# Patient Record
Sex: FEMALE | Race: WHITE | Employment: OTHER | URBAN - METROPOLITAN AREA
[De-identification: names, ages, dates, MRNs, and addresses within clinical notes are randomized per-mention and may not be internally consistent; named-entity substitution may affect disease eponyms.]

---

## 2018-02-22 ENCOUNTER — OFFICE VISIT (OUTPATIENT)
Dept: INTERNAL MEDICINE CLINIC | Facility: CLINIC | Age: 83
End: 2018-02-22

## 2018-02-22 VITALS
HEART RATE: 74 BPM | TEMPERATURE: 98.2 F | BODY MASS INDEX: 28.12 KG/M2 | HEIGHT: 62 IN | SYSTOLIC BLOOD PRESSURE: 107 MMHG | OXYGEN SATURATION: 93 % | RESPIRATION RATE: 16 BRPM | WEIGHT: 152.8 LBS | DIASTOLIC BLOOD PRESSURE: 43 MMHG

## 2018-02-22 DIAGNOSIS — C25.9 MALIGNANT NEOPLASM OF PANCREAS, UNSPECIFIED LOCATION OF MALIGNANCY (HCC): Primary | ICD-10-CM

## 2018-02-22 RX ORDER — MORPHINE SULFATE 15 MG/1
TABLET, FILM COATED, EXTENDED RELEASE ORAL EVERY 8 HOURS
COMMUNITY
End: 2018-02-28 | Stop reason: SDUPTHER

## 2018-02-22 RX ORDER — AMOXICILLIN 250 MG
1 CAPSULE ORAL DAILY
COMMUNITY
End: 2018-04-11

## 2018-02-22 NOTE — PROGRESS NOTES
HISTORY OF PRESENT ILLNESS  Bhavesh Cabrales is a 80 y.o. female. Chief Complaint   Patient presents with   174 [facility] Patient     Establish Care. Medication  evaluation. Room 8       HPI   Accompanied by second cousin  Ijeoma Fee phone: [phone]) who gives history secondary to patients' poor hearing. Pt previously lived in [place]. Was dx with stage 4 pancreatic cancer in hospital in [place]. Salena Amaro then moved pt in with her in [place] s/p diagnosis. Does not have local oncologist/palliative medicine. Had Hospice through [facility] and Salena Amaro notes that she has a robust local support group of friends who are helping as well. When she needs extra help, Salena Ramirezon has contracted caregivers during the day. Salena Ramirezon notes that Hospice is discharging pt from their care because her condition is not declining. Pt has only enough pain medication to take through Tuesday of next week, and Salena Amaro would like us to help her re-establish hospice care. Salena Amaro notes that after several unsuccessful medication trials, Chani is doing well on MS Contin - she is now able to eat solid food, walking with cane at home and wheelchair while out, giving herself sponge baths, brushing own teeth. Salena Amaro has been helping to bathe Chani as well. Salena Amaro notes that Chani is doing well on a routine schedule at home. Salena Amaro notes that she makes sure Chani leaves the house with her at least twice/week for groceries/art museum trips. They recently acquired a Papillon dog named Avis - pt smiles at this and notes that she is is enjoying having this dog, and the dog sits nicely with her for comfort. Review of Systems   Constitutional: Negative for fever. Respiratory: Negative for shortness of breath. Gastrointestinal: Negative for abdominal pain. Musculoskeletal: Negative for falls. Psychiatric/Behavioral: Negative for depression. The patient is not nervous/anxious.         Physical Exam   Constitutional: She is oriented to person, place, and time. She appears well-developed and well-nourished. No distress. Pt is well groomed and appears alert, happy, and appropriately engaged at times in the conversation, but her ability to participate in the conversation is limited by poor hearing. HENT:   Head: Normocephalic and atraumatic. Neck: Neck supple. No JVD present. Cardiovascular: Normal rate, regular rhythm and normal heart sounds. Pulmonary/Chest: Effort normal and breath sounds normal. No respiratory distress. Musculoskeletal: She exhibits no edema. Neurological: She is alert and oriented to person, place, and time. Skin: Skin is warm and dry. Psychiatric: She has a normal mood and affect. Her behavior is normal. Judgment and thought content normal.   Nursing note and vitals reviewed. ASSESSMENT and PLAN    ICD-10-CM ICD-9-CM    1. Malignant neoplasm of pancreas, unspecified location of malignancy (HCC) C25.9 157.9 Continue morphine CR (MS CONTIN) 15 mg CR tablet. No new Rx written. Emphasized that pain medications must be managed through palliative medicine.        Continue senna-docusate (SENNA PLUS) 8.6-50 mg per tablet      Continue DULCOLAX, BISACODYL, PO      REFERRAL TO PALLIATIVE MEDICINE      REFERRAL TO HOME HEALTH      REFERRAL TO HOSPICE

## 2018-02-22 NOTE — ACP (ADVANCE CARE PLANNING)
ACP is at home and Melisa Ontiveros (caregiver and second cousin) notes that she will bring it to pt's next appointment.

## 2018-02-22 NOTE — PROGRESS NOTES
Chief Complaint   Patient presents with   174 [facility] Patient     Establish Care. Medication  evaluation. Room 8      Dx: with Pancreatic cancer on 7/11/2017, maybe previously per benji    1. Have you been to the ER, urgent care clinic since your last visit? Hospitalized since your last visit? No    2. Have you seen or consulted any other health care providers outside of the [address] [place] [address] since your last visit? Include any pap smears or colon screening.  No

## 2018-02-22 NOTE — MR AVS SNAPSHOT
[address] [facility] 
[phone] Patient: James Jerry MRN: [id number] :[id number] Visit Information Date & Time Provider Department Dept. Phone Encounter #  
 2018 12:45 PM Олег Licona PA-C [facility] Internal Medicine [phone] [id number] Upcoming Health Maintenance Date Due DTaP/Tdap/Td series (1 - Tdap) 1944 ZOSTER VACCINE AGE 60> 1983 GLAUCOMA SCREENING Q2Y 1988 OSTEOPOROSIS SCREENING (DEXA) 1988 Pneumococcal 65+ Low/Medium Risk (1 of 2 - PCV13) 1988 MEDICARE YEARLY EXAM 1988 Influenza Age 5 to Adult 2017 Allergies as of 2018  Review Complete On: 2018 By: Sona Mckeon LPN No Known Allergies Current Immunizations  Never Reviewed No immunizations on file. Not reviewed this visit You Were Diagnosed With   
  
 Codes Comments Malignant neoplasm of pancreas, unspecified location of malignancy ([facility]    -  Primary ICD-10-CM: C25.9 ICD-9-CM: 157.9 Vitals BP Pulse Temp Resp Height(growth percentile) Weight(growth percentile) 107/43 (BP 1 Location: Left arm, BP Patient Position: Sitting) 74 98.2 °F (36.8 °C) (Oral) 16 5' 2\" (1.575 m) 152 lb 12.8 oz (69.3 kg) SpO2 BMI OB Status Smoking Status 93% 27.95 kg/m2 Postmenopausal Never Smoker Vitals History BMI and BSA Data Body Mass Index Body Surface Area  
 27.95 kg/m 2 1.74 m 2 Your Updated Medication List  
  
   
This list is accurate as of 18  1:51 PM.  Always use your most recent med list.  
  
  
  
  
 DULCOLAX (BISACODYL) PO Take  by mouth. MS CONTIN 15 mg CR tablet Generic drug:  morphine CR Take  by mouth every twelve (12) hours. SENNA PLUS 8.6-50 mg per tablet Generic drug:  senna-docusate Take 1 Tab by mouth daily. We Performed the Following [address] REFERRAL TO HOSPICE [REF35 Custom] Comments:  
 LESLY MILLARD [facility] [phone] Fax: [phone] 
 
or At [facility] [address] [facility] [address] [place], [address] 
 [phone] 
 [phone] REFERRAL TO PALLIATIVE MEDICINE [NZJ246 Custom] Referral Information Referral ID Referred By Referred To  
  
 [id number] [place], [facility] Cristiane Messina MD   
   [address] [address] PALLIATIVE MEDICINE [place], [facility] Phone: [phone] Fax: [phone] Visits Status Start Date End Date 1 New Request 2/22/18 2/22/19 If your referral has a status of pending review or denied, additional information will be sent to support the outcome of this decision. Referral ID Referred By Referred To  
 [id number] [place] [address]  
   Selin Cruz, [address] Phone: [phone] Fax: [phone] Visits Status Start Date End Date 1 New Request 2/22/18 2/22/19 If your referral has a status of pending review or denied, additional information will be sent to support the outcome of this decision. Referral ID Referred By Referred To  
 [id number] Zach Staley E Not Available Visits Status Start Date End Date 1 New Request 2/22/18 2/22/19 If your referral has a status of pending review or denied, additional information will be sent to support the outcome of this decision. Introducing [facility] & HEALTH SERVICES! Alethea Dubois introduces [employer] patient portal. Now you can access parts of your medical record, email your doctor's office, and request medication refills online. 1. In your internet browser, go to [email or web address] - [employer] 2. Click on the First Time User? Click Here link in the Sign In box. You will see the New Member Sign Up page. 3. Enter your [employer] Access Code exactly as it appears below. You will not need to use this code after youve completed the sign-up process. If you do not sign up before the expiration date, you must request a new code. · [employer] Access Code: [email or web address] Expires: 5/23/2018 12:58 PM 
 
4. Enter the last four digits of your Social Security Number (xxxx) and Date of Birth (mm/dd/yyyy) as indicated and click Submit. You will be taken to the next sign-up page. 5. Create a TekLinks ID. This will be your TekLinks login ID and cannot be changed, so think of one that is secure and easy to remember. 6. Create a [employer] password. You can change your password at any time. 7. Enter your Password Reset Question and Answer. This can be used at a later time if you forget your password. 8. Enter your e-mail address. You will receive e-mail notification when new information is available in [address] 9. Click Sign Up. You can now view and download portions of your medical record. 10. Click the Download Summary menu link to download a portable copy of your medical information. If you have questions, please visit the Frequently Asked Questions section of the [employer] website. Remember, [employer] is NOT to be used for urgent needs. For medical emergencies, dial 911. Now available from your iPhone and Android! Please provide this summary of care documentation to your next provider. If you have any questions after today's visit, please call [phone].

## 2018-02-23 ENCOUNTER — HOSPICE ADMISSION (OUTPATIENT)
Dept: HOSPICE | Facility: HOSPICE | Age: 83
End: 2018-02-23

## 2018-02-26 ENCOUNTER — NURSE NAVIGATOR (OUTPATIENT)
Dept: PALLATIVE CARE | Age: 83
End: 2018-02-26

## 2018-02-26 ENCOUNTER — TELEPHONE (OUTPATIENT)
Dept: PALLATIVE CARE | Age: 83
End: 2018-02-26

## 2018-02-26 NOTE — TELEPHONE ENCOUNTER
JUMA Chaudhary is calling regarding referral.  States she needs a MD to prescribe patients medications. MS. Nick Hasamara referred patient to Palliative.

## 2018-02-26 NOTE — PROGRESS NOTES
[employer] Palliative Medicine Office  Nursing Note  [phone]  Fax [phone]      Name:  Janessa Varela  YOB: 1923    Received outpatient Palliative Medicine referral from Joy Virgen, [facility] [address]) to see pt for symptom management. Pt has stage IV pancreatic cancer. Pt was seen by Joy Virgen to establish care on 2/22/18. Per Joy Virgen' note, pt was diagnosed with cancer in [place]. Pt moved to [facility] [place] to live with her second cousin Angela Bacon ([phone]). Pt does not have an oncologist in [place]. Pt has been in [facility] and they are discharging pt because her condition has not declined. Gonzalez Cihlds has a local support group of friends that are helping. Gonzalez Childs also has hired caregivers for Ms. Chin during the day. Gonzalez Childs notes that after several unsuccessful medication trials, Crhistina Mcpherson is doing well on MS Contin - she is now able to eat solid food, walking with cane at home and wheelchair while out, giving herself sponge baths, brushing own teeth. Gonzalez Childs notes that she makes sure Christina Mcpherson leaves the house with her at least twice/week for groceries/art museum trips.       Angela Bacon called our office today to speak with nurse about the referral.  Nurse explained outpatient Palliative Medicine services. Gonzalez Childs is interested in Palliative following Ms. Daina Sidhu until her condition declines and she can re-enroll in hospice. Appt scheduled for 8:30 am on 2/28/18 with Dr. Ronda Florentino at [facility] location. Gonzalez Childs says she has requested records from [facility] and they should arrive to her today.   She will bring the records with her on 2/28/18.  SANDIE Gale, RN-BC  Clinical Referral Navigator  [phone]

## 2018-02-28 ENCOUNTER — DOCUMENTATION ONLY (OUTPATIENT)
Dept: PALLATIVE CARE | Age: 83
End: 2018-02-28

## 2018-02-28 ENCOUNTER — OFFICE VISIT (OUTPATIENT)
Dept: PALLATIVE CARE | Age: 83
End: 2018-02-28

## 2018-02-28 ENCOUNTER — TELEPHONE (OUTPATIENT)
Dept: PALLATIVE CARE | Age: 83
End: 2018-02-28

## 2018-02-28 VITALS
RESPIRATION RATE: 16 BRPM | DIASTOLIC BLOOD PRESSURE: 55 MMHG | SYSTOLIC BLOOD PRESSURE: 148 MMHG | OXYGEN SATURATION: 92 % | WEIGHT: 154.2 LBS | BODY MASS INDEX: 28.37 KG/M2 | HEART RATE: 72 BPM | HEIGHT: 62 IN | TEMPERATURE: 97.3 F

## 2018-02-28 DIAGNOSIS — R10.84 ABDOMINAL PAIN, GENERALIZED: Primary | ICD-10-CM

## 2018-02-28 DIAGNOSIS — R53.83 FATIGUE, UNSPECIFIED TYPE: ICD-10-CM

## 2018-02-28 DIAGNOSIS — K86.89 PANCREATIC MASS: ICD-10-CM

## 2018-02-28 DIAGNOSIS — K59.03 DRUG-INDUCED CONSTIPATION: ICD-10-CM

## 2018-02-28 RX ORDER — ASPIRIN 81 MG/1
TABLET ORAL
COMMUNITY
End: 2020-01-01

## 2018-02-28 RX ORDER — AMOXICILLIN 250 MG
2 CAPSULE ORAL 2 TIMES DAILY
COMMUNITY
End: 2020-01-01

## 2018-02-28 RX ORDER — MORPHINE SULFATE 15 MG/1
15 TABLET, FILM COATED, EXTENDED RELEASE ORAL EVERY 8 HOURS
Qty: 90 TAB | Refills: 0 | Status: SHIPPED | OUTPATIENT
Start: 2018-02-28 | End: 2018-04-06 | Stop reason: SDUPTHER

## 2018-02-28 NOTE — MR AVS SNAPSHOT
2700 [facility] 1200 [facility] 1400 [address] [place] [address] 
[phone] Patient: Donald Ricketts MRN: [id number] :[id number] Visit Information Date & Time Provider Department Dept. Phone Encounter #  
 2018  8:30 AM Neville Rodriguez MD [address] [id number] Upcoming Health Maintenance Date Due DTaP/Tdap/Td series (1 - Tdap) 1944 ZOSTER VACCINE AGE 60> 1983 GLAUCOMA SCREENING Q2Y 1988 OSTEOPOROSIS SCREENING (DEXA) 1988 Pneumococcal 65+ High/Highest Risk (1 of 2 - PCV13) 1988 MEDICARE YEARLY EXAM 1988 Influenza Age 5 to Adult 2017 Allergies as of 2018  Review Complete On: 2018 By: Neville Rodriguez MD  
 No Known Allergies Current Immunizations  Never Reviewed No immunizations on file. Not reviewed this visit You Were Diagnosed With   
  
 Codes Comments Abdominal pain, generalized    -  Primary ICD-10-CM: R10.84 ICD-9-CM: 789.07 Malignant neoplasm of pancreas, unspecified location of malignancy ([facility]     ICD-10-CM: C25.9 ICD-9-CM: 157.9 Vitals BP Pulse Temp Resp Height(growth percentile) Weight(growth percentile) 148/55 (BP 1 Location: Right arm, BP Patient Position: Sitting) 72 97.3 °F (36.3 °C) (Oral) 16 5' 2\" (1.575 m) 154 lb 3.2 oz (69.9 kg) SpO2 BMI OB Status Smoking Status 92% 28.2 kg/m2 Postmenopausal Never Smoker BMI and BSA Data Body Mass Index Body Surface Area  
 28.2 kg/m 2 1.75 m 2 Your Updated Medication List  
  
   
This list is accurate as of 18  9:54 AM.  Always use your most recent med list.  
  
  
  
  
 aspirin delayed-release 81 mg tablet Take  by mouth daily as needed for Pain. DULCOLAX (BISACODYL) PO Take 0.5 Caps by mouth daily as needed. morphine CR 15 mg CR tablet Commonly known as:  MS CONTIN  
 Take 1 Tab by mouth every eight (8) hours. Max Daily Amount: 45 mg.  
  
 * SENNA PLUS 8.6-50 mg per tablet Generic drug:  senna-docusate Take 1 Tab by mouth daily. * SENNA PLUS 8.6-50 mg per tablet Generic drug:  senna-docusate Take 2 Tabs by mouth two (2) times a day. * Notice: This list has 2 medication(s) that are the same as other medications prescribed for you. Read the directions carefully, and ask your doctor or other care provider to review them with you. Prescriptions Printed Refills  
 morphine CR (MS CONTIN) 15 mg CR tablet 0 Sig: Take 1 Tab by mouth every eight (8) hours. Max Daily Amount: 45 mg.  
 Class: Print Route: Oral  
  
Patient Instructions Dear Galina Roberts , It was a pleasure seeing you in the Palliative Medicine Office today. This is what we talked about:  
 
1. Your pain 
-You had severe abdominal pain last summer when you were in the hospital 
-You haven't had any pain since you started taking the pain medicine on a regular basis 
-Continue morphine long-acting 15-mg every 8 hours 
-You are not using any short-acting opioid pain medicines at this time 
-Gato Felix very occasionally gives you a baby aspirin when you have other types of pain like a headache 2. Your bowels 
-You've had 2 episodes of severe constipation which can occur with morphine 
-You've been having regular bowel movements since you started taking 2 senna plus tabs twice a day 
-You occasionally take dulcolax if you go more than 2 days without a bowel movement 3. Your fatigue 
-You have mild fatigue which we did not talk about in depth today 4.  Your function 
-You walk with a cane or a walker to help prevent falls 
-You have a wheelchair you can use when you go outside of your home 
-You give yourself sponge baths every day with your aide or Gato Felix close by in case you need any help 
-You are able to get to the bathroom when you need to go and you don't have any accidents 
-Merary Alberto helps to organize your medications for you 
-You have someone with you in your home all the time just in case you need help 5. What's important to you 
-You really enjoy reading, especially mysteries! 
-You and Merary Alberto go out once a week for a social activity such as going to [email or web address] on Thursday nights for jaZyncd night 
-You go out to get your hair done 
-You enjoy the company of your Mariela Fisherman, you beloved canine  
-Regino Mack been to [place] with Merary Alberto and may go on other trips if you are up to it 
-I'm going to get additional records from the hospital in [place] which we can talk about at your next visit This is what you have shared with us about Advance Care Planning Advance Care Planning 2/28/2018 Patient's Healthcare Decision Maker is: Verbal statement (Legal Next of Kin remains as decision maker) Primary Decision Maker Name Ban Gonsalez Primary Decision Maker Phone Number [phone] Primary Decision Maker Relationship to Patient Friend The Palliative Medicine Team is here to support you and your family. We will see you again in 4 weeks. Our Nurse Navigator Germán Cheng will check in with you by phone before that time. If there are any concerns before that time, such as medication questions, worsening symptoms or a need to see a physician for an urgent or emergent situation; please call [phone] (COPE). A physician is also on call after our normal business hours of 8am to 5pm.  
 
In order to serve you better, please allow up to 48 hours for prescription refills to be processed. Certain medications may require more paperwork or a written prescription that you may need to  from the office. We appreciate you letting us know of any refill requests as soon as possible. We also would like you to sign up for [employer] as well. Sincerely, Aaliyah Potter MD and the Palliative Medicine Team 
 
 
  
 Introducing [facility] & HEALTH SERVICES! [facility] introduces [employer] patient portal. Now you can access parts of your medical record, email your doctor's office, and request medication refills online. 1. In your internet browser, go to [email or web address] 2. Click on the First Time User? Click Here link in the Sign In box. You will see the New Member Sign Up page. 3. Enter your [employer] Access Code exactly as it appears below. You will not need to use this code after youve completed the sign-up process. If you do not sign up before the expiration date, you must request a new code. · [employer] Access Code: [email or web address] Expires: 5/23/2018 12:58 PM 
 
4. Enter the last four digits of your Social Security Number (xxxx) and Date of Birth (mm/dd/yyyy) as indicated and click Submit. You will be taken to the next sign-up page. 5. Create a [employer] ID. This will be your [employer] login ID and cannot be changed, so think of one that is secure and easy to remember. 6. Create a [employer] password. You can change your password at any time. 7. Enter your Password Reset Question and Answer. This can be used at a later time if you forget your password. 8. Enter your e-mail address. You will receive e-mail notification when new information is available in [address] 9. Click Sign Up. You can now view and download portions of your medical record. 10. Click the Download Summary menu link to download a portable copy of your medical information. If you have questions, please visit the Frequently Asked Questions section of the [employer] website. Remember, [employer] is NOT to be used for urgent needs. For medical emergencies, dial 911. Now available from your iPhone and Android! Please provide this summary of care documentation to your next provider. Your primary care clinician is listed as Kody Paige. If you have any questions after today's visit, please call [phone].

## 2018-02-28 NOTE — TELEPHONE ENCOUNTER
Calling [address] [place] [address] to obtain discharge papers for insurance reasons. Spoke to , transferred me to local office , spoke to [place] who then transferred me to [facility]. Mac states that patient was discharged from Hospice yesterday and she will fax me the paperwork to [phone].

## 2018-02-28 NOTE — PATIENT INSTRUCTIONS
Dear Suzi Whitley ,    It was a pleasure seeing you in the Palliative Medicine Office today. This is what we talked about:     1. Your pain  -You had severe abdominal pain last summer when you were in the hospital  -You haven't had any pain since you started taking the pain medicine on a regular basis  -Continue morphine long-acting 15-mg every 8 hours  -You are not using any short-acting opioid pain medicines at this time  -Merary Alberto very occasionally gives you a baby aspirin when you have other types of pain like a headache    2. Your bowels  -You've had 2 episodes of severe constipation which can occur with morphine  -You've been having regular bowel movements since you started taking 2 senna plus tabs twice a day  -You occasionally take dulcolax if you go more than 2 days without a bowel movement    3. Your fatigue  -You have mild fatigue which we did not talk about in depth today    4. Your function  -You walk with a cane or a walker to help prevent falls  -You have a wheelchair you can use when you go outside of your home  -You give yourself sponge baths every day with your aide or Merary Alberto close by in case you need any help  -You are able to get to the bathroom when you need to go and you don't have any accidents  -Merary Alberto helps to organize your medications for you  -You have someone with you in your home all the time just in case you need help    5.  What's important to you  -You really enjoy reading, especially mysteries!  -You and Merary Alberto go out once a week for a social activity such as going to [employer] on Thursday nights for jazz night  -You go out to get your hair done  -You enjoy the company of your Kiraxman, you beloved canine   -Regino Mack been to [facility] with Merary Alberto and may go on other trips if you are up to it  -I'm going to get additional records from the hospital in [place] which we can talk about at your next visit    This is what you have shared with us about Advance Care Planning  Advance Care Planning 2/28/2018   Patient's Healthcare Decision Maker is: Verbal statement (Legal Next of Kin remains as decision maker)   Primary Decision Maker Name Karen Casanova   Primary Decision Maker Phone Number [phone]   Primary Decision Maker Relationship to Patient Friend         The Palliative Medicine Team is here to support you and your family. We will see you again in 4 weeks. Our Nurse Navigator Josefina Enamorado will check in with you by phone before that time. If there are any concerns before that time, such as medication questions, worsening symptoms or a need to see a physician for an urgent or emergent situation; please call [phone] (COPE). A physician is also on call after our normal business hours of 8am to 5pm.     In order to serve you better, please allow up to 48 hours for prescription refills to be processed. Certain medications may require more paperwork or a written prescription that you may need to  from the office. We appreciate you letting us know of any refill requests as soon as possible. We also would like you to sign up for [employer] as well.     Sincerely,      Pamela Mascorro MD and the Palliative Medicine Team

## 2018-02-28 NOTE — PROGRESS NOTES
Palliative Medicine Outpatient Services  [place]: [phone] ([phone])    Patient Name: Yazmin Carl  YOB: 1923    Date of Current Visit: 02/28/18  Location of Current Visit:    [x] [facility] Office  [] [facility] Office  [] [id number] [facility] Office  [] Home  [] Other:      Date of Initial Visit: 2/28/18   Requesting Physician: Jessica Arora PA-C  Primary Care Physician: Hortensia Davis PA-C      SUMMARY:   Yazmin Carl is a 80y.o. year old with a past history of pancreatic mass by CT suspicious for malignancy, who was referred to Palliative Medicine by Ms. Trisha Gaxiola for symptom management and supportive care. She was hospitalized in [place] in 7/2017 with decreased appetite, functional decline and abdominal pain. CT scan then revealed presence of pancreatic mass (CT scan/results not available at this time for review). She was given a presumptive diagnosis of pancreatic cancer. She declined further work-up. She moved from her home in [place] where she had been living independently to [place] to live with her 2nd cousin/POA, Alexandria Billingsley, and was followed by [facility] until recently when she was discharged from hospice for failure to decline. The patients social history includes: she is . She has no living siblings. She lives in [place] with her second cousin, Alexandria Billingsley. Palliative Medicine is addressing the following current patient/family concerns: abdominal pain in setting of CT findings suspicious for pancreatic malignancy, mild fatigue, constipation. PALLIATIVE DIAGNOSES:       ICD-10-CM ICD-9-CM    1. Abdominal pain, generalized R10.84 789.07 morphine CR (MS CONTIN) 15 mg CR tablet   2. Drug-induced constipation K59.03 564.09      E980.5    3. Fatigue, unspecified type R53.83 780.79    4. Pancreatic mass K86.9 577.9           PLAN:   Patient Instructions     Dear Yazmin Carl ,    It was a pleasure seeing you in the Palliative Medicine Office today. This is what we talked about:     1. Your pain  -You had severe abdominal pain last summer when you were in the hospital  -You haven't had any pain since you started taking the pain medicine on a regular basis  -Continue morphine long-acting 15-mg every 8 hours  -You are not using any short-acting opioid pain medicines at this time  -Micheline Lot very occasionally gives you a baby aspirin when you have other types of pain like a headache    2. Your bowels  -You've had 2 episodes of severe constipation which can occur with morphine  -You've been having regular bowel movements since you started taking 2 senna plus tabs twice a day  -You occasionally take dulcolax if you go more than 2 days without a bowel movement    3. Your fatigue  -You have mild fatigue which we did not talk about in depth today    4. Your function  -You walk with a cane or a walker to help prevent falls  -You have a wheelchair you can use when you go outside of your home  -You give yourself sponge baths every day with your aide or Micheline Lot close by in case you need any help  -You are able to get to the bathroom when you need to go and you don't have any accidents  -Micheline Lot helps to organize your medications for you  -You have someone with you in your home all the time just in case you need help    5.  What's important to you  -You really enjoy reading, especially mysteries!  -You and Micheline Lot go out once a week for a social activity such as going to [employer] on Thursday nights for jazz night  -You go out to get your hair done  -You enjoy the company of your Cathleen Ramirez, you beloved canine   -Snow Young been to the [facility] with Micheline Lot and may go on other trips if you are up to it  -I'm going to get additional records from the hospital in [place] which we can talk about at your next visit    This is what you have shared with us about Mary Sheth. Planning 2/28/2018   Patient's [address] is: Verbal statement (Legal Next of Kin remains as decision maker)   Primary Decision Maker Name Cary Garcia   Primary Decision Maker Phone Number [phone]   Primary Decision Maker Relationship to Patient Friend         The Palliative Medicine Team is here to support you and your family. We will see you again in 4 weeks. Our Nurse Navigator Tristen Reyes will check in with you by phone before that time. If there are any concerns before that time, such as medication questions, worsening symptoms or a need to see a physician for an urgent or emergent situation; please call [phone] (COPE). A physician is also on call after our normal business hours of 8am to 5pm.     In order to serve you better, please allow up to 48 hours for prescription refills to be processed. Certain medications may require more paperwork or a written prescription that you may need to  from the office. We appreciate you letting us know of any refill requests as soon as possible. We also would like you to sign up for [employer] as well.     Sincerely,      Tiffanie Hernández MD and the Palliative Medicine Team      Counseling and Coordination: note routed to Hedy LIAO; will obtain records/CT scan report from        [address] / TREATMENT PREFERENCES:   [====Goals of Care====]  GOALS OF CARE:  Patient / health care proxy stated goals: maintenance of quality of life      TREATMENT PREFERENCES:   Code Status:  [] Attempt Resuscitation       [x] Do Not Attempt Resuscitation    Advance Care Planning:  Advance Care Planning 2/28/2018   Patient's Healthcare Decision Maker is: Verbal statement (Legal Next of Kin remains as decision maker)   Primary Decision Maker Name Cary Garcia   Primary Decision Maker Phone Number [phone]   Primary Decision Maker Relationship to Patient Friend       Other:  (If patient appropriate for POST, consider using PALLPOST smart phrase here)    The palliative care team has discussed with patient / health care proxy about goals of care / treatment preferences for patient.  [====Goals of Care====]         PRESCRIPTIONS GIVEN:     Medications Ordered Today   Medications    morphine CR (MS CONTIN) 15 mg CR tablet     Sig: Take 1 Tab by mouth every eight (8) hours. Max Daily Amount: 45 mg. Dispense:  90 Tab     Refill:  0           FOLLOW UP:     Future Appointments  Date Time Provider Getachew Pearson   3/21/2018 8:30 AM Chelly Delgado MD [address] [place] [address] Rd IN CARE:   Patient Care Team:  Luisito Negron PA-C as PCP - General (Physician Assistant)  Chelly Delgado MD as Physician (Palliative Medicine)       HISTORY:   Nursing documentation from date of visit reviewed. Reviewed patient-completed ESAS and advance care planning form. Reviewed patient record in prescription monitoring program.    CHIEF COMPLAINT: abdominal pain, mild fatigue    HPI/SUBJECTIVE:    The patient is: [x] Verbal / [] Nonverbal (most history obtained from primary caregiver, Rios Ma, due to memory difficulties)    She's feeling fine today. She has no pain. She was really sick last summer, she doesn't remember why, so she moved to [place] to live with Rios Ma. She's feeling fine today. She has no pain (but she wonders if she really does have pain because people keep asking her about it). Nothing is bothering her today. She likes to read books, mysteries are her favorites. From Rios Ma - she was living on her own before she went to the hospital last summer. She had a neighbor who checked in on her from time to time. She wasn't eating much, just drinking Ensure, for weeks prior to being admitted. When she left the hospital, Rios Ma was told that she had stage IV pancreatic cancer and would likely not live more than 3 to 6 months. She wasn't eating. She was weak. She had a lot of abdominal pain. She was admitted to hospice and her pain was finally controlled with long-acting morphine.  She had some trouble with bad constipation twice but not since she started taking 2 senna twice a day. She gradually started eating solid food again. She became physically stronger. She improved to the point where hospice discharged her from their service. She's doing much better than she was when she left the hospital. She gets up out of bed every day. She feeds herself. She gives herself a sponge bath every day with her aide or Sanjuana Skinner standing by. She walks with a cane or a walker and uses a wheelchair when she goes out. She hasn't had any falls. She is able to make it into the bathroom when she needs to go and doesn't have any accidents. Sanjuana Skinner takes her out, usually twice a week, with one \"fun\" outing a week: to [employer] or [place] [facility] [place] Thursday jazz nights. She watches the news and reads the newspaper. She wanted to get a dog so Sanjuana Skinner bought her a Papillon. RosaFaizan Lubin is her constant  who sleeps with her at night. 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 She has aides who are in the home when she's not there. She also has an extensive support system who have supported her in the transition of having Corey Mckenzie move in with her.         Clinical Pain Assessment (nonverbal scale for nonverbal patients):   [++++ Clinical Pain Assessment++++]  [++++Pain Severity++++]: Pain: 0  [++++Pain Character++++]:  [++++Pain Duration++++]:  [++++Pain Effect++++]:  [++++Pain Factors++++]:  [++++Pain Frequency++++]:  [++++Pain Location++++]:  [++++ Clinical Pain Assessment++++]       FUNCTIONAL ASSESSMENT:     Palliative Performance Scale (PPS):  PPS: 70       PSYCHOSOCIAL/SPIRITUAL SCREENING:     Any spiritual / Jehovah's witness concerns:  [] Yes /  [x] No    Caregiver Burnout:  [] Yes /  [x] No /  [] No Caregiver Present      Anticipatory grief assessment:   [] Normal  / [] Maladaptive       ESAS Anxiety: Anxiety: 0    ESAS Depression: Depression: 0       REVIEW OF SYSTEMS:     The following systems were [x] reviewed / [] unable to be reviewed  Systems: constitutional, ears/nose/mouth/throat, respiratory, gastrointestinal, genitourinary, musculoskeletal, integumentary, neurologic, psychiatric, endocrine. Positive findings noted below. Modified ESAS Completed by: provider   Fatigue: 1 Drowsiness: 0   Depression: 0 Pain: 0   Anxiety: 0 Nausea: 0   Anorexia: 0 Dyspnea: 0   Best Well-Bein Constipation: No   Other Problem (Comment): 0          PHYSICAL EXAM:     Wt Readings from Last 3 Encounters:   18 154 lb 3.2 oz (69.9 kg)   18 152 lb 12.8 oz (69.3 kg)     Blood pressure 148/55, pulse 72, temperature 97.3 °F (36.3 °C), temperature source Oral, resp. rate 16, height 5' 2\" (1.575 m), weight 154 lb 3.2 oz (69.9 kg), SpO2 92 %. Last bowel movement: See Nursing Note    Constitutional: sitting in wheelchair, appears relaxed  Eyes: pupils equal, anicteric  ENMT: no nasal discharge, moist mucous membranes; HARD OF HEARING  Cardiovascular: regular rhythm, distal pulses intact  Respiratory: breathing not labored, symmetric  Gastrointestinal: soft non-tender, +bowel sounds  Musculoskeletal: no deformity, no tenderness to palpation; trace bilateral symmetric lower extremity edema  Skin: warm, dry  Neurologic: alert, knows name and can give most history with confusion about details, following commands, moving all extremities  Psychiatric: full affect, no hallucinations  Other:       HISTORY:     Past Medical History:   Diagnosis Date    Cancer ([facility] [address]     Pancreatic    Dry eyes     Hypertension, essential     Migraine       History reviewed. No pertinent surgical history. History reviewed. No pertinent family history. History reviewed, no pertinent family history.   Social History   Substance Use Topics    Smoking status: Never Smoker    Smokeless tobacco: Never Used    Alcohol use No     No Known Allergies   Current Outpatient Prescriptions   Medication Sig    senna-docusate (SENNA PLUS) 8.6-50 mg per tablet Take 2 Tabs by mouth two (2) times a day.  morphine CR (MS CONTIN) 15 mg CR tablet Take 1 Tab by mouth every eight (8) hours. Max Daily Amount: 45 mg.    senna-docusate (SENNA PLUS) 8.6-50 mg per tablet Take 1 Tab by mouth daily.  aspirin delayed-release 81 mg tablet Take  by mouth daily as needed for Pain.  DULCOLAX, BISACODYL, PO Take 0.5 Caps by mouth daily as needed. No current facility-administered medications for this visit.            LAB DATA REVIEWED:     No results found for: WBC, HGB, PLT, HGBEXT, PLTEXT, HGBEXT, PLTEXT  No results found for: NA, K, CL, CO2, BUN, CREA, CA, MG, PHOS   No results found for: SGOT, GPT, AP, TBIL, TP, ALB, GLOB, GGT  No results found for: INR, PTMR, PTP, PT1, PT2, APTT   No results found for: IRON, FE, TIBC, IBCT, PSAT, FERR        CONTROLLED SUBSTANCES SAFETY ASSESSMENT (IF ON CONTROLLED SUBSTANCES):     Reviewed opioid safety handout:  [x] Yes   [] No  24 hour opioid dose >150mg morphine equivalent/day:  [] Yes   [x] No  Benzodiazepines:  [] Yes   [x] No  Sleep apnea:  [] Yes   [x] No  Urine Toxicology Testing within last 6 months:  [] Yes   [] No  History of or new aberrant medication taking behaviors:  [] Yes   [] No            Total time:   Counseling / coordination time:   > 50% counseling / coordination?:

## 2018-02-28 NOTE — PROGRESS NOTES
Faxed request for medical records to [facility] [phone]. DR Zachery Sanchez requested all Ct Scan reports and imaging confirmation received.

## 2018-02-28 NOTE — PROGRESS NOTES
Palliative Medicine Office Visit  Palliative Medicine Nurse Check In  [phone]    Patient Name: Sandra Trevizo  YOB: 1923      Date of Office Visit: 2/28/2018      Patient states: The Niece said she was referred by the PCP. From Check In Sheet (scanned in Media):  Has a medical provider talked with you about cardiopulmonary resuscitation (CPR)? [x] Yes   [] No   [] Unable to obtain    Nurse reminder to complete or update ACP FlowSheet:    Is ACP on the Problem List?    [] Yes    [x] No  IF ACP Document is ON FILE; Nurse to place ACP on Problem List     Is there an ACP Note in Chart Review/Note? [] Yes    [x] No   If NO: [address] 2/28/2018   Patient's Healthcare Decision Maker is: Verbal statement (Legal Next of Kin remains as decision maker)   Primary Decision Maker Name Von Angelucci   Primary Decision Maker Phone Number [phone]   Primary Decision Maker Relationship to Patient Friend       Is there anything that we should know about you as a person in order to provide you the best care possible? No    Have you been to the ER, urgent care clinic since your last visit? [] Yes   [x] No   [] Unable to obtain    Have you been hospitalized since your last visit? [] Yes   [x] No   [] Unable to obtain    Have you seen or consulted any other health care providers outside of the [address] [place] [address] since your last visit? [] Yes   [x] No   [] Unable to obtain    Functional status (describe):         Last BM: 2/26/18     accessed (date): 2/27/18    Bottle review (for opioid pain medication): Patient did not bring any medication with.   Medication 1:   Date filled:   Directions:   # filled:   # left:   # pills taking per day:  Last dose taken:    Medication 2:   Date filled:   Directions:   # filled:   # left:   # pills taking per day:  Last dose taken:    Medication 3:   Date filled:   Directions:   # filled:   # left:   # pills taking per day:  Last dose taken:    Medication 4:   Date filled:   Directions:   # filled:   # left:   # pills taking per day:  Last dose taken:

## 2018-03-01 ENCOUNTER — TELEPHONE (OUTPATIENT)
Dept: PALLATIVE CARE | Age: 83
End: 2018-03-01

## 2018-03-01 ENCOUNTER — NURSE NAVIGATOR (OUTPATIENT)
Dept: PALLATIVE CARE | Age: 83
End: 2018-03-01

## 2018-03-01 NOTE — TELEPHONE ENCOUNTER
[facility]. [employer]  [employer] [phone]

## 2018-03-01 NOTE — PROGRESS NOTES
[facility] Palliative Medicine Office  Nursing Note  [phone]  Fax [phone]     Name:  Valerie Mercado  YOB: 1923     Received incoming fax from [facility] stating pt's Morphine Sulfate 15mg ER tablet prescription that was written on 2/28/18 would require a prior authorization. Nurse called Rx plan at [phone] and spoke with [facility]. She said that with pt's dx, the Rx plan will put an override to allow the prescription to be filled but it may take up to 24 hours. Nurse called [facility] [phone] and spoke with pharmacist Denzel who checked and said the probable reason the prescription wasn't approved was because  shows pt had a 15 day prescription of Morphine Sulfate 15mg ER filled by a pharmacy in [place] on 2/21/18. So pt should have enough medication to last through 3/7/18. Nurse called and spoke with pt's mPOA/cousin Alyce Hernandez. She says that the 2/21/18 prescription was filled with the mail order pharmacy through [facility]. She counted the Morphine ER pills and pt has enough to last through 3/7/18. Nurse called [facility] back and spoke with alexandr Mahoney.   She will hold the Morphine Sulfate 15mg ER prescription and fill it on 3/7/18.       SAVAGE WoodN, RN  Clinical Referral Navigator

## 2018-03-19 ENCOUNTER — TELEPHONE (OUTPATIENT)
Dept: PALLATIVE CARE | Age: 83
End: 2018-03-19

## 2018-03-19 NOTE — TELEPHONE ENCOUNTER
Called patient to advise/confirm upcoming appt with Dr. Alicia Ramirez on  3/21/18    at 8:30am at [facility]. Also advised to please bring in your Drivers License and Insurance Card with you to appointment as well as any prescription pain medication in the original container with you to appt.

## 2018-03-20 NOTE — TELEPHONE ENCOUNTER
Called patient to advise/confirm upcoming appt with Dr. Ronald Narvaez on    3/21/18  at 8:30am.  Ruthy Harding confirmed. Also advised to please bring in your Drivers License and Insurance Card with you to appointment as well as any prescription pain medication in the original container with you to appt.

## 2018-03-23 NOTE — TELEPHONE ENCOUNTER
Called Gonzalez Childs to try to get appt rescheduled from miss appt on Wed due to weather. No answer so left voicemail asking for a call back to r/s appt.

## 2018-04-06 ENCOUNTER — NURSE NAVIGATOR (OUTPATIENT)
Dept: PALLATIVE CARE | Age: 83
End: 2018-04-06

## 2018-04-06 ENCOUNTER — TELEPHONE (OUTPATIENT)
Dept: PALLATIVE CARE | Age: 83
End: 2018-04-06

## 2018-04-06 DIAGNOSIS — R10.84 ABDOMINAL PAIN, GENERALIZED: ICD-10-CM

## 2018-04-06 RX ORDER — MORPHINE SULFATE 15 MG/1
15 TABLET, FILM COATED, EXTENDED RELEASE ORAL EVERY 8 HOURS
Qty: 90 TAB | Refills: 0 | Status: CANCELLED | OUTPATIENT
Start: 2018-04-06

## 2018-04-06 RX ORDER — MORPHINE SULFATE 15 MG/1
15 TABLET, FILM COATED, EXTENDED RELEASE ORAL EVERY 8 HOURS
Qty: 90 TAB | Refills: 0 | Status: SHIPPED | OUTPATIENT
Start: 2018-04-07 | End: 2018-04-11 | Stop reason: SDUPTHER

## 2018-04-06 NOTE — PROGRESS NOTES
Palliative Medicine  Nursing Note  [phone]  Fax [phone]        Clinic Office Visit  Patient Name: Fabiana Dobbs  YOB: 1923 4/6/2018      Advance Care Planning 2/28/2018   Patient's Healthcare Decision Maker is: Verbal statement (Legal Next of Kin remains as decision maker)   Primary Decision Maker Name Coreen Alcantar   Primary Decision Maker Phone Number [phone]   Primary Decision Maker Relationship to Patient Friend     Spoke with Ivis Klein to let her know that a prescription for Ms. Chin morphine 15 mg CR will be at the [facility] [place] office for pick, reminder her of her 4/11/2018 appointment.       Yasmin Hansen, BSN, RN, OCN, [facility]  Palliative Medicine

## 2018-04-06 NOTE — TELEPHONE ENCOUNTER
Returned call to Ms Neftaly Palacio left a V/M to call the office back. Rx request will be sent to DR SANDY & JUDI FERNANDEZ.

## 2018-04-06 NOTE — TELEPHONE ENCOUNTER
Ms. Sofía Vargas is calling to request a refill on patients MS Contin. States patient only has enough for 4 days and would like to get picked up today. Please call to advise.

## 2018-04-06 NOTE — TELEPHONE ENCOUNTER
Triage for Controlled Substance Refill Request    Pain Diagnosis: Malignant neoplasm of pancreas     Last Outpatient Visit:  2/28/18    Next Outpatient Visit:  3/19/18     Reason for refill needed outside of office visit? Patient only has 4 days of medication left. [] Pain escalation requiring increased medication  [] Appointment not scheduled prior to need for scheduled refill  [] Appointment scheduled but missed or moved prior to need for scheduled refill    Requested Prescriptions      No prescriptions requested or ordered in this encounter       [facility]    Medication :Morphine CR (MS CONTIN) 15 mg CR tablet     Dose and directions: Take 1 Tab by mouth every eight (8) hours.  Max Daily Amount: 45 mg.              Number dispensed:90 tabs  Date filled ( or Pharmacy):3/8/18  # left: 12 tabs     Medication:  Dose and directions:  Number dispensed:  Date filled ( or Pharmacy):  #left:     reviewed: Yes    Date of Urine Drug Screen:  N/A    Opioid Safety Handout given:  Yes    Appropriate for refill:  Yes    Action:   Refill request sent to Dr Madyson Lew

## 2018-04-09 ENCOUNTER — TELEPHONE (OUTPATIENT)
Dept: PALLATIVE CARE | Age: 83
End: 2018-04-09

## 2018-04-09 NOTE — TELEPHONE ENCOUNTER
Called patient to advise/confirm upcoming appt with Dr. Desiree Whiet on    4/11/18  at  10:30am at [facility]. Ximena Bae confirmed appt. Also advised to please bring in your Drivers License and Insurance Card with you to appointment as well as any prescription pain medication in the original container with you to appt.

## 2018-04-11 ENCOUNTER — OFFICE VISIT (OUTPATIENT)
Dept: PALLATIVE CARE | Age: 83
End: 2018-04-11

## 2018-04-11 VITALS
RESPIRATION RATE: 16 BRPM | HEART RATE: 73 BPM | BODY MASS INDEX: 29.08 KG/M2 | WEIGHT: 158 LBS | SYSTOLIC BLOOD PRESSURE: 144 MMHG | DIASTOLIC BLOOD PRESSURE: 51 MMHG | HEIGHT: 62 IN | OXYGEN SATURATION: 93 % | TEMPERATURE: 97.7 F

## 2018-04-11 DIAGNOSIS — R10.84 ABDOMINAL PAIN, GENERALIZED: Primary | ICD-10-CM

## 2018-04-11 DIAGNOSIS — K59.03 DRUG-INDUCED CONSTIPATION: ICD-10-CM

## 2018-04-11 DIAGNOSIS — R53.83 FATIGUE, UNSPECIFIED TYPE: ICD-10-CM

## 2018-04-11 DIAGNOSIS — K86.89 PANCREATIC MASS: ICD-10-CM

## 2018-04-11 RX ORDER — MORPHINE SULFATE 15 MG/1
15 TABLET, FILM COATED, EXTENDED RELEASE ORAL EVERY 8 HOURS
Qty: 90 TAB | Refills: 0 | Status: SHIPPED | OUTPATIENT
Start: 2018-05-05 | End: 2018-06-06 | Stop reason: SDUPTHER

## 2018-04-11 RX ORDER — MORPHINE SULFATE 15 MG/1
15 TABLET, FILM COATED, EXTENDED RELEASE ORAL EVERY 8 HOURS
Qty: 90 TAB | Refills: 0 | Status: SHIPPED | OUTPATIENT
Start: 2018-05-05 | End: 2018-04-11 | Stop reason: SDUPTHER

## 2018-04-11 NOTE — PROGRESS NOTES
Palliative Medicine Outpatient Services  [facility]: [phone] ([phone])    Patient Name: Betina Avery  YOB: 1923    Date of Current Visit: 04/11/18  Location of Current Visit:    [x] [facility] Office  [] [facility] Office  [] [address] [facility] Office  [] Home  [] Other:      Date of Initial Visit: 2/28/18   Requesting Physician: Emily Molina PA-C  Primary Care Physician: Daisy Martinez PA-C      SUMMARY:   Betina Avery is a 80y.o. year old with a past history of pancreatic mass by CT suspicious for malignancy, who was referred to Palliative Medicine by Ms. Flory Olivares for symptom management and supportive care. She was hospitalized in [place] in 7/2017 with decreased appetite, functional decline and abdominal pain. CT scan then revealed presence of pancreatic mass (CT scan/results not available at this time for review). She was given a presumptive diagnosis of pancreatic cancer. She declined further work-up. She moved from her home in [place] where she had been living independently to [facility] to live with her 2nd cousin/POA, Emilia Bernardo, and was followed by [facility] until recently when she was discharged from hospice for failure to decline. The patients social history includes: she is . She has no living siblings. She lives in [facility] with her second cousin, Emilia Bernardo. Palliative Medicine is addressing the following current patient/family concerns: abdominal pain in setting of CT findings suspicious for pancreatic malignancy, mild fatigue, constipation. PALLIATIVE DIAGNOSES:       ICD-10-CM ICD-9-CM    1. Abdominal pain, generalized R10.84 789.07 morphine CR (MS CONTIN) 15 mg CR tablet      DISCONTINUED: morphine CR (MS CONTIN) 15 mg CR tablet   2. Drug-induced constipation K59.03 564.09      E980.5    3. Fatigue, unspecified type R53.83 780.79    4.  Pancreatic mass K86.9 577.9           PLAN:   Patient Instructions     Dear Betina Avery ,    It was a pleasure seeing you in the Palliative Medicine Office today. This is what we talked about:     1. Your pain  -You had severe abdominal pain last summer when you were in the hospital  -You haven't had any pain since you started taking the pain medicine on a regular basis  -Continue morphine long-acting 15-mg every 8 hours  -You are not using any short-acting opioid pain medicines at this time  -Sharona Carpio very occasionally gives you a baby aspirin when you have other types of pain like a headache    2. Your bowels  -You've been having regular bowel movements taking senna 2  Tabs twice a day  -You occasionally take dulcolax if you go more than 2 days without a bowel movement    3. Your fatigue  -You energy is good! 4. Your function  -You walk with a cane or a walker to help prevent falls. You haven't had any falls since your last visit here  -You have a wheelchair you can use when you go outside of your home  -You feed yourself and help Sharona Carpio clean up after dinner by drying the dishes  -You give yourself sponge baths every day with your aide or Sharona Carpio close by in case you need any help  -You are able to get to the bathroom when you need to go and you don't have any accidents  -Sharona Carpio helps to organize your medications for you  -You have someone with you in your home all the time just in case you need help    5.  What's important to you  -You really enjoy reading, especially mysteries!  -You and Sharona Carpio go out once a week for a social activity such as going to [place] on Thursday nights for jazz night  -You go out to get your hair done and to get regular pedicures  -You enjoy the company of your Luigi Lundborg, you beloved canine   -You and Sharona Carpio are enjoying looking at there flowers in the neighborhood which are starting to bloom  -We talked today about getting back in touch with your primary care provider, Deidre LIAO, to see if she want to see you for a check-up    This is what you have shared with us about [place]. Planning 4/11/2018   Patient's Healthcare Decision Maker is: Named in scanned ACP document   Primary Decision Maker Name Yuliya Lim   Primary Decision Maker Phone Number [phone]   Primary Decision Maker Relationship to Patient Other relative   Confirm Advance Directive Yes, on file   Does the patient have other document types Do Not Resuscitate         The Palliative Medicine Team is here to support you and your family. We will see you again in 8 weeks. Our Nurse Navigator Makeda Hull will check in with you by phone before that time. If there are any concerns before that time, such as medication questions, worsening symptoms or a need to see a physician for an urgent or emergent situation; please call [phone] (COPE). A physician is also on call after our normal business hours of 8am to 5pm.     In order to serve you better, please allow up to 48 hours for prescription refills to be processed. Certain medications may require more paperwork or a written prescription that you may need to  from the office. We appreciate you letting us know of any refill requests as soon as possible. We also would like you to sign up for [employer] as well.     Sincerely,      Carin Perez MD and the Palliative Medicine Team      Counseling and Coordination: note routed to Daria LIAO; will obtain records/CT scan report from        [address] / TREATMENT PREFERENCES:   [====Goals of Care====]  GOALS OF CARE:  Patient / health care proxy stated goals: maintenance of quality of life      TREATMENT PREFERENCES:   Code Status:  [] Attempt Resuscitation       [x] Do Not Attempt Resuscitation    Advance Care Planning:  Advance Care Planning 4/11/2018   Patient's Healthcare Decision Maker is: Named in scanned ACP document   Primary Decision Maker Name Yuliya Lim   Primary Decision Maker Phone Number [phone]   Primary Decision Maker Relationship to Patient Other relative   Confirm Advance Directive Yes, on file   Does the patient have other document types Do Not Resuscitate       Other:  (If patient appropriate for POST, consider using PALLPOST smart phrase here)    The palliative care team has discussed with patient / health care proxy about goals of care / treatment preferences for patient.  [====Goals of Care====]         PRESCRIPTIONS GIVEN:     Medications Ordered Today   Medications    DISCONTD: morphine CR (MS CONTIN) 15 mg CR tablet     Sig: Take 1 Tab by mouth every eight (8) hours. Max Daily Amount: 45 mg. Dispense:  90 Tab     Refill:  0    morphine CR (MS CONTIN) 15 mg CR tablet     Sig: Take 1 Tab by mouth every eight (8) hours. Max Daily Amount: 45 mg. Dispense:  90 Tab     Refill:  0           FOLLOW UP:     No future appointments. PHYSICIANS INVOLVED IN CARE:   Patient Care Team:  Chava Montalvo PA-C as PCP - General (Physician Assistant)  Cris Sherstha MD as Physician (Palliative Medicine)       HISTORY:   Nursing documentation from date of visit reviewed. Reviewed patient-completed ESAS and advance care planning form. Reviewed patient record in prescription monitoring program.    CHIEF COMPLAINT: abdominal pain    HPI/SUBJECTIVE:    The patient is: [x] Verbal / [] Nonverbal (most history obtained from primary caregiver, Patel Joe, due to memory difficulties)    She feels good. She's not having any abdominal pain. She sometimes gets a headache. She usually takes one tylenol for this and it goes away. Her appetite is good, too good! She's moving her bowels regularly. She keeps an eye on this because she likes to have a bowel movement every day. She takes a dulcolax if she goes more than 1 day without a bowel movement (baseline bowel regimen 2 senna twice daily). Her energy is good. She gets out of bed every day and gives herself a sponge bath, then gets dressed, eats breakfast, reads the newspaper.  She walks around the house several times a day to keep her strength up. She is able to get to the bathroom on her own and never has any accidents when she can't make it in time. She and Steven Ulloa continue to go out several times a week. She goes out to get her hair done and sees a podiatrist to get her nails trimmed. Her Tracey Asif, is a constant . She also has an aide in the home with her when Belle's not there just in case she needs help. Clinical Pain Assessment (nonverbal scale for nonverbal patients):   [++++ Clinical Pain Assessment++++]  [++++Pain Severity++++]: Pain: 0  [++++Pain Character++++]:  [++++Pain Duration++++]:  [++++Pain Effect++++]:  [++++Pain Factors++++]:  [++++Pain Frequency++++]:  [++++Pain Location++++]:  [++++ Clinical Pain Assessment++++]       FUNCTIONAL ASSESSMENT:     Palliative Performance Scale (PPS):  PPS: 70       PSYCHOSOCIAL/SPIRITUAL SCREENING:     Any spiritual / Confucianist concerns:  [] Yes /  [x] No    Caregiver Burnout:  [] Yes /  [x] No /  [] No Caregiver Present      Anticipatory grief assessment:   [] Normal  / [] Maladaptive       ESAS Anxiety: Anxiety: 0    ESAS Depression: Depression: 0       REVIEW OF SYSTEMS:     The following systems were [x] reviewed / [] unable to be reviewed  Systems: constitutional, ears/nose/mouth/throat, respiratory, gastrointestinal, genitourinary, musculoskeletal, integumentary, neurologic, psychiatric, endocrine. Positive findings noted below. Modified ESAS Completed by: provider   Fatigue: 0 Drowsiness: 0   Depression: 0 Pain: 0   Anxiety: 0 Nausea: 0   Anorexia: 0 Dyspnea: 0   Best Well-Bein Constipation: No   Other Problem (Comment): 0          PHYSICAL EXAM:     Wt Readings from Last 3 Encounters:   18 158 lb (71.7 kg)   18 154 lb 3.2 oz (69.9 kg)   18 152 lb 12.8 oz (69.3 kg)     Blood pressure 144/51, pulse 73, temperature 97.7 °F (36.5 °C), temperature source Oral, resp. rate 16, height 5' 2\" (1.575 m), weight 158 lb (71.7 kg), SpO2 93 %.   Last bowel movement: See Nursing Note    Constitutional: sitting in wheelchair, appears relaxed  Eyes: pupils equal, anicteric  ENMT: no nasal discharge, moist mucous membranes; HARD OF HEARING  Cardiovascular: regular, rate and rhythm; I/VI MANUEL RUSB  Respiratory: breathing not labored, symmetric  Gastrointestinal: soft non-tender, +bowel sounds  Musculoskeletal: no deformity, no tenderness to palpation; trace bilateral symmetric lower extremity edema  Skin: warm, dry  Neurologic: alert, knows name and can give most history with confusion about details, following commands, moving all extremities  Psychiatric: full affect, no hallucinations  Other:       HISTORY:     Past Medical History:   Diagnosis Date    Cancer ([facility] [address]     Pancreatic    Dry eyes     Hypertension, essential     Migraine       No past surgical history on file. No family history on file. History reviewed, no pertinent family history. Social History   Substance Use Topics    Smoking status: Never Smoker    Smokeless tobacco: Never Used    Alcohol use No     No Known Allergies   Current Outpatient Prescriptions   Medication Sig    morphine CR (MS CONTIN) 15 mg CR tablet Take 1 Tab by mouth every eight (8) hours. Max Daily Amount: 45 mg.    senna-docusate (SENNA PLUS) 8.6-50 mg per tablet Take 2 Tabs by mouth two (2) times a day.  senna-docusate (SENNA PLUS) 8.6-50 mg per tablet Take 1 Tab by mouth daily.  aspirin delayed-release 81 mg tablet Take  by mouth daily as needed for Pain.  DULCOLAX, BISACODYL, PO Take 0.5 Caps by mouth daily as needed. No current facility-administered medications for this visit.            LAB DATA REVIEWED:     No results found for: WBC, HGB, PLT, HGBEXT, PLTEXT, HGBEXT, PLTEXT  No results found for: NA, K, CL, CO2, BUN, CREA, CA, MG, PHOS   No results found for: SGOT, GPT, AP, TBIL, TP, ALB, GLOB, GGT  No results found for: INR, PTMR, PTP, PT1, PT2, APTT   No results found for: IRON, FE, TIBC, IBCT, PSAT, FERR CONTROLLED SUBSTANCES SAFETY ASSESSMENT (IF ON CONTROLLED SUBSTANCES):     Reviewed opioid safety handout:  [x] Yes   [] No  24 hour opioid dose >150mg morphine equivalent/day:  [] Yes   [x] No  Benzodiazepines:  [] Yes   [x] No  Sleep apnea:  [] Yes   [x] No  Urine Toxicology Testing within last 6 months:  [] Yes   [] No  History of or new aberrant medication taking behaviors:  [] Yes   [] No            Total time:   Counseling / coordination time:   > 50% counseling / coordination?:

## 2018-04-11 NOTE — MR AVS SNAPSHOT
2700 [facility] 1200 [facility] [address] [place] [address] 
[phone] Patient: Terese Shen MRN: [id number] Visit Information Date & Time Provider Department Dept. Phone Encounter #  
 2018 10:30 AM Carin Perez MD [facility] [address] [id number] Upcoming Health Maintenance Date Due DTaP/Tdap/Td series (1 - Tdap) 1944 ZOSTER VACCINE AGE 60> 1983 GLAUCOMA SCREENING Q2Y 1988 Bone Densitometry (Dexa) Screening 1988 Pneumococcal 65+ High/Highest Risk (1 of 2 - PCV13) 1988 Influenza Age 5 to Adult 2017 MEDICARE YEARLY EXAM 3/14/2018 Allergies as of 2018  Review Complete On: 2018 By: Alyce Brito LPN No Known Allergies Current Immunizations  Never Reviewed No immunizations on file. Not reviewed this visit You Were Diagnosed With   
  
 Codes Comments Abdominal pain, generalized     ICD-10-CM: R10.84 ICD-9-CM: 789.07 Vitals BP Pulse Temp Resp Height(growth percentile) Weight(growth percentile) 144/51 (BP 1 Location: Right arm, BP Patient Position: Sitting) 73 97.7 °F (36.5 °C) (Oral) 16 5' 2\" (1.575 m) 158 lb (71.7 kg) SpO2 BMI OB Status Smoking Status 93% 28.9 kg/m2 Postmenopausal Never Smoker BMI and BSA Data Body Mass Index Body Surface Area  
 28.9 kg/m 2 1.77 m 2 Preferred Pharmacy Pharmacy Name Phone [facility] [address] [place] [address] [address] [phone] Your Updated Medication List  
  
   
This list is accurate as of 18 11:14 AM.  Always use your most recent med list.  
  
  
  
  
 aspirin delayed-release 81 mg tablet Take  by mouth daily as needed for Pain. DULCOLAX (BISACODYL) PO Take 0.5 Caps by mouth daily as needed. morphine CR 15 mg CR tablet Commonly known as:  MS CONTIN  
 Take 1 Tab by mouth every eight (8) hours. Max Daily Amount: 45 mg. Start taking on:  5/5/2018 SENNA PLUS 8.6-50 mg per tablet Generic drug:  senna-docusate Take 2 Tabs by mouth two (2) times a day. Prescriptions Printed Refills  
 morphine CR (MS CONTIN) 15 mg CR tablet 0 Starting on: 5/5/2018 Sig: Take 1 Tab by mouth every eight (8) hours. Max Daily Amount: 45 mg.  
 Class: Print Route: Oral  
  
Patient Instructions Dear Khadar Solis , It was a pleasure seeing you in the Palliative Medicine Office today. This is what we talked about:  
 
1. Your pain 
-You had severe abdominal pain last summer when you were in the hospital 
-You haven't had any pain since you started taking the pain medicine on a regular basis 
-Continue morphine long-acting 15-mg every 8 hours 
-You are not using any short-acting opioid pain medicines at this time 
-Sandoval Cellar very occasionally gives you a baby aspirin when you have other types of pain like a headache 2. Your bowels 
-You've been having regular bowel movements taking senna 2  Tabs twice a day 
-You occasionally take dulcolax if you go more than 2 days without a bowel movement 3. Your fatigue 
-You energy is good! 4. Your function 
-You walk with a cane or a walker to help prevent falls. You haven't had any falls since your last visit here 
-You have a wheelchair you can use when you go outside of your home 
-You feed yourself and help Sandoval Cellar clean up after dinner by drying the dishes 
-You give yourself sponge baths every day with your aide or Sandoval Cellar close by in case you need any help 
-You are able to get to the bathroom when you need to go and you don't have any accidents 
-Sandoval Cellar helps to organize your medications for you 
-You have someone with you in your home all the time just in case you need help 5. What's important to you 
-You really enjoy reading, especially mysteries! -You and [facility] go out once a week for a social activity such as going to [place] on Thursday nights for jazz night 
-You go out to get your hair done and to get regular pedicures 
-You enjoy the company of your Parvin Fowler, you beloved canine  
-You and [facility] are enjoying looking at there flowers in the neighborhood which are starting to bloom 
-We talked today about getting back in touch with your primary care provider, Casey LIAO, to see if she want to see you for a check-up This is what you have shared with us about Advance Care Planning Advance Care Planning 4/11/2018 Patient's Healthcare Decision Maker is: Named in scanned ACP document Primary Decision Maker Name Uriel Alanis Primary Decision Maker Phone Number [phone] Primary Decision Maker Relationship to Patient Other relative Confirm Advance Directive Yes, on file Does the patient have other document types Do Not Resuscitate The Palliative Medicine Team is here to support you and your family. We will see you again in 8 weeks. Our Nurse Navigator Richie Crump will check in with you by phone before that time. If there are any concerns before that time, such as medication questions, worsening symptoms or a need to see a physician for an urgent or emergent situation; please call [phone] (COPE). A physician is also on call after our normal business hours of 8am to 5pm.  
 
In order to serve you better, please allow up to 48 hours for prescription refills to be processed. Certain medications may require more paperwork or a written prescription that you may need to  from the office. We appreciate you letting us know of any refill requests as soon as possible. We also would like you to sign up for [employer] as well. Sincerely, Tino Baker MD and the Palliative Medicine Team 
 
 
  
Introducing hospitals & HEALTH SERVICES!    
 [employer] introduces [employer] patient portal. Now you can access parts of your medical record, email your doctor's office, and request medication refills online. 1. In your internet browser, go to [email or web address] 2. Click on the First Time User? Click Here link in the Sign In box. You will see the New Member Sign Up page. 3. Enter your [employer] Access Code exactly as it appears below. You will not need to use this code after youve completed the sign-up process. If you do not sign up before the expiration date, you must request a new code. · [employer] Access Code: [email or web address] Expires: 2018  1:58 PM 
 
4. Enter the last four digits of your Social Security Number (xxxx) and Date of Birth (mm/dd/yyyy) as indicated and click Submit. You will be taken to the next sign-up page. 5. Create a [employer] ID. This will be your [employer] login ID and cannot be changed, so think of one that is secure and easy to remember. 6. Create a [employer] password. You can change your password at any time. 7. Enter your Password Reset Question and Answer. This can be used at a later time if you forget your password. 8. Enter your e-mail address. You will receive e-mail notification when new information is available in [address] 9. Click Sign Up. You can now view and download portions of your medical record. 10. Click the Download Summary menu link to download a portable copy of your medical information. If you have questions, please visit the Frequently Asked Questions section of the [employer] website. Remember, [employer] is NOT to be used for urgent needs. For medical emergencies, dial 911. Now available from your iPhone and Android! Please provide this summary of care documentation to your next provider. Your primary care clinician is listed as Samina Mo. If you have any questions after today's visit, please call [phone].

## 2018-04-11 NOTE — PROGRESS NOTES
Palliative Medicine Office Visit  Palliative Medicine Nurse Check In  [phone]    Patient Name: Delano Trejo  YOB: 1923      Date of Office Visit: 4/11/2018      Patient states: The Niece said she is doing good no problems with the transition from Hospice to Palliative. From Check In Sheet (scanned in Media):  Has a medical provider talked with you about cardiopulmonary resuscitation (CPR)? [x] Yes   [] No   [] Unable to obtain    Nurse reminder to complete or update ACP FlowSheet:    Is ACP on the Problem List?    [x] Yes    [] No  IF ACP Document is ON FILE; Nurse to place ACP on Problem List     Is there an ACP Note in Chart Review/Note? [x] Yes    [] No   If NO: [address] 4/11/2018   Patient's Healthcare Decision Maker is: Named in scanned ACP document   Primary Decision Maker Name Soto Hadley   Primary Decision Maker Phone Number [phone]   Primary Decision Maker Relationship to Patient Other relative   Confirm Advance Directive Yes, on file   Does the patient have other document types Do Not Resuscitate       Is there anything that we should know about you as a person in order to provide you the best care possible? No    Have you been to the ER, urgent care clinic since your last visit? [] Yes   [x] No   [] Unable to obtain    Have you been hospitalized since your last visit? [] Yes   [x] No   [] Unable to obtain    Have you seen or consulted any other health care providers outside of the [facility] since your last visit? [] Yes   [x] No   [] Unable to obtain    Functional status (describe):         Last BM:  4/10/18     accessed (date): 4/10/18    Bottle review (for opioid pain medication): Patient did not bring any medication with.   Medication 1:   Date filled:   Directions:   # filled:   # left:   # pills taking per day:  Last dose taken:    Medication 2:   Date filled:   Directions:   # filled:   # left:   # pills taking per day:  Last dose taken:    Medication 3:   Date filled:   Directions:   # filled:   # left:   # pills taking per day:  Last dose taken:    Medication 4:   Date filled:   Directions:   # filled:   # left:   # pills taking per day:  Last dose taken:

## 2018-04-11 NOTE — PATIENT INSTRUCTIONS
Dear Arminda Knight ,    It was a pleasure seeing you in the Palliative Medicine Office today. This is what we talked about:     1. Your pain  -You had severe abdominal pain last summer when you were in the hospital  -You haven't had any pain since you started taking the pain medicine on a regular basis  -Continue morphine long-acting 15-mg every 8 hours  -You are not using any short-acting opioid pain medicines at this time  -Lubna Altamirano very occasionally gives you a baby aspirin when you have other types of pain like a headache    2. Your bowels  -You've been having regular bowel movements taking senna 2  Tabs twice a day  -You occasionally take dulcolax if you go more than 2 days without a bowel movement    3. Your fatigue  -You energy is good! 4. Your function  -You walk with a cane or a walker to help prevent falls. You haven't had any falls since your last visit here  -You have a wheelchair you can use when you go outside of your home  -You feed yourself and help Lubna Altamirano clean up after dinner by drying the dishes  -You give yourself sponge baths every day with your aide or Lubna Evelia close by in case you need any help  -You are able to get to the bathroom when you need to go and you don't have any accidents  -Lubna Altamirano helps to organize your medications for you  -You have someone with you in your home all the time just in case you need help    5.  What's important to you  -You really enjoy reading, especially mysteries!  -You and Lubna Altamirano go out once a week for a social activity such as going to [place] on Thursday nights for jazz night  -You go out to get your hair done and to get regular pedicures  -You enjoy the company of your Kory Jigna, you beloved canine   -You and Lubna Altamirano are enjoying looking at there flowers in the neighborhood which are starting to bloom  -We talked today about getting back in touch with your primary care provider, Nettie LIAO, to see if she want to see you for a check-up    This is what you have shared with us about Advance Care Planning  Advance Care Planning 4/11/2018   Patient's Healthcare Decision Maker is: Named in scanned ACP document   Primary Decision Maker Name Moreno Harman   Primary Decision Maker Phone Number [phone]   Primary Decision Maker Relationship to Patient Other relative   Confirm Advance Directive Yes, on file   Does the patient have other document types Do Not Resuscitate         The Palliative Medicine Team is here to support you and your family. We will see you again in 8 weeks. Our Nurse Navigator Houston Alejo will check in with you by phone before that time. If there are any concerns before that time, such as medication questions, worsening symptoms or a need to see a physician for an urgent or emergent situation; please call [phone] (COPE). A physician is also on call after our normal business hours of 8am to 5pm.     In order to serve you better, please allow up to 48 hours for prescription refills to be processed. Certain medications may require more paperwork or a written prescription that you may need to  from the office. We appreciate you letting us know of any refill requests as soon as possible. We also would like you to sign up for [employer] as well.     Sincerely,      Stacie Cervantes MD and the Palliative Medicine Team

## 2018-06-04 ENCOUNTER — TELEPHONE (OUTPATIENT)
Dept: PALLATIVE CARE | Age: 83
End: 2018-06-04

## 2018-06-04 NOTE — TELEPHONE ENCOUNTER
Called patient to advise/confirm upcoming appt with Dr. Salima Wagoner on      6/6/18 at 10:30am at [facility]. No answer so left voicemail   Also advised to please bring in your Drivers License and Insurance Card with you to appointment as well as any prescription pain medication in the original container with you to appt.

## 2018-06-06 ENCOUNTER — OFFICE VISIT (OUTPATIENT)
Dept: PALLATIVE CARE | Age: 83
End: 2018-06-06

## 2018-06-06 VITALS
RESPIRATION RATE: 16 BRPM | TEMPERATURE: 98.3 F | BODY MASS INDEX: 28.78 KG/M2 | SYSTOLIC BLOOD PRESSURE: 129 MMHG | DIASTOLIC BLOOD PRESSURE: 54 MMHG | HEIGHT: 62 IN | OXYGEN SATURATION: 93 % | WEIGHT: 156.4 LBS | HEART RATE: 69 BPM

## 2018-06-06 DIAGNOSIS — R53.83 FATIGUE, UNSPECIFIED TYPE: ICD-10-CM

## 2018-06-06 DIAGNOSIS — K59.03 DRUG-INDUCED CONSTIPATION: Primary | ICD-10-CM

## 2018-06-06 DIAGNOSIS — R10.84 ABDOMINAL PAIN, GENERALIZED: ICD-10-CM

## 2018-06-06 DIAGNOSIS — K86.89 PANCREATIC MASS: ICD-10-CM

## 2018-06-06 RX ORDER — MORPHINE SULFATE 15 MG/1
15 TABLET, FILM COATED, EXTENDED RELEASE ORAL EVERY 8 HOURS
Qty: 90 TAB | Refills: 0 | Status: SHIPPED | OUTPATIENT
Start: 2018-07-05 | End: 2018-08-08 | Stop reason: SDUPTHER

## 2018-06-06 RX ORDER — MORPHINE SULFATE 15 MG/1
15 TABLET, FILM COATED, EXTENDED RELEASE ORAL EVERY 8 HOURS
Qty: 90 TAB | Refills: 0 | Status: SHIPPED | OUTPATIENT
Start: 2018-06-06 | End: 2018-06-06 | Stop reason: SDUPTHER

## 2018-06-06 NOTE — PROGRESS NOTES
Palliative Medicine Office Visit  Palliative Medicine Nurse Check In  ([phone])    Patient Name: Arminda Knight  YOB: 1923      Date of Office Visit: 6/6/2018      Patient states: Patient is here for a follow up appointment no issues. From Check In Sheet (scanned in Media):  Has a medical provider talked with you about cardiopulmonary resuscitation (CPR)? [x] Yes   [] No   [] Unable to obtain    Nurse reminder to complete or update ACP FlowSheet:    Is ACP on the Problem List?    [x] Yes    [] No  IF ACP Document is ON FILE; Nurse to place ACP on Problem List     Is there an ACP Note in Chart Review/Note? [x] Yes    [] No   If NO: [address] 4/11/2018   Patient's Healthcare Decision Maker is: Named in scanned ACP document   Primary Decision Maker Name Carolynn Maurice   Primary Decision Maker Phone Number [phone]   Primary Decision Maker Relationship to Patient Other relative   Confirm Advance Directive Yes, on file   Does the patient have other document types Do Not Resuscitate       Is there anything that we should know about you as a person in order to provide you the best care possible? No  Have you been to the ER, urgent care clinic since your last visit? [] Yes   [] No   [] Unable to obtain    Have you been hospitalized since your last visit? [] Yes   [x] No   [] Unable to obtain    Have you seen or consulted any other health care providers outside of the [address] [place] [address] since your last visit? [] Yes   [x] No   [] Unable to obtain    Functional status (describe):     Last BM:  6/5/18     accessed (date): 6/5/18    Bottle review (for opioid pain medication):  Medication 1: morphine CR (MS CONTIN) 15 mg CR tablet     Date filled: 5/7/18  Directions: Take 1 Tab by mouth every eight (8) hours.  Max Daily Amount: 45 mg.              # filled: 90 tabs  # left: 10  # pills taking per day:3  Last dose taken:6AM    Medication 2:   Date filled:   Directions:   # filled:   # left:   # pills taking per day:  Last dose taken:    Medication 3:   Date filled:   Directions:   # filled:   # left:   # pills taking per day:  Last dose taken:    Medication 4:   Date filled:   Directions:   # filled:   # left:   # pills taking per day:  Last dose taken:

## 2018-06-06 NOTE — PATIENT INSTRUCTIONS
Dear Jacqui England ,    It was a pleasure seeing you in the Palliative Medicine Office today. This is what we talked about:     1. Your abdominal pain  -Continue morphine long-acting 15-mg every 8 hours  -You are not using any short-acting opioid pain medicines at this time  -Emily Body very occasionally gives you a baby aspirin when you have other types of pain like a headache    2. Your bowels  -You've been having regular bowel movements taking senna 2  tabs twice a day  -You occasionally take dulcolax if you go more than 2 days without a bowel movement    3. Your fatigue  -You energy is good! 4. Your function(we talked about this at length during your last visit)  -You walk with a cane or a walker to help prevent falls. You haven't had any falls since your last visit here  -You have a wheelchair you can use when you go outside of your home  -You feed yourself and help Emily Body clean up after dinner by drying the dishes  -You give yourself sponge baths every day with your aide or Meily Body close by in case you need any help  -You are able to get to the bathroom when you need to go and you don't have any accidents  -Emily Body helps to organize your medications for you  -You have someone with you in your home all the time just in case you need help    5. What's important to you  -You really enjoy reading, especially mysteries!  -You and Emily Body continue to go out once a week for a fun activity and once a week for such things as getting your hair done or going to the grocery store  -You enjoy the company of your Janessa Latham, you beloved canine   -You have a birthday coming up. ..you will be 89 on the 27th of this month! Happy birthday and I hope you enjoy the activities you and Emily Body have planned to celebrate.     This is what you have shared with us about Mary Schmid 79. Planning 4/11/2018   Patient's Healthcare Decision Maker is: Named in scanned ACP document   Primary Decision Maker Name Chino Martinez Primary Decision Maker Phone Number [phone]   Primary Decision Maker Relationship to Patient Other relative   Confirm Advance Directive Yes, on file   Does the patient have other document types Do Not Resuscitate         The Palliative Medicine Team is here to support you and your family. We will see you again in 8 weeks. Our Nurse Navigator Kalli Key will check in with you by phone before that time. If there are any concerns before that time, such as medication questions, worsening symptoms or a need to see a physician for an urgent or emergent situation; please call [phone] (COPE). A physician is also on call after our normal business hours of 8am to 5pm.     In order to serve you better, please allow up to 48 hours for prescription refills to be processed. Certain medications may require more paperwork or a written prescription that you may need to  from the office. We appreciate you letting us know of any refill requests as soon as possible. We also would like you to sign up for [employer] as well.     Sincerely,      Malik Cao MD and the Palliative Medicine Team

## 2018-06-06 NOTE — PROGRESS NOTES
Palliative Medicine Outpatient Services  [place]: [phone] ([phone])    Patient Name: Jerald Mays  YOB: 1923    Date of Current Visit: 18  Location of Current Visit:    [x] [facility] Office  [] [facility] Office  [] [facility] Office  [] Home  [] Other:      Date of Initial Visit: 18   Requesting Physician: Beatrice Ta PA-C  Primary Care Physician: Samina Mo PA-C      SUMMARY:   Jerald Mays is a 80y.o. year old with a past history of pancreatic mass by CT suspicious for malignancy, who was referred to Palliative Medicine by Ms. Debby Aleman for symptom management and supportive care. She was hospitalized in [place] in 2017 with decreased appetite, functional decline and abdominal pain. 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 She declined further work-up. She was given a presumptive diagnosis of pancreatic cancer. She was discharged to live with er closest living relative, 2nd cousin/POA, Maxwell Colunga, and was followed by [facility] until recently when she was discharged from hospice for failure to decline. She's thrived since hospital discharge, maintaining her functional abilities and with a 25# weight gain. The patients social history includes: she is . She has no living siblings. She lives in [place] with her second cousin, Maxwell Colunga, and her beloved Westlake Box. Palliative Medicine is addressing the following current patient/family concerns: abdominal pain, mild fatigue, constipation. PALLIATIVE DIAGNOSES:       ICD-10-CM ICD-9-CM    1. Drug-induced constipation K59.03 564.09      E980.5    2. Abdominal pain, generalized R10.84 789.07 morphine CR (MS CONTIN) 15 mg CR tablet      DISCONTINUED: morphine CR (MS CONTIN) 15 mg CR tablet   3. Fatigue, unspecified type R53.83 780.79    4.  Pancreatic mass K86.9 577.9           PLAN:   Patient Instructions     Dear Jerald Mays ,    It was a pleasure seeing you in the Palliative Medicine Office today. This is what we talked about:     1. Your abdominal pain  -Continue morphine long-acting 15-mg every 8 hours  -You are not using any short-acting opioid pain medicines at this time  -Carlos Snows very occasionally gives you a baby aspirin when you have other types of pain like a headache    2. Your bowels  -You've been having regular bowel movements taking senna 2  tabs twice a day  -You occasionally take dulcolax if you go more than 2 days without a bowel movement    3. Your fatigue  -You energy is good! 4. Your function(we talked about this at length during your last visit)  -You walk with a cane or a walker to help prevent falls. You haven't had any falls since your last visit here  -You have a wheelchair you can use when you go outside of your home  -You feed yourself and help Gonzalez Gess clean up after dinner by drying the dishes  -You give yourself sponge baths every day with your aide or Carlos Amaya close by in case you need any help  -You are able to get to the bathroom when you need to go and you don't have any accidents  -Carlos Amaya helps to organize your medications for you  -You have someone with you in your home all the time just in case you need help    5. What's important to you  -You really enjoy reading, especially mysteries!  -You and Carlos Snows continue to go out once a week for a fun activity and once a week for such things as getting your hair done or going to the grocery store  -You enjoy the company of your Andreeadelphine Pleitez, you beloved canine   -You have a birthday coming up. ..you will be 89 on the 27th of this month! Happy birthday and I hope you enjoy the activities you and Gonzalez Edys have planned to celebrate.     This is what you have shared with us about Mary Schmid 79. Planning 4/11/2018   Patient's Healthcare Decision Maker is: Named in scanned ACP document   Primary Decision Maker Name Simón Kenyon   Primary Decision Maker Phone Number [phone]   Primary Decision Maker Relationship to Patient Other relative   Confirm Advance Directive Yes, on file   Does the patient have other document types Do Not Resuscitate         The Palliative Medicine Team is here to support you and your family. We will see you again in 8 weeks. Our Nurse Navigator Lamont Ibrahim will check in with you by phone before that time. If there are any concerns before that time, such as medication questions, worsening symptoms or a need to see a physician for an urgent or emergent situation; please call [phone] (COPE). A physician is also on call after our normal business hours of 8am to 5pm.     In order to serve you better, please allow up to 48 hours for prescription refills to be processed. Certain medications may require more paperwork or a written prescription that you may need to  from the office. We appreciate you letting us know of any refill requests as soon as possible. We also would like you to sign up for [employer] as well.     Sincerely,      Marilyn Varela MD and the Palliative Medicine Team      Counseling and Coordination: note routed to [address] / TREATMENT PREFERENCES:   [====Goals of Care====]  GOALS OF CARE:  Patient / health care proxy stated goals: maintenance of quality of life      TREATMENT PREFERENCES:   Code Status:  [] Attempt Resuscitation       [x] Do Not Attempt Resuscitation    Advance Care Planning:  Advance Care Planning 4/11/2018   Patient's Healthcare Decision Maker is: Named in scanned ACP document   Primary Decision Maker Name Livier Thibodeaux   Primary Decision Maker Phone Number [phone]   Primary Decision Maker Relationship to Patient Other relative   Confirm Advance Directive Yes, on file   Does the patient have other document types Do Not Resuscitate       Other:  (If patient appropriate for POST, consider using PALLPOST smart phrase here)    The palliative care team has discussed with patient / health care proxy about goals of care / treatment preferences for patient.  [====Goals of Care====]         PRESCRIPTIONS GIVEN:     No orders of the defined types were placed in this encounter. FOLLOW UP:     Future Appointments  Date Time Provider Getachew Pearson   6/6/2018 10:30 AM Jimena Calvert MD [address] [place] [address] [place] IN CARE:   Patient Care Team:  Yrui Myrick PA-C as PCP - General (Physician Assistant)  Jimena Calvert MD as Physician (Palliative Medicine)       HISTORY:   Nursing documentation from date of visit reviewed. Reviewed patient-completed ESAS and advance care planning form. Reviewed patient record in prescription monitoring program.    CHIEF COMPLAINT: abdominal pain    HPI/SUBJECTIVE:    The patient is: [x] Verbal / [] Nonverbal (most history obtained from primary caregiver, Anne Marie Augustin, due to memory difficulties)    She feels good. She's not having any abdominal pain. She gets her pain medicine 3 times a day and this works well for her. She usually has a mild headache in the morning when she wakes up. She takes a baby aspirin and this goes away    Her appetite is good! She's moving her bowels regularly. Her energy is good. She gets out of bed every day. She reads the newspaper every day. She sets the table at dinner time and dries and puts the dishes away. She uses a four-pronged cane to walk around the house and her wheelchair when she and Ami Console go out. She continues to dress herself and give herself sponge baths. Ami Console helps her with a tub bath several times a week. She goes to the bathroom by herself. She and Ami Console continue to go out several times a week. She goes out to get her hair done and sees a podiatrist to get her nails trimmed (but wishes she could trim her own toenails because sometimes he cuts them too close!). Her Lennice Anger, is a constant .  She also has an aide in the home with her when Belle's not there just in case she needs help. She turns 89 in a few weeks. She and Shakila Morrison are celebrating with a tea at the [employer] for the two of them, then are chartering a river boat trip on the [place] for a larger group. Clinical Pain Assessment (nonverbal scale for nonverbal patients):   [++++ Clinical Pain Assessment++++]  [++++Pain Severity++++]: Pain: 0  [++++Pain Character++++]:  [++++Pain Duration++++]:  [++++Pain Effect++++]:  [++++Pain Factors++++]:  [++++Pain Frequency++++]:  [++++Pain Location++++]:  [++++ Clinical Pain Assessment++++]       FUNCTIONAL ASSESSMENT:     Palliative Performance Scale (PPS):  PPS: 70       PSYCHOSOCIAL/SPIRITUAL SCREENING:     Any spiritual / Catholic concerns:  [] Yes /  [x] No    Caregiver Burnout:  [] Yes /  [x] No /  [] No Caregiver Present      Anticipatory grief assessment:   [] Normal  / [] Maladaptive       ESAS Anxiety: Anxiety: 0    ESAS Depression: Depression: 0       REVIEW OF SYSTEMS:     The following systems were [x] reviewed / [] unable to be reviewed  Systems: constitutional, ears/nose/mouth/throat, respiratory, gastrointestinal, genitourinary, musculoskeletal, integumentary, neurologic, psychiatric, endocrine. Positive findings noted below. Modified ESAS Completed by: provider   Fatigue: 0 Drowsiness: 0   Depression: 0 Pain: 0   Anxiety: 0 Nausea: 0   Anorexia: 0 Dyspnea: 0   Best Well-Bein Constipation: No   Other Problem (Comment): 0          PHYSICAL EXAM:     Wt Readings from Last 3 Encounters:   18 156 lb 6.4 oz (70.9 kg)   18 158 lb (71.7 kg)   18 154 lb 3.2 oz (69.9 kg)     Blood pressure 129/54, pulse 69, temperature 98.3 °F (36.8 °C), temperature source Oral, resp. rate 16, height 5' 2\" (1.575 m), weight 156 lb 6.4 oz (70.9 kg), SpO2 93 %.   Last bowel movement: See Nursing Note    Constitutional: sitting in wheelchair, appears relaxed  Eyes: pupils equal, anicteric  ENMT: no nasal discharge, moist mucous membranes; HARD OF HEARING  Cardiovascular: regular, rate and rhythm; I/VI MANUEL RUSB  Respiratory: breathing not labored, symmetric  Gastrointestinal: soft non-tender, +bowel sounds  Musculoskeletal: no deformity, no tenderness to palpation; trace bilateral symmetric lower extremity edema  Skin: warm, dry  Neurologic: alert, knows name and can give most history with confusion about details, following commands, moving all extremities  Psychiatric: full affect, no hallucinations  Other:       HISTORY:     Past Medical History:   Diagnosis Date    Cancer ([facility] [address]     Pancreatic    Dry eyes     Hypertension, essential     Migraine       No past surgical history on file. No family history on file. History reviewed, no pertinent family history. Social History   Substance Use Topics    Smoking status: Never Smoker    Smokeless tobacco: Never Used    Alcohol use No     No Known Allergies   Current Outpatient Prescriptions   Medication Sig    morphine CR (MS CONTIN) 15 mg CR tablet Take 1 Tab by mouth every eight (8) hours. Max Daily Amount: 45 mg.    aspirin delayed-release 81 mg tablet Take  by mouth daily as needed for Pain.  senna-docusate (SENNA PLUS) 8.6-50 mg per tablet Take 2 Tabs by mouth two (2) times a day.  DULCOLAX, BISACODYL, PO Take 0.5 Caps by mouth daily as needed. No current facility-administered medications for this visit.            LAB DATA REVIEWED:     No results found for: WBC, HGB, PLT, HGBEXT, PLTEXT, HGBEXT, PLTEXT  No results found for: NA, K, CL, CO2, BUN, CREA, CA, MG, PHOS   No results found for: SGOT, GPT, AP, TBIL, TP, ALB, GLOB, GGT  No results found for: INR, PTMR, PTP, PT1, PT2, APTT   No results found for: IRON, FE, TIBC, IBCT, PSAT, FERR        CONTROLLED SUBSTANCES SAFETY ASSESSMENT (IF ON CONTROLLED SUBSTANCES):     Reviewed opioid safety handout:  [x] Yes   [] No  24 hour opioid dose >150mg morphine equivalent/day:  [] Yes   [x] No  Benzodiazepines:  [] Yes   [x] No  Sleep apnea:  [] Yes   [x] No  Urine Toxicology Testing within last 6 months:  [] Yes   [] No  History of or new aberrant medication taking behaviors:  [] Yes   [] No            Total time:   Counseling / coordination time:   > 50% counseling / coordination?:

## 2018-08-07 ENCOUNTER — TELEPHONE (OUTPATIENT)
Dept: PALLATIVE CARE | Age: 83
End: 2018-08-07

## 2018-08-07 NOTE — TELEPHONE ENCOUNTER
Called patient to advise/confirm upcoming appt with Dr. Michelle Salas on    8/8/18  at 10:30am at [facility]. Venita Bahena confirmed. Also advised to please bring in your Drivers License and Insurance Card with you to appointment as well as any prescription pain medication in the original container with you to appt.

## 2018-08-08 ENCOUNTER — OFFICE VISIT (OUTPATIENT)
Dept: PALLATIVE CARE | Age: 83
End: 2018-08-08

## 2018-08-08 VITALS
OXYGEN SATURATION: 91 % | HEIGHT: 62 IN | DIASTOLIC BLOOD PRESSURE: 50 MMHG | HEART RATE: 74 BPM | BODY MASS INDEX: 29.59 KG/M2 | TEMPERATURE: 98.3 F | WEIGHT: 160.8 LBS | RESPIRATION RATE: 16 BRPM | SYSTOLIC BLOOD PRESSURE: 126 MMHG

## 2018-08-08 DIAGNOSIS — R41.3 MEMORY DIFFICULTIES: ICD-10-CM

## 2018-08-08 DIAGNOSIS — R53.83 FATIGUE, UNSPECIFIED TYPE: ICD-10-CM

## 2018-08-08 DIAGNOSIS — R10.84 ABDOMINAL PAIN, GENERALIZED: Primary | ICD-10-CM

## 2018-08-08 DIAGNOSIS — K59.03 DRUG-INDUCED CONSTIPATION: ICD-10-CM

## 2018-08-08 DIAGNOSIS — K86.89 PANCREATIC MASS: ICD-10-CM

## 2018-08-08 DIAGNOSIS — R31.9 HEMATURIA OF UNDIAGNOSED CAUSE: ICD-10-CM

## 2018-08-08 RX ORDER — MORPHINE SULFATE 15 MG/1
15 TABLET, FILM COATED, EXTENDED RELEASE ORAL EVERY 8 HOURS
Qty: 90 TAB | Refills: 0 | Status: SHIPPED | OUTPATIENT
Start: 2018-08-08 | End: 2018-08-08 | Stop reason: SDUPTHER

## 2018-08-08 RX ORDER — MORPHINE SULFATE 15 MG/1
15 TABLET, FILM COATED, EXTENDED RELEASE ORAL EVERY 8 HOURS
Qty: 90 TAB | Refills: 0 | Status: SHIPPED | OUTPATIENT
Start: 2018-09-05 | End: 2018-10-03 | Stop reason: SDUPTHER

## 2018-08-08 NOTE — PROGRESS NOTES
Palliative Medicine Office Visit  Palliative Medicine Nurse Check In  ([phone])    Patient Name: Franci Echevarria  YOB: 1923      Date of Office Visit: 8/8/2018      Patient states: Patient had blood in urine on Sunday 3 episodes no pain upon voiding. From Check In Sheet (scanned in Media):  Has a medical provider talked with you about cardiopulmonary resuscitation (CPR)? [x] Yes   [] No   [] Unable to obtain    Nurse reminder to complete or update ACP FlowSheet:    Is ACP on the Problem List?    [x] Yes    [] No  IF ACP Document is ON FILE; Nurse to place ACP on Problem List     Is there an ACP Note in Chart Review/Note? [x] Yes    [] No   If NO: [address] 4/11/2018   Patient's Healthcare Decision Maker is: Named in scanned ACP document   Primary Decision Maker Name Balbina Crawford   Primary Decision Maker Phone Number [phone]   Primary Decision Maker Relationship to Patient Other relative   Confirm Advance Directive Yes, on file   Does the patient have other document types Do Not Resuscitate       Is there anything that we should know about you as a person in order to provide you the best care possible? No    Have you been to the ER, urgent care clinic since your last visit? [] Yes   [x] No   [] Unable to obtain    Have you been hospitalized since your last visit? [] Yes   [x] No   [] Unable to obtain    Have you seen or consulted any other health care providers outside of the [facility] since your last visit? [] Yes   [x] No   [] Unable to obtain    Functional status (describe):       Last BM: 8/7/18     accessed (date):8/7/18     Bottle review (for opioid pain medication):  Medication 1: morphine CR (MS CONTIN) 15 mg CR tablet [[id number]]       Date filled: 7/7/18  Directions: Take 1 Tab by mouth every eight (8) hours.  Max Daily Amount: 45 mg  # filled: 90  # left: 4  # pills taking per day:3  Last dose taken:7AM    Medication 2:   Date filled:   Directions:   # filled:   # left:   # pills taking per day:  Last dose taken:    Medication 3:   Date filled:   Directions:   # filled:   # left:   # pills taking per day:  Last dose taken:    Medication 4:   Date filled:   Directions:   # filled:   # left:   # pills taking per day:  Last dose taken:

## 2018-08-08 NOTE — MR AVS SNAPSHOT
[address] [address] [address] 
[phone] Patient: Long Sotomayor MRN: [id number] Visit Information Date & Time Provider Department Dept. Phone Encounter #  
 2018 10:30 AM Jessie Polk MD [place] [id number] Upcoming Health Maintenance Date Due DTaP/Tdap/Td series (1 - Tdap) 1944 ZOSTER VACCINE AGE 60> 1983 GLAUCOMA SCREENING Q2Y 1988 Bone Densitometry (Dexa) Screening 1988 Pneumococcal 65+ High/Highest Risk (1 of 2 - PCV13) 1988 MEDICARE YEARLY EXAM 3/14/2018 Influenza Age 5 to Adult 2018 Allergies as of 2018  Review Complete On: 2018 By: Panchito Gallardo LPN No Known Allergies Current Immunizations  Never Reviewed No immunizations on file. Not reviewed this visit You Were Diagnosed With   
  
 Codes Comments Abdominal pain, generalized     ICD-10-CM: R10.84 ICD-9-CM: 789.07 Vitals BP Pulse Temp Resp Height(growth percentile) Weight(growth percentile) 126/50 (BP 1 Location: Left arm, BP Patient Position: Sitting) 74 98.3 °F (36.8 °C) (Oral) 16 5' 2\" (1.575 m) 160 lb 12.8 oz (72.9 kg) SpO2 BMI OB Status Smoking Status 91% 29.41 kg/m2 Postmenopausal Never Smoker BMI and BSA Data Body Mass Index Body Surface Area  
 29.41 kg/m 2 1.79 m 2 Preferred Pharmacy Pharmacy Name Phone [facility] Pending sale to [facility] [place] [address] [address] [phone] Your Updated Medication List  
  
   
This list is accurate as of 18 10:56 AM.  Always use your most recent med list.  
  
  
  
  
 aspirin delayed-release 81 mg tablet Take  by mouth daily as needed for Pain. DULCOLAX (BISACODYL) PO Take 0.5 Caps by mouth daily as needed. morphine CR 15 mg CR tablet Commonly known as:  MS CONTIN Take 1 Tab by mouth every eight (8) hours. Max Daily Amount: 45 mg. Start taking on:  9/5/2018 SENNA PLUS 8.6-50 mg per tablet Generic drug:  senna-docusate Take 2 Tabs by mouth two (2) times a day. Prescriptions Printed Refills  
 morphine CR (MS CONTIN) 15 mg CR tablet 0 Starting on: 9/5/2018 Sig: Take 1 Tab by mouth every eight (8) hours. Max Daily Amount: 45 mg.  
 Class: Print Route: Oral  
  
Patient Instructions Dear Case Naidu , It was a pleasure seeing you in the Palliative Medicine Office today. This is what we talked about:  
 
1. Your abdominal pain 
-You've had no pain since you've been taking the morphine 
-Continue morphine long-acting 15-mg every 8 hours 
-You are not using any short-acting opioid pain medicines at this time 
-Zuleima Birmingham very occasionally gives you a baby aspirin when you have other types of pain like a headache 2. Your bowels 
-You've been having regular bowel movements taking senna 2  tabs twice a day 
-You occasionally take dulcolax if you go more than 2 days without a bowel movement 3. Your fatigue 
-You energy is good! 4. Your appetite 
-Your appetite is good! 
-You enjoy several snacks a day 
-You are concerned about your weight gain 
-I reviewed your weights and your medical history 
-I'm not concerned about your weight 
-I encourage you to continue to enjoy your meals and your snacks! 5. The blood in your urine 
-Your aide noticed bright red blood in your urine for a few days 
-You didn't have any pain or discomfort with this 
-This hasn't occurred again 
-You and Zuleima Birmingham are watching out for it and willcall to make an appointment with your PCP if that happens 
-Otherwise, you will see your PCP for a routine check-up in September 6. Your function(we talked about this at length during your last visit) -You walk with a cane or a walker to help prevent falls.  You haven't had any falls since your last visit here 
-You have a wheelchair you can use when you go outside of your home 
-You feed yourself and help Fleeta Puller clean up after dinner by drying the dishes 
-You give yourself sponge baths every day with your aide or Fleeta Puller close by in case you need any help 
-You are able to get to the bathroom when you need to go and you don't have any accidents 
-Fleeta Puller helps to organize your medications for you 
-You have an aide in your home to be with you when Fleeta Puller is at work 
-Cristo Mora and Noah Santiago continue to go out twice a week for chores and for fun This is what you have shared with us about Advance Care Planning Advance Care Planning 4/11/2018 Patient's Healthcare Decision Maker is: Named in scanned ACP document Primary Decision Maker Name Hoang Gatica Primary Decision Maker Phone Number [phone] Primary Decision Maker Relationship to Patient Other relative Confirm Advance Directive Yes, on file Does the patient have other document types Do Not Resuscitate The Palliative Medicine Team is here to support you and your family. We will see you again in 8 weeks. Our Nurse Navigator Simi Shell will check in with you by phone before that time. If there are any concerns before that time, such as medication questions, worsening symptoms or a need to see a physician for an urgent or emergent situation; please call [phone] (COPE). A physician is also on call after our normal business hours of 8am to 5pm.  
 
In order to serve you better, please allow up to 48 hours for prescription refills to be processed. Certain medications may require more paperwork or a written prescription that you may need to  from the office. We appreciate you letting us know of any refill requests as soon as possible. We also would like you to sign up for [employer] as well. Sincerely, Jessie Polk MD and the Palliative Medicine Team 
 
 
  
Introducing [facility]! [employer] introduces [employer] patient portal. Now you can access parts of your medical record, email your doctor's office, and request medication refills online. 1. In your internet browser, go to [email or web address] 2. Click on the First Time User? Click Here link in the Sign In box. You will see the New Member Sign Up page. 3. Enter your [employer] Access Code exactly as it appears below. You will not need to use this code after youve completed the sign-up process. If you do not sign up before the expiration date, you must request a new code. · [employer] Access Code: [email or web address] Expires: 9/4/2018 11:04 AM 
 
4. Enter the last four digits of your Social Security Number (xxxx) and Date of Birth (mm/dd/yyyy) as indicated and click Submit. You will be taken to the next sign-up page. 5. Create a Gera-IT ID. This will be your [employer] login ID and cannot be changed, so think of one that is secure and easy to remember. 6. Create a [employer] password. You can change your password at any time. 7. Enter your Password Reset Question and Answer. This can be used at a later time if you forget your password. 8. Enter your e-mail address. You will receive e-mail notification when new information is available in [address] 9. Click Sign Up. You can now view and download portions of your medical record. 10. Click the Download Summary menu link to download a portable copy of your medical information. If you have questions, please visit the Frequently Asked Questions section of the [employer] website. Remember, [employer] is NOT to be used for urgent needs. For medical emergencies, dial 911. Now available from your iPhone and Android! Please provide this summary of care documentation to your next provider. Your primary care clinician is listed as Chantelle Darby. If you have any questions after today's visit, please call [phone].

## 2018-08-08 NOTE — PATIENT INSTRUCTIONS
Dear Silvestre Gonzales ,    It was a pleasure seeing you in the Palliative Medicine Office today. This is what we talked about:     1. Your abdominal pain  -You've had no pain since you've been taking the morphine  -Continue morphine long-acting 15-mg every 8 hours  -You are not using any short-acting opioid pain medicines at this time  -Edson Pineda very occasionally gives you a baby aspirin when you have other types of pain like a headache    2. Your bowels  -You've been having regular bowel movements taking senna 2  tabs twice a day  -You occasionally take dulcolax if you go more than 2 days without a bowel movement    3. Your fatigue  -You energy is good! 4. Your appetite  -Your appetite is good!  -You enjoy several snacks a day  -You are concerned about your weight gain  -I reviewed your weights and your medical history  -I'm not concerned about your weight  -I encourage you to continue to enjoy your meals and your snacks! 5. The blood in your urine  -Your aide noticed bright red blood in your urine for a few days  -You didn't have any pain or discomfort with this  -This hasn't occurred again  -You and Edson Pineda are watching out for it and willcall to make an appointment with your PCP if that happens  -Otherwise, you will see your PCP for a routine check-up in September 6. Your function(we talked about this at length during your last visit)  -You walk with a cane or a walker to help prevent falls.  You haven't had any falls since your last visit here  -You have a wheelchair you can use when you go outside of your home  -You feed yourself and help Edson Pineda clean up after dinner by drying the dishes  -You give yourself sponge baths every day with your aide or Edson Pineda close by in case you need any help  -You are able to get to the bathroom when you need to go and you don't have any accidents  -Edson Pineda helps to organize your medications for you  -You have an aide in your home to be with you when Edson Pineda is at work  -Cristo Mora and Edson Pineda continue to go out twice a week for chores and for fun    This is what you have shared with us about Mary Sheth. Planning 4/11/2018   Patient's Healthcare Decision Maker is: Named in scanned ACP document   Primary Decision Maker Name Balbina Crawford   Primary Decision Maker Phone Number [phone]   Primary Decision Maker Relationship to Patient Other relative   Confirm Advance Directive Yes, on file   Does the patient have other document types Do Not Resuscitate         The Palliative Medicine Team is here to support you and your family. We will see you again in 8 weeks. Our Nurse Navigator Guzman Sahu will check in with you by phone before that time. If there are any concerns before that time, such as medication questions, worsening symptoms or a need to see a physician for an urgent or emergent situation; please call [phone] (OYGR). A physician is also on call after our normal business hours of 8am to 5pm.     In order to serve you better, please allow up to 48 hours for prescription refills to be processed. Certain medications may require more paperwork or a written prescription that you may need to  from the office. We appreciate you letting us know of any refill requests as soon as possible. We also would like you to sign up for [employer] as well.     Sincerely,      Claire Khan MD and the Palliative Medicine Team

## 2018-08-08 NOTE — PROGRESS NOTES
Palliative Medicine Outpatient Services  [address] [place] [address]: [phone] ([phone])    Patient Name: George Scruggs  YOB: 1923    Date of Current Visit: 08/08/18  Location of Current Visit:    [x] [facility] Office  [] [facility] Office  [] [facility] Office  [] Home  [] Other:      Date of Initial Visit: 2/28/18   Requesting Physician: Carter Montana PA-C  Primary Care Physician: Flavia Connolly PA-C      SUMMARY:   George Scruggs is a 80y.o. year old with a past history of pancreatic mass by CT suspicious for malignancy, who was referred to Palliative Medicine by Ms. Vipul Lowry for symptom management and supportive care. She was hospitalized in [place] in 7/2017 with decreased appetite, functional decline and abdominal pain. 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 She declined further work-up (no biopsy). She was given a presumptive diagnosis of pancreatic cancer. She was discharged to live with er closest living relative, 2nd cousin/POA, Estefanía Small, and was followed by [facility] until recently when she was discharged from hospice for failure to decline. She's thrived since hospital discharge, maintaining her functional abilities and with a 25# weight gain. The patients social history includes: she is . She has no living siblings. She lives in [address] [place] [address] with her second cousin, Estefanía Small, and her beloved Ashley Tellez. Palliative Medicine is addressing the following current patient/family concerns: abdominal pain, mild fatigue, constipation. PALLIATIVE DIAGNOSES:       ICD-10-CM ICD-9-CM    1. Abdominal pain, generalized R10.84 789.07 morphine CR (MS CONTIN) 15 mg CR tablet      DISCONTINUED: morphine CR (MS CONTIN) 15 mg CR tablet   2. Drug-induced constipation K59.03 564.09      E980.5    3. Fatigue, unspecified type R53.83 780.79    4. Hematuria of undiagnosed cause R31.9 599.70    5.  Memory difficulties R41.3 780.93 6. Pancreatic mass K86.9 577.9           PLAN:   Patient Instructions     Dear Mario Baltazar ,    It was a pleasure seeing you in the Palliative Medicine Office today. This is what we talked about:     1. Your abdominal pain  -You've had no pain since you've been taking the morphine  -Continue morphine long-acting 15-mg every 8 hours  -You are not using any short-acting opioid pain medicines at this time  -Yris Disla very occasionally gives you a baby aspirin when you have other types of pain like a headache    2. Your bowels  -You've been having regular bowel movements taking senna 2  tabs twice a day  -You occasionally take dulcolax if you go more than 2 days without a bowel movement    3. Your fatigue  -You energy is good! 4. Your appetite  -Your appetite is good!  -You enjoy several snacks a day  -You are concerned about your weight gain  -I reviewed your weights and your medical history  -I'm not concerned about your weight  -I encourage you to continue to enjoy your meals and your snacks! 5. The blood in your urine  -Your aide noticed bright red blood in your urine for a few days  -You didn't have any pain or discomfort with this  -This hasn't occurred again  -You and Yris Disla are watching out for it and willcall to make an appointment with your PCP if that happens  -Otherwise, you will see your PCP for a routine check-up in September 6. Your function(we talked about this at length during your last visit)  -You walk with a cane or a walker to help prevent falls.  You haven't had any falls since your last visit here  -You have a wheelchair you can use when you go outside of your home  -You feed yourself and help Yris Fanning clean up after dinner by drying the dishes  -You give yourself sponge baths every day with your aide or Yris Thompsonning close by in case you need any help  -You are able to get to the bathroom when you need to go and you don't have any accidents  -Yris Fanning helps to organize your medications for you  -You have an aide in your home to be with you when Alysia Andrea is at work  -You and Alysia Andrea continue to go out twice a week for chores and for fun    This is what you have shared with us about Mary Sheth. Planning 4/11/2018   Patient's Juanito 8 is: Named in scanned ACP document   Primary Decision Maker Name Cathleen Miller   Primary Decision Maker Phone Number [phone]   Primary Decision Maker Relationship to Patient Other relative   Confirm Advance Directive Yes, on file   Does the patient have other document types Do Not Resuscitate         The Palliative Medicine Team is here to support you and your family. We will see you again in 8 weeks. Our Nurse Navigator Ximena Carrasco will check in with you by phone before that time. If there are any concerns before that time, such as medication questions, worsening symptoms or a need to see a physician for an urgent or emergent situation; please call [phone] (COPE). A physician is also on call after our normal business hours of 8am to 5pm.     In order to serve you better, please allow up to 48 hours for prescription refills to be processed. Certain medications may require more paperwork or a written prescription that you may need to  from the office. We appreciate you letting us know of any refill requests as soon as possible. We also would like you to sign up for [employer] as well.     Sincerely,      Shine Markham MD and the Palliative Medicine Team      Counseling and Coordination: note routed to Estuardo LIAO        GOALS OF CARE / TREATMENT PREFERENCES:   [====Goals of Care====]  GOALS OF CARE:  Patient / health care proxy stated goals: maintenance of quality of life      TREATMENT PREFERENCES:   Code Status:  [] Attempt Resuscitation       [x] Do Not Attempt Resuscitation    Advance Care Planning:  Advance Care Planning 4/11/2018   Patient's Healthcare Decision Maker is: Named in scanned ACP document   Primary Decision Maker Name Austin Trevizo   Primary Decision Maker Phone Number [phone]   Primary Decision Maker Relationship to Patient Other relative   Confirm Advance Directive Yes, on file   Does the patient have other document types Do Not Resuscitate       Other:  (If patient appropriate for POST, consider using PALLPOST smart phrase here)    The palliative care team has discussed with patient / health care proxy about goals of care / treatment preferences for patient.  [====Goals of Care====]         PRESCRIPTIONS GIVEN:     Medications Ordered Today   Medications    DISCONTD: morphine CR (MS CONTIN) 15 mg CR tablet     Sig: Take 1 Tab by mouth every eight (8) hours. Max Daily Amount: 45 mg. Dispense:  90 Tab     Refill:  0    morphine CR (MS CONTIN) 15 mg CR tablet     Sig: Take 1 Tab by mouth every eight (8) hours. Max Daily Amount: 45 mg. Dispense:  90 Tab     Refill:  0           FOLLOW UP:     Future Appointments  Date Time Provider Getachew Lili   8/8/2018 10:30 AM Dontae Mendez MD [facility] 954 INVOLVED IN CARE:   Patient Care Team:  Shannan Gonzales PA-C as PCP - General (Physician Assistant)  Dontae Mendez MD as Physician (Palliative Medicine)       HISTORY:   Nursing documentation from date of visit reviewed. Reviewed patient-completed ESAS and advance care planning form. Reviewed patient record in prescription monitoring program.    CHIEF COMPLAINT: abdominal pain    HPI/SUBJECTIVE:    The patient is: [x] Verbal / [] Nonverbal (most history obtained from primary caregiver, Jethro Hansen, due to memory difficulties)    She is feeling well today. She's not having any pain anywhere. Jethro Hansen gives her medicine which helps so she doesn't have pain. She's moving her bowels every day. Her aide noticed blood in her urine for a few day. She wasn't having any pain or discomfort.  Jethro Hansen and the aide watched it and it went away after a few days and hasn't happened again.    Her appetite is good! She can't believe how much she weighed when she checked in. This bothers her. She wonders if she should cut back on her snacks. She continues to out of bed every day. She reads the newspaper. She loves to read and she's reading a mystery book now. Rosa (her dog) snuggles up next to her in her chair when she reads. She rivero Rosa every day. She still helps at dinner time by setting the table at dinner time. She dries and puts the dishes away. She uses a four-pronged cane to walk around the house and her wheelchair when she and Noah Santiago go out. She continues to dress herself and give herself sponge baths. Noah Santiago helps her with a tub bath several times a week. She goes to the bathroom by herself. She's going out for a manicure today. She and Noah Santiago are going out to eat dinner tomorrow night. She's going to make an appointment to see her PCP in September for a routine visit. Clinical Pain Assessment (nonverbal scale for nonverbal patients):   [++++ Clinical Pain Assessment++++]  [++++Pain Severity++++]: Pain: 0  [++++Pain Character++++]:  [++++Pain Duration++++]:  [++++Pain Effect++++]:  [++++Pain Factors++++]:  [++++Pain Frequency++++]:  [++++Pain Location++++]:  [++++ Clinical Pain Assessment++++]       FUNCTIONAL ASSESSMENT:     Palliative Performance Scale (PPS):  PPS: 70       PSYCHOSOCIAL/SPIRITUAL SCREENING:     Any spiritual / Methodist concerns:  [] Yes /  [x] No    Caregiver Burnout:  [] Yes /  [x] No /  [] No Caregiver Present      Anticipatory grief assessment:   [] Normal  / [] Maladaptive       ESAS Anxiety: Anxiety: 0    ESAS Depression: Depression: 0       REVIEW OF SYSTEMS:     The following systems were [x] reviewed / [] unable to be reviewed  Systems: constitutional, ears/nose/mouth/throat, respiratory, gastrointestinal, genitourinary, musculoskeletal, integumentary, neurologic, psychiatric, endocrine. Positive findings noted below.   Modified ESAS Completed by: provider   Fatigue: 0 Drowsiness: 0   Depression: 0 Pain: 0   Anxiety: 0 Nausea: 0   Anorexia: 0 Dyspnea: 0   Best Well-Bein Constipation: No   Other Problem (Comment): 0          PHYSICAL EXAM:     Wt Readings from Last 3 Encounters:   18 160 lb 12.8 oz (72.9 kg)   18 156 lb 6.4 oz (70.9 kg)   18 158 lb (71.7 kg)     Blood pressure 126/50, pulse 74, temperature 98.3 °F (36.8 °C), temperature source Oral, resp. rate 16, height 5' 2\" (1.575 m), weight 160 lb 12.8 oz (72.9 kg), SpO2 91 %. Last bowel movement: See Nursing Note    Constitutional: sitting in wheelchair, appears relaxed  Eyes: pupils equal, anicteric  ENMT: no nasal discharge, moist mucous membranes; HARD OF HEARING  Cardiovascular: regular, rate and rhythm; I/VI MANUEL RUSB  Respiratory: breathing not labored, symmetric  Gastrointestinal: soft non-tender, +bowel sounds  Musculoskeletal: no deformity, no tenderness to palpation; trace bilateral symmetric lower extremity edema  Skin: warm, dry  Neurologic: alert, knows name and can give most history with confusion about details, following commands, moving all extremities  Psychiatric: full affect, no hallucinations  Other:       HISTORY:     Past Medical History:   Diagnosis Date    Cancer ([facility] [address]     Pancreatic    Dry eyes     Hypertension, essential     Migraine       No past surgical history on file. No family history on file. History reviewed, no pertinent family history. Social History   Substance Use Topics    Smoking status: Never Smoker    Smokeless tobacco: Never Used    Alcohol use No     No Known Allergies   Current Outpatient Prescriptions   Medication Sig    morphine CR (MS CONTIN) 15 mg CR tablet Take 1 Tab by mouth every eight (8) hours. Max Daily Amount: 45 mg.    aspirin delayed-release 81 mg tablet Take  by mouth daily as needed for Pain.  senna-docusate (SENNA PLUS) 8.6-50 mg per tablet Take 2 Tabs by mouth two (2) times a day.     DULCOLAX, BISACODYL, PO Take 0.5 Caps by mouth daily as needed. No current facility-administered medications for this visit.            LAB DATA REVIEWED:     No results found for: WBC, HGB, PLT, HGBEXT, PLTEXT, HGBEXT, PLTEXT  No results found for: NA, K, CL, CO2, BUN, CREA, CA, MG, PHOS   No results found for: SGOT, GPT, AP, TBIL, TP, ALB, GLOB, GGT  No results found for: INR, PTMR, PTP, PT1, PT2, APTT   No results found for: IRON, FE, TIBC, IBCT, PSAT, FERR        CONTROLLED SUBSTANCES SAFETY ASSESSMENT (IF ON CONTROLLED SUBSTANCES):     Reviewed opioid safety handout:  [x] Yes   [] No  24 hour opioid dose >150mg morphine equivalent/day:  [] Yes   [x] No  Benzodiazepines:  [] Yes   [x] No  Sleep apnea:  [] Yes   [x] No  Urine Toxicology Testing within last 6 months:  [] Yes   [] No  History of or new aberrant medication taking behaviors:  [] Yes   [] No            Total time:   Counseling / coordination time:   > 50% counseling / coordination?:

## 2018-09-19 ENCOUNTER — OFFICE VISIT (OUTPATIENT)
Dept: INTERNAL MEDICINE CLINIC | Age: 83
End: 2018-09-19

## 2018-09-19 VITALS
TEMPERATURE: 98.8 F | HEART RATE: 77 BPM | RESPIRATION RATE: 18 BRPM | OXYGEN SATURATION: 93 % | SYSTOLIC BLOOD PRESSURE: 118 MMHG | HEIGHT: 62 IN | DIASTOLIC BLOOD PRESSURE: 58 MMHG

## 2018-09-19 DIAGNOSIS — E66.3 OVERWEIGHT (BMI 25.0-29.9): ICD-10-CM

## 2018-09-19 DIAGNOSIS — C25.9 MALIGNANT NEOPLASM OF PANCREAS, UNSPECIFIED LOCATION OF MALIGNANCY (HCC): ICD-10-CM

## 2018-09-19 DIAGNOSIS — Z00.00 WELLNESS EXAMINATION: Primary | ICD-10-CM

## 2018-09-19 DIAGNOSIS — Z71.89 ACP (ADVANCE CARE PLANNING): ICD-10-CM

## 2018-09-19 DIAGNOSIS — Z23 ENCOUNTER FOR IMMUNIZATION: ICD-10-CM

## 2018-09-19 DIAGNOSIS — Z66 DNR (DO NOT RESUSCITATE): ICD-10-CM

## 2018-09-19 DIAGNOSIS — H91.90 HEARING LOSS, UNSPECIFIED HEARING LOSS TYPE, UNSPECIFIED LATERALITY: ICD-10-CM

## 2018-09-19 NOTE — PROGRESS NOTES
1. Have you been to the ER, urgent care clinic since your last visit? Hospitalized since your last visit? No    2. Have you seen or consulted any other health care providers outside of the [facility] since your last visit? Include any pap smears or colon screening. Yes  Chief Complaint   Patient presents with    Hypertension     follow up     Not fasting  Abuse Screening Questionnaire 9/19/2018   Do you ever feel afraid of your partner? N   Are you in a relationship with someone who physically or mentally threatens you? N   Is it safe for you to go home? Y     PHQ over the last two weeks 9/19/2018   Little interest or pleasure in doing things Not at all   Feeling down, depressed, irritable, or hopeless Not at all   Total Score PHQ 2 0       Fall Risk Assessment, last 12 mths 9/19/2018   Able to walk? Yes   Fall in past 12 months? No   Fall with injury?  -

## 2018-09-19 NOTE — PROGRESS NOTES
HISTORY OF PRESENT ILLNESS  Sheela Maxwell is a 80 y.o. female. Chief Complaint   Patient presents with    Hypertension     follow up     Health Maintenance Due   Topic Date Due    DTaP/Tdap/Td series (1 - Tdap) 06/27/1944    ZOSTER VACCINE AGE 60>  04/27/1983    GLAUCOMA SCREENING Q2Y  06/27/1988    Bone Densitometry (Dexa) Screening  06/27/1988    Pneumococcal 65+ High/Highest Risk (1 of 2 - PCV13) 06/27/1988    MEDICARE YEARLY EXAM  03/14/2018    Influenza Age 9 to Adult  08/01/2018     HPI  HTN -   BP slightly uncontrolled at first check:   BP Readings from Last 1 Encounters:   09/19/18 140/59     Controlled at recheck:  BP Readings from Last 3 Encounters:   09/19/18 118/58   08/08/18 126/50   06/06/18 129/54     Seeing foot doctor regularly    Wt Readings from Last 3 Encounters:   08/08/18 160 lb 12.8 oz (72.9 kg)   06/06/18 156 lb 6.4 oz (70.9 kg)   04/11/18 158 lb (71.7 kg)   Weight is stable. Not currently exercising, but planning to start using light weights at home    Walking with walker. Going out 1-2x/week. Declines flu shot, DEXA, & pneumovax. This is the Subsequent Medicare Annual Wellness Exam, performed 12 months or more after the Initial AWV or the last Subsequent AWV    I have reviewed the patient's medical history in detail and updated the computerized patient record. History     Past Medical History:   Diagnosis Date    Cancer [facility])     Pancreatic    Dry eyes     Hypertension, essential     Migraine       History reviewed. No pertinent surgical history. Current Outpatient Prescriptions   Medication Sig Dispense Refill    morphine CR (MS CONTIN) 15 mg CR tablet Take 1 Tab by mouth every eight (8) hours. Max Daily Amount: 45 mg. 90 Tab 0    aspirin delayed-release 81 mg tablet Take  by mouth daily as needed for Pain.  senna-docusate (SENNA PLUS) 8.6-50 mg per tablet Take 2 Tabs by mouth two (2) times a day.       DULCOLAX, BISACODYL, PO Take 0.5 Caps by mouth daily as needed. No Known Allergies  History reviewed. No pertinent family history. Social History   Substance Use Topics    Smoking status: Never Smoker    Smokeless tobacco: Never Used    Alcohol use No     Patient Active Problem List   Diagnosis Code    Migraine G43.909    Dry eyes H04.123    Malignant neoplasm of pancreas ([facility] [address] C25.9       Depression Risk Factor Screening:     PHQ over the last two weeks 9/19/2018   Little interest or pleasure in doing things Not at all   Feeling down, depressed, irritable, or hopeless Not at all   Total Score PHQ 2 0     Alcohol Risk Factor Screening: You do not drink alcohol or very rarely. Functional Ability and Level of Safety:   Hearing Loss  The patient needs further evaluation. Activities of Daily Living  The home contains: handrails  Patient needs help with:  transportation, shopping, preparing meals, laundry, housework, managing medications, managing money, bathing, hygiene and walking    Fall Risk  Fall Risk Assessment, last 12 mths 9/19/2018   Able to walk? Yes   Fall in past 12 months? No   Fall with injury?  -     Abuse Screen  Patient is not abused    Cognitive Screening   Evaluation of Cognitive Function:  Has your family/caregiver stated any concerns about your memory: no  Normal    Patient Care Team   Patient Care Team:  Flavia Connolly PA-C as PCP - General (Physician Assistant)  Landen Stauffer MD as Physician (Palliative Medicine)    Assessment/Plan   Education and counseling provided:  Are appropriate based on today's review and evaluation  End-of-Life planning (with patient's consent)    Health Maintenance Due   Topic Date Due    DTaP/Tdap/Td series (1 - Tdap) 06/27/1944    ZOSTER VACCINE AGE 60>  04/27/1983    GLAUCOMA SCREENING Q2Y  06/27/1988    Bone Densitometry (Dexa) Screening  06/27/1988    Pneumococcal 65+ High/Highest Risk (1 of 2 - PCV13) 06/27/1988    MEDICARE YEARLY EXAM  03/14/2018    Influenza Age 5 to Adult 08/01/2018     Advance Care Planning (ACP) Provider Note - Comprehensive     Date of ACP Conversation: 09/19/18  Persons included in Conversation:  patient and POA  Length of ACP Conversation in minutes:  <16 minutes (Non-Billable)    Authorized Decision Maker (if patient is incapable of making informed decisions): This person is:  Healthcare Agent/Medical Power of  under Advance Directive  Suzanna Nj (2nd cousin) - [phone]        General ACP for ALL Patients with Decision Making Capacity:   Importance of advance care planning, including choosing a healthcare agent to communicate patient's healthcare decisions if patient lost the ability to make decisions, such as after a sudden illness or accident  Understanding of the healthcare agent role was assessed and information provided    Review of Existing Advance Directive:  Does this advance directive still reflect your preferences? Yes (Provide new form/Refer for assistance in updating)    For Serious or Chronic Illness:  Understanding of medical condition      Interventions Provided:  Recommended review of completed ACP document annually or upon change in health status    Review of Systems   Constitutional: Negative for fever and malaise/fatigue. HENT: Positive for hearing loss. Negative for ear pain. Respiratory: Negative for shortness of breath. Cardiovascular: Negative for chest pain and palpitations. Musculoskeletal: Negative for falls. Neurological: Negative for dizziness, loss of consciousness and weakness. Psychiatric/Behavioral: Negative for depression and substance abuse. The patient is not nervous/anxious. Physical Exam   Constitutional: She is oriented to person, place, and time. She appears well-developed and well-nourished. No distress. Brian Albino [employer] [facility]) contributes to history as pt struggles with hearing. HENT:   Head: Normocephalic and atraumatic. Neck: Neck supple. No JVD present.    Cardiovascular: Normal rate, regular rhythm and normal heart sounds. Pulmonary/Chest: Effort normal and breath sounds normal. No respiratory distress. Musculoskeletal: She exhibits no edema. Neurological: She is alert and oriented to person, place, and time. Skin: Skin is warm and dry. Psychiatric: She has a normal mood and affect. Her behavior is normal. Judgment and thought content normal.   Nursing note and vitals reviewed. ASSESSMENT and PLAN  Diagnoses and all orders for this visit:    1. Wellness examination - doing very well! See above    2. Hearing loss, unspecified hearing loss type, unspecified laterality  -     REFERRAL TO AUDIOLOGY    3. Overweight (BMI 25.0-29.9) - discussed light routine exercises to do while seated at home. Major weight loss attempt discouraged, but maintaining strength encouraged. 4. Encounter for immunization  -     varicella-zoster recombinant, PF, (SHINGRIX, PF,) 50 mcg/0.5 mL susr injection; 0.5 mL by IntraMUSCular route once for 1 dose. Please administer and fax record. Thanks! 5.  Malignant neoplasm of pancreas, unspecified location of malignancy ([facility] [address]  - continue palliative medicine follow ups

## 2018-09-19 NOTE — MR AVS SNAPSHOT
[address] [place] [address] [address] [place] [address] 
[phone] Patient: Cam Crum MRN: [id number] Visit Information Date & Time Provider Department Dept. Phone Encounter #  
 2018  9:00 AM Michael Infante PA-C [facility] [phone] [id number] Your Appointments 10/3/2018 10:30 AM  
Follow Up with Tayler Oliver MD  
[facility] ([address] [facility]) Appt Note: f/u cp0.00, pb0.00, cnc 18 [address] [facility] [address] [address]  
[phone]  
  
   
 [address] [address] Upcoming Health Maintenance Date Due DTaP/Tdap/Td series (1 - Tdap) 1944 ZOSTER VACCINE AGE 60> 1983 GLAUCOMA SCREENING Q2Y 1988 Bone Densitometry (Dexa) Screening 1988 Pneumococcal 65+ High/Highest Risk (1 of 2 - PCV13) 1988 MEDICARE YEARLY EXAM 3/14/2018 Influenza Age 5 to Adult 2018 Allergies as of 2018  Review Complete On: 2018 By: Marquez Ibrahim No Known Allergies Current Immunizations  Never Reviewed No immunizations on file. Not reviewed this visit You Were Diagnosed With   
  
 Codes Comments Wellness examination    -  Primary ICD-10-CM: Z00.00 ICD-9-CM: V70.0 Hearing loss, unspecified hearing loss type, unspecified laterality     ICD-10-CM: H91.90 ICD-9-CM: 389.9 Overweight (BMI 25.0-29. 9)     ICD-10-CM: Y75.4 ICD-9-CM: 278.02 Encounter for immunization     ICD-10-CM: D33 ICD-9-CM: V03.89 Malignant neoplasm of pancreas, unspecified location of malignancy ([facility] [address]     ICD-10-CM: C25.9 ICD-9-CM: 157.9 Vitals BP Pulse Temp Resp Height(growth percentile) SpO2  
 118/58 77 98.8 °F (37.1 °C) (Oral) 18 5' 2\" (1.575 m) 93% OB Status Smoking Status Postmenopausal Never Smoker Vitals History Preferred Pharmacy Pharmacy Name Phone [facility] [address] [address] [address] [phone] Your Updated Medication List  
  
   
This list is accurate as of 18  9:50 AM.  Always use your most recent med list.  
  
  
  
  
 aspirin delayed-release 81 mg tablet Take  by mouth daily as needed for Pain. DULCOLAX (BISACODYL) PO Take 0.5 Caps by mouth daily as needed. morphine CR 15 mg CR tablet Commonly known as:  MS CONTIN Take 1 Tab by mouth every eight (8) hours. Max Daily Amount: 45 mg. SENNA PLUS 8.6-50 mg per tablet Generic drug:  senna-docusate Take 2 Tabs by mouth two (2) times a day. varicella-zoster recombinant (PF) 50 mcg/0.5 mL Susr injection Commonly known as:  SHINGRIX (PF)  
0.5 mL by IntraMUSCular route once for 1 dose. Please administer and fax record. Thanks! Prescriptions Printed Refills  
 varicella-zoster recombinant, PF, (SHINGRIX, PF,) 50 mcg/0.5 mL susr injection 0 Si.5 mL by IntraMUSCular route once for 1 dose. Please administer and fax record. Thanks! Class: Print Route: IntraMUSCular We Performed the Following REFERRAL TO AUDIOLOGY [REF7 Custom] Comments:  
 [facility] Address: [address] [facility] [address] [place], [address], [address] Phone: [phone] [facility] [address] [place] [address] [address], [address] 
[phone] Introducing [facility] [place] [facility]! Belinda Soto introduces [employer] patient portal. Now you can access parts of your medical record, email your doctor's office, and request medication refills online. 1. In your internet browser, go to [email or web address] 2. Click on the First Time User? Click Here link in the Sign In box. You will see the New Member Sign Up page. 3. Enter your [employer] Access Code exactly as it appears below.  You will not need to use this code after youve completed the sign-up process. If you do not sign up before the expiration date, you must request a new code. · [employer] Access Code: [email or web address] Expires: 12/18/2018  9:49 AM 
 
4. Enter the last four digits of your Social Security Number (xxxx) and Date of Birth (mm/dd/yyyy) as indicated and click Submit. You will be taken to the next sign-up page. 5. Create a [employer] ID. This will be your [employer] login ID and cannot be changed, so think of one that is secure and easy to remember. 6. Create a [employer] password. You can change your password at any time. 7. Enter your Password Reset Question and Answer. This can be used at a later time if you forget your password. 8. Enter your e-mail address. You will receive e-mail notification when new information is available in [address] 9. Click Sign Up. You can now view and download portions of your medical record. 10. Click the Download Summary menu link to download a portable copy of your medical information. If you have questions, please visit the Frequently Asked Questions section of the [employer] website. Remember, [employer] is NOT to be used for urgent needs. For medical emergencies, dial 911. Now available from your iPhone and Android! Please provide this summary of care documentation to your next provider. Your primary care clinician is listed as Radha Arango. If you have any questions after today's visit, please call [phone].

## 2018-10-03 ENCOUNTER — OFFICE VISIT (OUTPATIENT)
Dept: PALLATIVE CARE | Age: 83
End: 2018-10-03

## 2018-10-03 VITALS
OXYGEN SATURATION: 97 % | DIASTOLIC BLOOD PRESSURE: 62 MMHG | HEART RATE: 95 BPM | WEIGHT: 164.5 LBS | HEIGHT: 62 IN | SYSTOLIC BLOOD PRESSURE: 150 MMHG | RESPIRATION RATE: 16 BRPM | BODY MASS INDEX: 30.27 KG/M2 | TEMPERATURE: 96.7 F

## 2018-10-03 DIAGNOSIS — K59.03 DRUG-INDUCED CONSTIPATION: ICD-10-CM

## 2018-10-03 DIAGNOSIS — K86.89 PANCREATIC MASS: ICD-10-CM

## 2018-10-03 DIAGNOSIS — R53.83 FATIGUE, UNSPECIFIED TYPE: ICD-10-CM

## 2018-10-03 DIAGNOSIS — R41.3 MEMORY DIFFICULTIES: ICD-10-CM

## 2018-10-03 DIAGNOSIS — R10.84 ABDOMINAL PAIN, GENERALIZED: Primary | ICD-10-CM

## 2018-10-03 RX ORDER — MORPHINE SULFATE 15 MG/1
15 TABLET, FILM COATED, EXTENDED RELEASE ORAL EVERY 8 HOURS
Qty: 90 TAB | Refills: 0 | Status: SHIPPED | OUTPATIENT
Start: 2018-11-03 | End: 2018-10-03 | Stop reason: SDUPTHER

## 2018-10-03 RX ORDER — MORPHINE SULFATE 15 MG/1
15 TABLET, FILM COATED, EXTENDED RELEASE ORAL EVERY 8 HOURS
Qty: 90 TAB | Refills: 0 | Status: SHIPPED | OUTPATIENT
Start: 2018-12-03 | End: 2019-01-02 | Stop reason: SDUPTHER

## 2018-10-03 RX ORDER — MORPHINE SULFATE 15 MG/1
15 TABLET, FILM COATED, EXTENDED RELEASE ORAL EVERY 8 HOURS
Qty: 90 TAB | Refills: 0 | Status: SHIPPED | OUTPATIENT
Start: 2018-10-03 | End: 2018-10-03 | Stop reason: SDUPTHER

## 2018-10-03 NOTE — PROGRESS NOTES
Palliative Medicine Outpatient Services  [place]: [phone] ([phone])    Patient Name: Nick Perez  YOB: 1923    Date of Current Visit: 10/03/18  Location of Current Visit:    [x] [facility] Office  [] [facility] Office  [] [facility] Office  [] Home  [] Other:      Date of Initial Visit: 2/28/18   Requesting Physician: Mika Waldrop PA-C  Primary Care Physician: Grace Gauthier PA-C      SUMMARY:   Nick Perez is a 80y.o. year old with a past history of pancreatic mass by CT suspicious for malignancy, who was referred to Palliative Medicine by Ms. Shonna Paredes for symptom management and supportive care. She was hospitalized in [place] in 7/2017 with decreased appetite, functional decline and abdominal pain. 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 She declined further work-up (no biopsy). She was given a presumptive diagnosis of pancreatic cancer. She was discharged to live with er closest living relative, 2nd cousin/POA, Giancarlo Barrow, and was followed by [facility] until recently when she was discharged from hospice for failure to decline. She's thrived since hospital discharge, maintaining her functional abilities and with a 25# weight gain. The patients social history includes: she is . She has no living siblings. She lives in [place] with her second cousin, Giancarlo Barrow, and her beloved Vasu Cooler. Palliative Medicine is addressing the following current patient/family concerns: abdominal pain, mild fatigue, constipation. PALLIATIVE DIAGNOSES:       ICD-10-CM ICD-9-CM    1. Abdominal pain, generalized R10.84 789.07 morphine CR (MS CONTIN) 15 mg CR tablet      DISCONTINUED: morphine CR (MS CONTIN) 15 mg CR tablet      DISCONTINUED: morphine CR (MS CONTIN) 15 mg CR tablet   2. Drug-induced constipation K59.03 564.09      E980.5    3. Fatigue, unspecified type R53.83 780.79    4.  Memory difficulties R41.3 780.93    5. Pancreatic mass K86.9 577.9           PLAN:   Patient Instructions     Dear Madhavi Deleon ,    It was a pleasure seeing you in the Palliative Medicine Office today. This is what we talked about:     1. Your abdominal pain  -Your pain is under good control on your current pain medication regimen  -Continue long-acting morphine 15-mg every 8 hours  -You've been taking this for over a year and it's working well for you without any side-effects  -You are not using any short-acting opioid pain medicines at this time  -Irving Soto very occasionally gives you a baby aspirin when you have other types of pain like a headache    2. Your bowels  -You've been having regular bowel movements without need for laxatives or stool softener  -Continue senna or dulcolax as needed    3. Your fatigue  -You have occasional mild fatigue  -You and Irving Charles continue to enjoy several outings a week    4. Your appetite  -Your appetite is good!  -You enjoy several snacks a day  -You are concerned about your weight gain  -I reviewed your weights and your medical history  -I'm not concerned about your weight  -I encourage you to continue to enjoy your meals and your snacks! 5. Your function   -You walk with a cane or a walker to help prevent falls.  You haven't had any falls since your last visit here  -You have a wheelchair you can use when you go outside of your home  -You feed yourself and help Irving Soto clean up after dinner by drying the dishes  -You give yourself sponge baths every day with your aide or Irving Soto close by in case you need any help  -You are able to get to the bathroom when you need to go and you don't have any accidents  -Irving Huertason helps to organize your medications for you  -You have an aide in your home to be with you when Irvingpreethi Soto is at work  -You and Irving Soto continue to go out twice a week for chores and for fun    This is what you have shared with us about Mary Schmid 79. Planning 4/11/2018   Patient's Healthcare Decision Maker is: Named in scanned ACP document   Primary Decision Maker Name Van Yung   Primary Decision Maker Phone Number [phone]   Primary Decision Maker Relationship to Patient Other relative   Confirm Advance Directive Yes, on file   Does the patient have other document types Do Not Resuscitate         The Palliative Medicine Team is here to support you and your family. We will see you again in 12 weeks. Our Nurse Navigator Andrade No will check in with you by phone before that time. If there are any concerns before that time, such as medication questions, worsening symptoms or a need to see a physician for an urgent or emergent situation; please call [phone] (COPE). A physician is also on call after our normal business hours of 8am to 5pm.     In order to serve you better, please allow up to 48 hours for prescription refills to be processed. Certain medications may require more paperwork or a written prescription that you may need to  from the office. We appreciate you letting us know of any refill requests as soon as possible. We also would like you to sign up for [employer] as well.     Sincerely,      Joseph Mora MD and the Palliative Medicine Team      Counseling and Coordination: note routed to [address] / TREATMENT PREFERENCES:   [====Goals of Care====]  GOALS OF CARE:  Patient / health care proxy stated goals: maintenance of quality of life      TREATMENT PREFERENCES:   Code Status:  [] Attempt Resuscitation       [x] Do Not Attempt Resuscitation    Advance Care Planning:  Advance Care Planning 4/11/2018   Patient's Healthcare Decision Maker is: Named in scanned ACP document   Primary Decision Maker Name Van Yung   Primary Decision Maker Phone Number [phone]   Primary Decision Maker Relationship to Patient Other relative   Confirm Advance Directive Yes, on file   Does the patient have other document types Do Not Resuscitate Other:  (If patient appropriate for POST, consider using PALLPOST smart phrase here)    The palliative care team has discussed with patient / health care proxy about goals of care / treatment preferences for patient.  [====Goals of Care====]         PRESCRIPTIONS GIVEN:     Medications Ordered Today   Medications    DISCONTD: morphine CR (MS CONTIN) 15 mg CR tablet     Sig: Take 1 Tab by mouth every eight (8) hours. Max Daily Amount: 45 mg. Dispense:  90 Tab     Refill:  0    DISCONTD: morphine CR (MS CONTIN) 15 mg CR tablet     Sig: Take 1 Tab by mouth every eight (8) hours. Max Daily Amount: 45 mg. Dispense:  90 Tab     Refill:  0    morphine CR (MS CONTIN) 15 mg CR tablet     Sig: Take 1 Tab by mouth every eight (8) hours. Max Daily Amount: 45 mg. Dispense:  90 Tab     Refill:  0           FOLLOW UP:     No future appointments. PHYSICIANS INVOLVED IN CARE:   Patient Care Team:  Grace Gauthier PA-C as PCP - General (Physician Assistant)  Reshma Willis MD as Physician (Palliative Medicine)       HISTORY:   Nursing documentation from date of visit reviewed. Reviewed patient-completed ESAS and advance care planning form. Reviewed patient record in prescription monitoring program.    CHIEF COMPLAINT: abdominal pain    HPI/SUBJECTIVE:    The patient is: [x] Verbal / [] Nonverbal (most history obtained from primary caregiver, Kings Osman, due to memory difficulties)    She's been doing well. She never has pain in her abdomen or really anywhere else. Kings Osman gives her pain medicine 3 times a day. She has a bowel movement every day, usually around the same time. Kings Osman rarely has to give her a laxative or a stool softener. She sleeps well at night. She gets out of bed and stays up all day. She makes her bed and spends a lot of her time reading. She and Kings Osman go out several times a week. They went out shopping last Saturday and went out to dinner last night. She likes to eat.  She's still worried about her weight, though. She saw Joseph LIAO last month and got a good report. She decided not to get the flu, shingles or pneumonia shots. She's been doing some deep breathing exercises every morning with Arley Budkarrie. She's going to stay using light weights to keep her arms strong. She's going for a hearing test this Friday and will think about whether to get hearing aids. Clinical Pain Assessment (nonverbal scale for nonverbal patients):   [++++ Clinical Pain Assessment++++]  [++++Pain Severity++++]: Pain: 0  [++++Pain Character++++]:  [++++Pain Duration++++]:  [++++Pain Effect++++]:  [++++Pain Factors++++]:  [++++Pain Frequency++++]:  [++++Pain Location++++]:  [++++ Clinical Pain Assessment++++]       FUNCTIONAL ASSESSMENT:     Palliative Performance Scale (PPS):  PPS: 70       PSYCHOSOCIAL/SPIRITUAL SCREENING:     Any spiritual / Confucianist concerns:  [] Yes /  [x] No    Caregiver Burnout:  [] Yes /  [x] No /  [] No Caregiver Present      Anticipatory grief assessment:   [] Normal  / [] Maladaptive       ESAS Anxiety: Anxiety: 0    ESAS Depression: Depression: 0       REVIEW OF SYSTEMS:     The following systems were [x] reviewed / [] unable to be reviewed  Systems: constitutional, ears/nose/mouth/throat, respiratory, gastrointestinal, genitourinary, musculoskeletal, integumentary, neurologic, psychiatric, endocrine. Positive findings noted below. Modified ESAS Completed by: provider   Fatigue: 1 Drowsiness: 0   Depression: 0 Pain: 0   Anxiety: 0 Nausea: 0   Anorexia: 0 Dyspnea: 0   Best Well-Bein Constipation: No   Other Problem (Comment): 0          PHYSICAL EXAM:     Wt Readings from Last 3 Encounters:   10/03/18 164 lb 8 oz (74.6 kg)   18 160 lb 12.8 oz (72.9 kg)   18 156 lb 6.4 oz (70.9 kg)     Blood pressure 150/62, pulse 95, temperature 96.7 °F (35.9 °C), temperature source Oral, resp. rate 16, height 5' 2\" (1.575 m), weight 164 lb 8 oz (74.6 kg), SpO2 97 %.   Last bowel movement: See Nursing Note    Constitutional: sitting in wheelchair, appears relaxed, Teja Joao at aide  Eyes: pupils equal, anicteric  ENMT: no nasal discharge, moist mucous membranes; HARD OF HEARING  Cardiovascular: regular, rate and rhythm; I/VI MANUEL RUSB (non-radiating); trace pedal and ankle edema  Respiratory: breathing not labored, symmetric  Gastrointestinal: soft non-tender, +bowel sounds  Musculoskeletal: no deformity, no tenderness to palpation  Skin: warm, dry  Neurologic: alert, knows name and can give most history with confusion about details, following commands, moving all extremities  Psychiatric: full affect, no hallucinations  Other:       HISTORY:     Past Medical History:   Diagnosis Date    Cancer ([facility] [address]     Pancreatic    Dry eyes     Hypertension, essential     Migraine       No past surgical history on file. No family history on file. History reviewed, no pertinent family history. Social History   Substance Use Topics    Smoking status: Never Smoker    Smokeless tobacco: Never Used    Alcohol use No     No Known Allergies   Current Outpatient Prescriptions   Medication Sig    morphine CR (MS CONTIN) 15 mg CR tablet Take 1 Tab by mouth every eight (8) hours. Max Daily Amount: 45 mg.    aspirin delayed-release 81 mg tablet Take  by mouth daily as needed for Pain.  senna-docusate (SENNA PLUS) 8.6-50 mg per tablet Take 2 Tabs by mouth two (2) times a day.  DULCOLAX, BISACODYL, PO Take 0.5 Caps by mouth daily as needed. No current facility-administered medications for this visit.            LAB DATA REVIEWED:     No results found for: WBC, HGB, PLT, HGBEXT, PLTEXT, HGBEXT, PLTEXT  No results found for: NA, K, CL, CO2, BUN, CREA, CA, MG, PHOS   No results found for: SGOT, GPT, AP, TBIL, TP, ALB, GLOB, GGT  No results found for: INR, PTMR, PTP, PT1, PT2, APTT   No results found for: IRON, FE, TIBC, IBCT, PSAT, FERR        CONTROLLED SUBSTANCES SAFETY ASSESSMENT (IF ON CONTROLLED SUBSTANCES):     Reviewed opioid safety handout:  [x] Yes   [] No  24 hour opioid dose >150mg morphine equivalent/day:  [] Yes   [x] No  Benzodiazepines:  [] Yes   [x] No  Sleep apnea:  [] Yes   [x] No  Urine Toxicology Testing within last 6 months:  [] Yes   [] No  History of or new aberrant medication taking behaviors:  [] Yes   [] No            Total time:   Counseling / coordination time:   > 50% counseling / coordination?:

## 2018-10-03 NOTE — MR AVS SNAPSHOT
----- Message from Luz Buenrostro sent at 1/12/2017 12:51 PM CST -----  Contact: Patient  Patient called and said she needs to speak with someone about her scripts.    Please call her at [phone]   1111 [facility] 1200 [facility] [address] [place] [address] 
[phone] Patient: Corey Beckwith MRN: [id number] Visit Information Date & Time Provider Department Dept. Phone Encounter #  
 10/3/2018 10:30 AM Dinga Gonzalez [place] [id number] [id number] Upcoming Health Maintenance Date Due DTaP/Tdap/Td series (1 - Tdap) 1944 Shingrix Vaccine Age 50> (1 of 2) 1973 GLAUCOMA SCREENING Q2Y 1988 Bone Densitometry (Dexa) Screening 1988 Pneumococcal 65+ High/Highest Risk (1 of 2 - PCV13) 1988 Influenza Age 5 to Adult 2018 MEDICARE YEARLY EXAM 2019 Allergies as of 10/3/2018  Review Complete On: 10/3/2018 By: Gisel Abel LPN No Known Allergies Current Immunizations  Never Reviewed No immunizations on file. Not reviewed this visit You Were Diagnosed With   
  
 Codes Comments Abdominal pain, generalized     ICD-10-CM: R10.84 ICD-9-CM: 789.07 Vitals BP Pulse Temp Resp Height(growth percentile) Weight(growth percentile) 150/62 (BP 1 Location: Right arm, BP Patient Position: Sitting) 95 96.7 °F (35.9 °C) (Oral) 16 5' 2\" (1.575 m) 164 lb 8 oz (74.6 kg) SpO2 BMI OB Status Smoking Status 97% 30.09 kg/m2 Postmenopausal Never Smoker BMI and BSA Data Body Mass Index Body Surface Area 30.09 kg/m 2 1.81 m 2 Preferred Pharmacy Pharmacy Name Phone [facility] [address] [place] [address] [facility] Becky [address] [place] [address] [phone] Your Updated Medication List  
  
   
This list is accurate as of 10/3/18 11:10 AM.  Always use your most recent med list.  
  
  
  
  
 aspirin delayed-release 81 mg tablet Take  by mouth daily as needed for Pain. DULCOLAX (BISACODYL) PO Take 0.5 Caps by mouth daily as needed. morphine CR 15 mg CR tablet Commonly known as:  MS CONTIN Take 1 Tab by mouth every eight (8) hours. Max Daily Amount: 45 mg. Start taking on:  12/3/2018 SENNA PLUS 8.6-50 mg per tablet Generic drug:  senna-docusate Take 2 Tabs by mouth two (2) times a day. Prescriptions Printed Refills  
 morphine CR (MS CONTIN) 15 mg CR tablet 0 Starting on: 12/3/2018 Sig: Take 1 Tab by mouth every eight (8) hours. Max Daily Amount: 45 mg.  
 Class: Print Route: Oral  
  
Patient Instructions Dear Stephany Marquez , It was a pleasure seeing you in the Palliative Medicine Office today. This is what we talked about:  
 
1. Your abdominal pain 
-Your pain is under good control on your current pain medication regimen 
-Continue long-acting morphine 15-mg every 8 hours 
-You've been taking this for over a year and it's working well for you without any side-effects 
-You are not using any short-acting opioid pain medicines at this time 
-Sugey Valdez very occasionally gives you a baby aspirin when you have other types of pain like a headache 2. Your bowels 
-You've been having regular bowel movements without need for laxatives or stool softener 
-Continue senna or dulcolax as needed 3. Your fatigue 
-You have occasional mild fatigue 
-You and Sugey Valdez continue to enjoy several outings a week 4. Your appetite 
-Your appetite is good! 
-You enjoy several snacks a day 
-You are concerned about your weight gain 
-I reviewed your weights and your medical history 
-I'm not concerned about your weight 
-I encourage you to continue to enjoy your meals and your snacks! 5. Your function  
-You walk with a cane or a walker to help prevent falls.  You haven't had any falls since your last visit here 
-You have a wheelchair you can use when you go outside of your home 
-You feed yourself and help Sugey Valdez clean up after dinner by drying the dishes 
-You give yourself sponge baths every day with your aide or Sugey Valdez close by in case you need any help 
-You are able to get to the bathroom when you need to go and you don't have any accidents 
-Parker Dyson helps to organize your medications for you 
-You have an aide in your home to be with you when Parker Dyson is at work 
-Cristo Mora and Parker Dyson continue to go out twice a week for chores and for fun This is what you have shared with us about Advance Care Planning Advance Care Planning 4/11/2018 Patient's Healthcare Decision Maker is: Named in scanned ACP document Primary Decision Maker Name Ellen Cobb Primary Decision Maker Phone Number [phone] Primary Decision Maker Relationship to Patient Other relative Confirm Advance Directive Yes, on file Does the patient have other document types Do Not Resuscitate The Palliative Medicine Team is here to support you and your family. We will see you again in 12 weeks. Our Nurse Navigator Luba Jones will check in with you by phone before that time. If there are any concerns before that time, such as medication questions, worsening symptoms or a need to see a physician for an urgent or emergent situation; please call [phone] ([employer]). A physician is also on call after our normal business hours of 8am to 5pm.  
 
In order to serve you better, please allow up to 48 hours for prescription refills to be processed. Certain medications may require more paperwork or a written prescription that you may need to  from the office. We appreciate you letting us know of any refill requests as soon as possible. We also would like you to sign up for [employer] as well. Sincerely, Mimi Heredia MD and the Palliative Medicine Team 
 
 
  
Introducing [facility]! [address] introduces [employer] patient portal. Now you can access parts of your medical record, email your doctor's office, and request medication refills online. 1. In your internet browser, go to [email or web address] 2. Click on the First Time User? Click Here link in the Sign In box. You will see the New Member Sign Up page. 3. Enter your [employer] Access Code exactly as it appears below. You will not need to use this code after youve completed the sign-up process. If you do not sign up before the expiration date, you must request a new code. · [employer] Access Code: [email or web address] Expires: 12/18/2018  9:49 AM 
 
4. Enter the last four digits of your Social Security Number (xxxx) and Date of Birth (mm/dd/yyyy) as indicated and click Submit. You will be taken to the next sign-up page. 5. Create a [employer] ID. This will be your [employer] login ID and cannot be changed, so think of one that is secure and easy to remember. 6. Create a [employer] password. You can change your password at any time. 7. Enter your Password Reset Question and Answer. This can be used at a later time if you forget your password. 8. Enter your e-mail address. You will receive e-mail notification when new information is available in [address] 9. Click Sign Up. You can now view and download portions of your medical record. 10. Click the Download Summary menu link to download a portable copy of your medical information. If you have questions, please visit the Frequently Asked Questions section of the [employer] website. Remember, [employer] is NOT to be used for urgent needs. For medical emergencies, dial 911. Now available from your iPhone and Android! Please provide this summary of care documentation to your next provider. Your primary care clinician is listed as Merari Landon. If you have any questions after today's visit, please call [phone].

## 2018-10-03 NOTE — PROGRESS NOTES
Palliative Medicine Office Visit  Palliative Medicine Nurse Check In  ([phone])    Patient Name: Werner Nyhan  YOB: 1923      Date of Office Visit: 10/3/2018      Patient states: Patient is here for a follow up no issues. From Check In Sheet (scanned in Media):  Has a medical provider talked with you about cardiopulmonary resuscitation (CPR)? [x] Yes   [] No   [] Unable to obtain    Nurse reminder to complete or update ACP FlowSheet:    Is ACP on the Problem List?    [x] Yes    [] No  IF ACP Document is ON FILE; Nurse to place ACP on Problem List     Is there an ACP Note in Chart Review/Note? [x] Yes    [] No   If NO: [address] 4/11/2018   Patient's Healthcare Decision Maker is: Named in scanned ACP document   Primary Decision Maker Name Michele Luo   Primary Decision Maker Phone Number [phone]   Primary Decision Maker Relationship to Patient Other relative   Confirm Advance Directive Yes, on file   Does the patient have other document types Do Not Resuscitate       Is there anything that we should know about you as a person in order to provide you the best care possible? No  Have you been to the ER, urgent care clinic since your last visit? [] Yes   [x] No   [] Unable to obtain    Have you been hospitalized since your last visit? [] Yes   [x] No   [] Unable to obtain    Have you seen or consulted any other health care providers outside of the [address] [place] [address] since your last visit? [] Yes   [x] No   [] Unable to obtain    Functional status (describe):     Last BM: 10/2/18     accessed (date): 10/2/18    Bottle review (for opioid pain medication):  Medication 1:  morphine CR (MS CONTIN) 15 mg CR tablet     Date filled: 9/5/18  Directions: Take 1 Tab by mouth every eight (8) hours.  Max Daily Amount: 45 mg.              # filled: 90  # left: 12  # pills taking per day:3  Last dose taken:6AM    Medication 2:   Date filled: Directions:   # filled:   # left:   # pills taking per day:  Last dose taken:    Medication 3:   Date filled:   Directions:   # filled:   # left:   # pills taking per day:  Last dose taken:    Medication 4:   Date filled:   Directions:   # filled:   # left:   # pills taking per day:  Last dose taken:

## 2018-10-23 ENCOUNTER — OFFICE VISIT (OUTPATIENT)
Dept: INTERNAL MEDICINE CLINIC | Age: 83
End: 2018-10-23

## 2018-10-23 VITALS
DIASTOLIC BLOOD PRESSURE: 63 MMHG | HEART RATE: 84 BPM | HEIGHT: 62 IN | TEMPERATURE: 98.8 F | RESPIRATION RATE: 18 BRPM | BODY MASS INDEX: 30.09 KG/M2 | SYSTOLIC BLOOD PRESSURE: 152 MMHG | OXYGEN SATURATION: 95 %

## 2018-10-23 DIAGNOSIS — J01.90 ACUTE NON-RECURRENT SINUSITIS, UNSPECIFIED LOCATION: Primary | ICD-10-CM

## 2018-10-23 RX ORDER — AMOXICILLIN 875 MG/1
875 TABLET, FILM COATED ORAL 2 TIMES DAILY
Qty: 20 TAB | Refills: 0 | Status: SHIPPED | OUTPATIENT
Start: 2018-10-23 | End: 2019-04-24 | Stop reason: ALTCHOICE

## 2018-10-23 NOTE — PROGRESS NOTES
HISTORY OF PRESENT ILLNESS Steve Meneses is a 80 y.o. female. Chief Complaint Patient presents with  Sore Throat  
  since 10/17/18  Fever Health Maintenance Due Topic Date Due  
 DTaP/Tdap/Td series (1 - Tdap) 06/27/1944  Shingrix Vaccine Age 50> (1 of 2) 06/27/1973  GLAUCOMA SCREENING Q2Y  06/27/1988  Bone Densitometry (Dexa) Screening  06/27/1988  Pneumococcal 65+ High/Highest Risk (1 of 2 - PCV13) 06/27/1988  Influenza Age 5 to Adult  08/01/2018 Influenza: not UTD - she declines. HPI Sore throat x 6 days. Fever symptoms at home Temp Readings from Last 3 Encounters:  
10/23/18 98.8 °F (37.1 °C) (Oral) 10/03/18 96.7 °F (35.9 °C) (Oral) 09/19/18 98.8 °F (37.1 °C) (Oral) Pushing fluids. \"Thickness\" in head feeling. Sleeping more on Wed & Saturday - 3 hour naps, which is unusual for pt. Face feeling flushed off and on - took half of a Tylenol Cold two days and Flu and half dose Nyquil that night - slept well. Today vomited at 7:45 - ate breakfast at 6 AM. Not interested in eating lunch today. This is unusual for pt. Review of Systems Constitutional: Positive for malaise/fatigue. Negative for chills, diaphoresis and fever. HENT: Positive for congestion. Negative for ear pain. Respiratory: Negative for cough, sputum production and shortness of breath. Skin: Negative for itching and rash. Neurological: Positive for headaches. Endo/Heme/Allergies: Negative for environmental allergies. Physical Exam  
Constitutional: She is oriented to person, place, and time. She appears well-developed and well-nourished. No distress. HENT:  
Head: Normocephalic and atraumatic. Mouth/Throat: Oropharynx is clear and moist.  
Bilateral TMs with fluid. No erythema. Nasal mucosa red, inflamed. Eyes: Conjunctivae are normal.  
Neck: Neck supple. Cardiovascular: Normal rate, regular rhythm and normal heart sounds. Pulmonary/Chest: Effort normal and breath sounds normal.  
Lymphadenopathy:  
  She has no cervical adenopathy. Neurological: She is alert and oriented to person, place, and time. Skin: Skin is warm and dry. No rash noted. Nursing note and vitals reviewed. ASSESSMENT and PLAN Diagnoses and all orders for this visit: 
 
1. Acute non-recurrent sinusitis, unspecified location 
-     amoxicillin (AMOXIL) 875 mg tablet; Take 1 Tab by mouth two (2) times a day.

## 2019-01-02 ENCOUNTER — OFFICE VISIT (OUTPATIENT)
Dept: PALLATIVE CARE | Age: 84
End: 2019-01-02

## 2019-01-02 VITALS
TEMPERATURE: 98.5 F | WEIGHT: 158 LBS | OXYGEN SATURATION: 91 % | HEART RATE: 71 BPM | HEIGHT: 62 IN | BODY MASS INDEX: 29.08 KG/M2 | SYSTOLIC BLOOD PRESSURE: 135 MMHG | DIASTOLIC BLOOD PRESSURE: 68 MMHG | RESPIRATION RATE: 16 BRPM

## 2019-01-02 DIAGNOSIS — G89.29 CHRONIC PAIN OF BOTH KNEES: ICD-10-CM

## 2019-01-02 DIAGNOSIS — K59.03 DRUG-INDUCED CONSTIPATION: ICD-10-CM

## 2019-01-02 DIAGNOSIS — R41.3 MEMORY DIFFICULTIES: ICD-10-CM

## 2019-01-02 DIAGNOSIS — K86.89 PANCREATIC MASS: ICD-10-CM

## 2019-01-02 DIAGNOSIS — M25.562 CHRONIC PAIN OF BOTH KNEES: ICD-10-CM

## 2019-01-02 DIAGNOSIS — R10.84 ABDOMINAL PAIN, GENERALIZED: Primary | ICD-10-CM

## 2019-01-02 DIAGNOSIS — M25.561 CHRONIC PAIN OF BOTH KNEES: ICD-10-CM

## 2019-01-02 RX ORDER — MORPHINE SULFATE 15 MG/1
15 TABLET, FILM COATED, EXTENDED RELEASE ORAL EVERY 8 HOURS
Qty: 90 TAB | Refills: 0 | Status: SHIPPED | OUTPATIENT
Start: 2019-01-30 | End: 2019-02-27 | Stop reason: SDUPTHER

## 2019-01-02 RX ORDER — MORPHINE SULFATE 15 MG/1
15 TABLET, FILM COATED, EXTENDED RELEASE ORAL EVERY 8 HOURS
Qty: 90 TAB | Refills: 0 | Status: SHIPPED | OUTPATIENT
Start: 2019-01-02 | End: 2019-01-02 | Stop reason: SDUPTHER

## 2019-01-02 NOTE — PATIENT INSTRUCTIONS
Dear Jacqui England ,    It was a pleasure seeing you in the Palliative Medicine Office today. Dear Jacqui England ,    It was a pleasure seeing you today in [address]. Your stated goal:     Your described symptoms were: Fatigue: 0 Drowsiness: 0   Depression: 0 Pain: 0   Anxiety: 0 Nausea: 0   Anorexia: 0 Dyspnea: 0   Best Well-Bein Constipation: No   Other Problem (Comment): 0       This is the plan we talked about:    1. Your abdominal pain  -This is well-controlled   -Continue long-acting morphine 15-mg every 8 hours    2. Your knee pain  -You occasionally experience an achy pain in your knees  -This is mots likely from degenerative arthritis  -Continue Aspercreme as needed  -You can also use tylenol 500-mg 2 tabs every 8 hours as needed  -Ice packs may also provide relief    3. Your bowels  -You're moving your bowels regularly with senna and Miralax    4. Your function(we talked about this at length during your last visit)  -You walk with a cane or a walker to help prevent falls. You haven't had any falls since your last visit here  -You have a wheelchair you can use when you go outside of your home  -You feed yourself and help Emily Body clean up after dinner by drying the dishes  -You give yourself sponge baths every day with your aide or Emily Body close by in case you need any help  -You are able to get to the bathroom when you need to go and you don't have any accidents  -Emily Body helps to organize your medications for you  -You have an aide in your home to be with you when Emily Body is at work  -Cristo Mora and Emily Body continue to go out twice a week for chores and for fun    5.  Your memory  -You have some problems with your memory so Emily Body helps you with your care and with making decisions about your health care and your finances  -You requested that she do this in your [place] and in your directives appointing her as your general power-of-  -Based upon our work together and my exam today, you no longer have capacity to make these decisions on your own     This is what you have shared with us about Advance Care Planning:    Primary Decision Maker (Health Care Agent):Belle Mehta  Relationship to patient:Cousin  Phone [phone]  [x] Named in a scanned document   [] Legal Next of Kin  [] Guardian     Secondary Decision Maker ([address]):   Relationship to patient:  Phone number:  [] Named in a scanned document   [] Legal Next of Kin  [] Guardian     ACP documents you current have include:  [x] Advance Directive or Living Will  [x] Durable Do Not Resuscitate  [] Physician Orders for Scope of Treatment (POST)  [] Medical Power of   [] Other      The Palliative Medicine Team is here to support you and your family. We will see you again in 8 weeks. Sincerely,      Lacy Mane MD and the Palliative Medicine Team energy is good! 6. Your function(we talked about this at length during your last visit)  -You walk with a cane or a walker to help prevent falls.  You haven't had any falls since your last visit here  -You have a wheelchair you can use when you go outside of your home  -You feed yourself and help Donneta Postal clean up after dinner by drying the dishes  -You give yourself sponge baths every day with your aide or Donneta Postal close by in case you need any help  -You are able to get to the bathroom when you need to go and you don't have any accidents  -Donneta Postal helps to organize your medications for you  -You have an aide in your home to be with you when Donneta Postal is at work  -You and Donneta Postal continue to go out twice a week for chores and for fun    This is what you have shared with us about [address]. Planning 4/11/2018   Patient's Healthcare Decision Maker is: Named in scanned ACP document   Primary Decision Maker Name Chepe Drake   Primary Decision Maker Phone Number [phone]   Primary Decision Maker Relationship to Patient Other relative   Confirm Advance Directive Yes, on file   Does the patient have other document types Do Not Resuscitate         The Palliative Medicine Team is here to support you and your family. We will see you again in 8 weeks. Our Nurse Navigator Husam Diana will check in with you by phone before that time. If there are any concerns before that time, such as medication questions, worsening symptoms or a need to see a physician for an urgent or emergent situation; please call [phone] (COPE). A physician is also on call after our normal business hours of 8am to 5pm.     In order to serve you better, please allow up to 48 hours for prescription refills to be processed. Certain medications may require more paperwork or a written prescription that you may need to  from the office. We appreciate you letting us know of any refill requests as soon as possible. We also would like you to sign up for [employer] as well.     Sincerely,      Vanessa Jamison MD and the Palliative Medicine Team

## 2019-01-02 NOTE — PROGRESS NOTES
Palliative Medicine Office Visit  Palliative Medicine Nurse Check In  [phone]    Patient Name: Chelsi Echavarria  YOB: 1923      Date of Office Visit: 1/2/2019      Patient states: Patient is here for a follow up no issues. From Check In Sheet (scanned in Media):  Has a medical provider talked with you about cardiopulmonary resuscitation (CPR)? [x] Yes   [] No   [] Unable to obtain    Nurse reminder to complete or update ACP FlowSheet:    Is ACP on the Problem List?    [x] Yes    [] No  IF ACP Document is ON FILE; Nurse to place ACP on Problem List     Is there an ACP Note in Chart Review/Note? [x] Yes    [] No   If NO: [address] 4/11/2018   Patient's Healthcare Decision Maker is: Named in scanned ACP document   Primary Decision Maker Name Jean Paul Wilkes   Primary Decision Maker Phone Number [phone]   Primary Decision Maker Relationship to Patient Other relative   Confirm Advance Directive Yes, on file   Does the patient have other document types Do Not Resuscitate   Primary Decision Maker ([facility] [place] Agent):Belle Barbosa  Relationship to patient:Cousin  Phone [phone]  [x] Named in a scanned document   [] Legal Next of Kin  [] Guardian    Secondary Decision Maker ([address]):   Relationship to patient:  Phone number:  [] Named in a scanned document   [] Legal Next of Kin  [] Guardian    ACP documents you current have include:  [x] Advance Directive or Living Will  [x] Durable Do Not Resuscitate  [] Physician Orders for Scope of Treatment (POST)  [] Medical Power of   [] Other      Is there anything that we should know about you as a person in order to provide you the best care possible? No    Have you been to the ER, urgent care clinic since your last visit? [] Yes   [x] No   [] Unable to obtain    Have you been hospitalized since your last visit?    [] Yes   [x] No   [] Unable to obtain    Have you seen or consulted any other health care providers outside of [facility] [place] since your last visit? [] Yes   [x] No   [] Unable to obtain    Functional status (describe):     Last BM:1/1/18     accessed (date): 12/31/18    Bottle review (for opioid pain medication):  Medication 1: morphine CR (MS CONTIN) 15 mg CR tablet     12/4/18  Date filled:   Directions: Take 1 Tab by mouth every eight (8) hours. Max Daily Amount: 45 mg.   # filled: 90  # left: 11  # pills taking per day: 3  Last dose taken:6AM    Medication 2:   Date filled:   Directions:   # filled:   # left:   # pills taking per day:  Last dose taken:    Medication 3:   Date filled:   Directions:   # filled:   # left:   # pills taking per day:  Last dose taken:    Medication 4:   Date filled:   Directions:   # filled:   # left:   # pills taking per day:  Last dose taken:

## 2019-01-02 NOTE — PROGRESS NOTES
Palliative Medicine Outpatient Services  [place]: [phone] ([phone])    Patient Name: Stephany Blue  YOB: 1923    Date of Current Visit: 01/02/19  Location of Current Visit:    [x] [facility] Office  [] [address] Office  [] [facility] Office  [] Home  [] Other:      Date of Initial Visit: 2/28/18   Requesting Physician: Maurice Burns PA-C  Primary Care Physician: Benji Chen PA-C      SUMMARY:   Stephany Blue is a 80y.o. year old with a past history of pancreatic mass by CT suspicious for malignancy, who was referred to Palliative Medicine by Ms. Anupama Nunez for symptom management and supportive care. She was hospitalized in [place] in 7/2017 with decreased appetite, functional decline and abdominal pain. 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 She declined further work-up (no biopsy). She was given a presumptive diagnosis of pancreatic cancer. She was discharged to live with her closest living relative, 2nd cousin/POA, Melany Remy, and was followed by [facility] until recently when  she was discharged from hospice for failure to decline. She's thrived since hospital discharge, maintaining her functional abilities and with a 25# weight gain. The patients social history includes: she is . She has no living siblings. She lives in [place] with her second cousin, Melany Remy, and her beloved Evrichard Levy. Palliative Medicine is addressing the following current patient/family concerns: abdominal pain, mild fatigue, constipation, dementia. PALLIATIVE DIAGNOSES:       ICD-10-CM ICD-9-CM    1. Abdominal pain, generalized R10.84 789.07 morphine CR (MS CONTIN) 15 mg CR tablet      DISCONTINUED: morphine CR (MS CONTIN) 15 mg CR tablet   2. Chronic pain of both knees M25.561 719.46     M25.562 338.29     G89.29     3. Drug-induced constipation K59.03 564.09      E980.5    4. Memory difficulties R41.3 780.93    5. Pancreatic mass K86.9 577.9           PLAN:   Patient Instructions     Dear Venita Taylor ,    It was a pleasure seeing you in the Palliative Medicine Office today. Dear Venita Taylro ,    It was a pleasure seeing you today in [facility] [address]. Your stated goal:     Your described symptoms were: Fatigue: 0 Drowsiness: 0   Depression: 0 Pain: 0   Anxiety: 0 Nausea: 0   Anorexia: 0 Dyspnea: 0   Best Well-Bein Constipation: No   Other Problem (Comment): 0       This is the plan we talked about:    1. Your abdominal pain  -This is well-controlled   -Continue long-acting morphine 15-mg every 8 hours    2. Your knee pain  -You occasionally experience an achy pain in your knees  -This is mots likely from degenerative arthritis  -Continue Aspercreme as needed  -You can also use tylenol 500-mg 2 tabs every 8 hours as needed  -Ice packs may also provide relief    3. Your bowels  -You're moving your bowels regularly with senna and Miralax    4. Your function(we talked about this at length during your last visit)  -You walk with a cane or a walker to help prevent falls. You haven't had any falls since your last visit here  -You have a wheelchair you can use when you go outside of your home  -You feed yourself and help Trae Nelson clean up after dinner by drying the dishes  -You give yourself sponge baths every day with your aide or Trae Nelson close by in case you need any help  -You are able to get to the bathroom when you need to go and you don't have any accidents  -Trae Nelson helps to organize your medications for you  -You have an aide in your home to be with you when Trae Nelson is at work  -Cristo Mora and Trae Nelson continue to go out twice a week for chores and for fun    5.  Your memory  -You have some problems with your memory so Trae Nelson helps you with your care and with making decisions about your health care and your finances  -You requested that she do this in your [place] and in your directives appointing her as your general power-of-  -Based upon our work together and my exam today, you no longer have capacity to make these decisions on your own     This is what you have shared with us about Advance Care Planning:    Primary Decision Maker ([employer] [place] Agent):Belle Dominguez  Relationship to patient:Cousin  Phone number:[phone]  [x] Named in a scanned document   [] Legal Next of Kin  [] Guardian     Secondary Decision Maker ([address]):   Relationship to patient:  Phone number:  [] Named in a scanned document   [] Legal Next of Kin  [] Guardian     ACP documents you current have include:  [x] Advance Directive or Living Will  [x] Durable Do Not Resuscitate  [] Physician Orders for Scope of Treatment (POST)  [] Medical Power of   [] Other      The Palliative Medicine Team is here to support you and your family. We will see you again in 8 weeks. Sincerely,      Shameka Elizabeth MD and the Palliative Medicine Team energy is good! 6. Your function(we talked about this at length during your last visit)  -You walk with a cane or a walker to help prevent falls.  You haven't had any falls since your last visit here  -You have a wheelchair you can use when you go outside of your home  -You feed yourself and help Medardo Minda clean up after dinner by drying the dishes  -You give yourself sponge baths every day with your aide or Medardo Minda close by in case you need any help  -You are able to get to the bathroom when you need to go and you don't have any accidents  -Medardo Minda helps to organize your medications for you  -You have an aide in your home to be with you when Medardo Minda is at work  -You and Medardo Minda continue to go out twice a week for chores and for fun    This is what you have shared with us about [address]. Planning 4/11/2018   Patient's Healthcare Decision Maker is: Named in scanned ACP document   Primary Decision Maker Name Elaine Vincerea   Primary Decision Maker Phone Number [phone]   Primary Decision Maker Relationship to Patient Other relative   Confirm Advance Directive Yes, on file   Does the patient have other document types Do Not Resuscitate         The Palliative Medicine Team is here to support you and your family. We will see you again in 8 weeks. Our Nurse Navigator Husam Diana will check in with you by phone before that time. If there are any concerns before that time, such as medication questions, worsening symptoms or a need to see a physician for an urgent or emergent situation; please call [phone] (COPE). A physician is also on call after our normal business hours of 8am to 5pm.     In order to serve you better, please allow up to 48 hours for prescription refills to be processed. Certain medications may require more paperwork or a written prescription that you may need to  from the office. We appreciate you letting us know of any refill requests as soon as possible. We also would like you to sign up for [employer] as well.     Sincerely,      Vanessa Jamison MD and the Palliative Medicine Team      Counseling and Coordination: note routed to [address] / TREATMENT PREFERENCES:   [====Goals of Care====]  GOALS OF CARE:  Patient / health care proxy stated goals: maintenance of quality of life      TREATMENT PREFERENCES:   Code Status:  [] Attempt Resuscitation       [x] Do Not Attempt Resuscitation    Advance Care Planning:  Advance Care Planning 4/11/2018   Patient's Healthcare Decision Maker is: Named in scanned ACP document   Primary Decision Maker Name Bozena Paul   Primary Decision Maker Phone Number [phone]   Primary Decision Maker Relationship to Patient Other relative   Confirm Advance Directive Yes, on file   Does the patient have other document types Do Not Resuscitate       Other:  (If patient appropriate for POST, consider using PALLPOST smart phrase here)    The palliative care team has discussed with patient / health care proxy about goals of care / treatment preferences for patient.  [====Goals of Care====]         PRESCRIPTIONS GIVEN:     Medications Ordered Today   Medications    DISCONTD: morphine CR (MS CONTIN) 15 mg CR tablet     Sig: Take 1 Tab by mouth every eight (8) hours. Max Daily Amount: 45 mg. Dispense:  90 Tab     Refill:  0    morphine CR (MS CONTIN) 15 mg CR tablet     Sig: Take 1 Tab by mouth every eight (8) hours. Max Daily Amount: 45 mg. Dispense:  90 Tab     Refill:  0           FOLLOW UP:     Future Appointments   Date Time Provider Getachew Delcidi   4/24/2019  8:00 AM RENE Martinez IN CARE:   Patient Care Team:  Hetal Garza as PCP - General (Physician Assistant)  Bharathi Minor MD as Physician (Palliative Medicine)       HISTORY:   Nursing documentation from date of visit reviewed. Reviewed patient-completed ESAS and advance care planning form. Reviewed patient record in prescription monitoring program.    CHIEF COMPLAINT: abdominal pain    HPI/SUBJECTIVE:    The patient is: [x] Verbal / [] Nonverbal (most history obtained from primary caregiver, Geraldine Blood, due to memory difficulties)    She feels good today. She and Geraldine Blood had a good Xmas. They went to [place] [facility] [employer] [employer]. They have a new dog, Dasia Albright. He's getting along with their other dog, Rosa. She's had no pain except for her knees which bother her sometimes. She thinks this is from her arthritis. Geraldine Blood uses a cream which helps. She's keeping up with her breathing exercises and with her weights. She's eating well, really too much! She's moving her bowels. She and Geraldine Blood are getting out to do fun things. She's still able to help out at dinner-time. She hasn't had any falls.       Clinical Pain Assessment (nonverbal scale for nonverbal patients):   [++++ Clinical Pain Assessment++++]  [++++Pain Severity++++]: Pain: 0  [++++Pain Character++++]:  [++++Pain Duration++++]:  [++++Pain Effect++++]:  [++++Pain Factors++++]:  [++++Pain Frequency++++]:  [++++Pain Location++++]:  [++++ Clinical Pain Assessment++++]       FUNCTIONAL ASSESSMENT:     Palliative Performance Scale (PPS):  PPS: 70       PSYCHOSOCIAL/SPIRITUAL SCREENING:     Any spiritual / Moravian concerns:  [] Yes /  [x] No    Caregiver Burnout:  [] Yes /  [x] No /  [] No Caregiver Present      Anticipatory grief assessment:   [] Normal  / [] Maladaptive       ESAS Anxiety: Anxiety: 0    ESAS Depression: Depression: 0       REVIEW OF SYSTEMS:     The following systems were [x] reviewed / [] unable to be reviewed  Systems: constitutional, ears/nose/mouth/throat, respiratory, gastrointestinal, genitourinary, musculoskeletal, integumentary, neurologic, psychiatric, endocrine. Positive findings noted below. Modified ESAS Completed by: provider   Fatigue: 0 Drowsiness: 0   Depression: 0 Pain: 0   Anxiety: 0 Nausea: 0   Anorexia: 0 Dyspnea: 0   Best Well-Bein Constipation: No   Other Problem (Comment): 0          PHYSICAL EXAM:     Wt Readings from Last 3 Encounters:   19 158 lb (71.7 kg)   10/03/18 164 lb 8 oz (74.6 kg)   18 160 lb 12.8 oz (72.9 kg)     Blood pressure 135/68, pulse 71, temperature 98.5 °F (36.9 °C), temperature source Oral, resp. rate 16, height 5' 2\" (1.575 m), weight 158 lb (71.7 kg), SpO2 91 %.   Last bowel movement: See Nursing Note    Constitutional: sitting in wheelchair, appears relaxed, Gonzaelz Gess at side  Eyes: pupils equal, anicteric  ENMT: no nasal discharge, moist mucous membranes; HARD OF HEARING  Cardiovascular: regular, rate and rhythm; I/VI MANUEL RUSB (non-radiating); trace pedal and ankle edema  Respiratory: breathing not labored, symmetric  Gastrointestinal: soft non-tender, +bowel sounds  Musculoskeletal: no deformity, no tenderness to palpation  Skin: warm, dry  Neurologic: alert, oriented to name, relates [place] activities with some confusion about details    knows name and can give most history with confusion about details, following commands, moving all extremities  Psychiatric: full affect, no hallucinations  Other:       HISTORY:     Past Medical History:   Diagnosis Date    Cancer ([address]     Pancreatic    Dry eyes     Hypertension, essential     Migraine       No past surgical history on file. No family history on file. History reviewed, no pertinent family history. Social History     Tobacco Use    Smoking status: Never Smoker    Smokeless tobacco: Never Used   Substance Use Topics    Alcohol use: No     No Known Allergies   Current Outpatient Medications   Medication Sig    morphine CR (MS CONTIN) 15 mg CR tablet Take 1 Tab by mouth every eight (8) hours. Max Daily Amount: 45 mg.    aspirin delayed-release 81 mg tablet Take  by mouth daily as needed for Pain.  senna-docusate (SENNA PLUS) 8.6-50 mg per tablet Take 2 Tabs by mouth two (2) times a day.  amoxicillin (AMOXIL) 875 mg tablet Take 1 Tab by mouth two (2) times a day.  DULCOLAX, BISACODYL, PO Take 0.5 Caps by mouth daily as needed. No current facility-administered medications for this visit.            LAB DATA REVIEWED:     No results found for: WBC, HGB, PLT, HGBEXT, PLTEXT, HGBEXT, PLTEXT  No results found for: NA, K, CL, CO2, BUN, CREA, CA, MG, PHOS   No results found for: SGOT, GPT, AP, TBIL, TP, ALB, GLOB, GGT  No results found for: INR, PTMR, PTP, PT1, PT2, APTT   No results found for: IRON, FE, TIBC, IBCT, PSAT, FERR        CONTROLLED SUBSTANCES SAFETY ASSESSMENT (IF ON CONTROLLED SUBSTANCES):     Reviewed opioid safety handout:  [x] Yes   [] No  24 hour opioid dose >150mg morphine equivalent/day:  [] Yes   [x] No  Benzodiazepines:  [] Yes   [x] No  Sleep apnea:  [] Yes   [x] No  Urine Toxicology Testing within last 6 months:  [] Yes   [] No  History of or new aberrant medication taking behaviors:  [] Yes   [] No            Total time: Counseling / coordination time:   > 50% counseling / coordination?:

## 2019-02-25 ENCOUNTER — TELEPHONE (OUTPATIENT)
Dept: PALLATIVE CARE | Age: 84
End: 2019-02-25

## 2019-02-25 NOTE — TELEPHONE ENCOUNTER
Called patient to advise/confirm upcoming appt with Dr. Kimberly Acevedo on 2/27/1     at 9:30am at [facility]. Ms. Cordell Vazquez confirmed  Also advised to please bring in your Drivers License and Insurance Card with you to appointment as well as any prescription pain medication in the original container with you to appt.

## 2019-02-27 ENCOUNTER — OFFICE VISIT (OUTPATIENT)
Dept: PALLATIVE CARE | Age: 84
End: 2019-02-27

## 2019-02-27 VITALS
BODY MASS INDEX: 30.77 KG/M2 | TEMPERATURE: 98.3 F | DIASTOLIC BLOOD PRESSURE: 77 MMHG | RESPIRATION RATE: 16 BRPM | OXYGEN SATURATION: 93 % | SYSTOLIC BLOOD PRESSURE: 136 MMHG | WEIGHT: 167.2 LBS | HEART RATE: 78 BPM | HEIGHT: 62 IN

## 2019-02-27 DIAGNOSIS — M25.561 CHRONIC PAIN OF BOTH KNEES: ICD-10-CM

## 2019-02-27 DIAGNOSIS — R41.3 MEMORY DIFFICULTIES: ICD-10-CM

## 2019-02-27 DIAGNOSIS — R10.84 ABDOMINAL PAIN, GENERALIZED: Primary | ICD-10-CM

## 2019-02-27 DIAGNOSIS — K59.03 DRUG-INDUCED CONSTIPATION: ICD-10-CM

## 2019-02-27 DIAGNOSIS — M25.562 CHRONIC PAIN OF BOTH KNEES: ICD-10-CM

## 2019-02-27 DIAGNOSIS — K86.89 PANCREATIC MASS: ICD-10-CM

## 2019-02-27 DIAGNOSIS — G89.29 CHRONIC PAIN OF BOTH KNEES: ICD-10-CM

## 2019-02-27 RX ORDER — MORPHINE SULFATE 15 MG/1
15 TABLET, FILM COATED, EXTENDED RELEASE ORAL EVERY 8 HOURS
Qty: 90 TAB | Refills: 0 | Status: SHIPPED | OUTPATIENT
Start: 2019-04-24 | End: 2019-05-15 | Stop reason: SDUPTHER

## 2019-02-27 RX ORDER — MORPHINE SULFATE 15 MG/1
15 TABLET, FILM COATED, EXTENDED RELEASE ORAL EVERY 8 HOURS
Qty: 90 TAB | Refills: 0 | Status: SHIPPED | OUTPATIENT
Start: 2019-03-27 | End: 2019-02-27 | Stop reason: SDUPTHER

## 2019-02-27 RX ORDER — MORPHINE SULFATE 15 MG/1
15 TABLET, FILM COATED, EXTENDED RELEASE ORAL EVERY 8 HOURS
Qty: 90 TAB | Refills: 0 | Status: SHIPPED | OUTPATIENT
Start: 2019-02-27 | End: 2019-02-27 | Stop reason: SDUPTHER

## 2019-02-27 NOTE — PROGRESS NOTES
Palliative Medicine Outpatient Services  [place]: [phone] ([phone])    Patient Name: Fabienne Rodriguez  YOB: 1923    Date of Current Visit: 02/27/19  Location of Current Visit:    [x] [facility] Office  [] [facility] Office  [] [facility] Office  [] Home  [] Other:      Date of Initial Visit: 2/28/18   Requesting Physician: Joseph Alegria PA-C  Primary Care Physician: Medina Mallory PA-C      SUMMARY:   Fabienne Rodriguez is a 80y.o. year old with a past history of pancreatic mass by CT suspicious for malignancy, who was referred to Palliative Medicine by Ms. Annette Pham for symptom management and supportive care. She was hospitalized in [place] in 7/2017 with decreased appetite, functional decline and abdominal pain. 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 She declined further work-up (no biopsy). She was given a presumptive diagnosis of pancreatic cancer. She was discharged to live with her closest living relative, 2nd cousin/POA, All Fox, and was followed by [facility] until recently when  she was discharged from hospice for failure to decline. She's thrived since hospital discharge, maintaining her functional abilities and with a significant weight gain. The patients social history includes: she is . She has no children. She has no living siblings. She lives in [place] with her second cousin/POA, All Fox, and her beloved Kuldip Paredes. Palliative Medicine is addressing the following current patient/family concerns: abdominal pain, mild fatigue, constipation, dementia, maintaining function and quality of life. PALLIATIVE DIAGNOSES:       ICD-10-CM ICD-9-CM    1. Abdominal pain, generalized R10.84 789.07 morphine CR (MS CONTIN) 15 mg CR tablet      DISCONTINUED: morphine CR (MS CONTIN) 15 mg CR tablet      DISCONTINUED: morphine CR (MS CONTIN) 15 mg CR tablet   2.  Chronic pain of both knees M25.561 719.46 M25.562 338.29     G89.29     3. Memory difficulties R41.3 780.93    4. Drug-induced constipation K59.03 564.09      E980.5    5. Pancreatic mass K86.9 577.9           PLAN:   Patient Instructions     Dear Zelda Sanchez ,    It was a pleasure seeing you in the Palliative Medicine Office today. Dear Zelda Sanchez ,    It was a pleasure seeing you today in . [address]. Your stated goal: staying active    Your described symptoms were: Fatigue: 0 Drowsiness: 0   Depression: 0 Pain: 0   Anxiety: 0 Nausea: 0   Anorexia: 0 Dyspnea: 0   Best Well-Bein Constipation: No   Other Problem (Comment): 0       This is the plan we talked about:    1. Continue long-acting morphine 15-mg every 8 hours for your abdominal pain    2. You're moving your bowels regularly with senna and Miralax    3. You are doing well with your ability to do things for yourself with Belle's help!  -You walk with a cane or a walker to help prevent falls.  You haven't had any falls since your last visit here  -You have a wheelchair you can use when you go outside of your home  -You feed yourself and help David Causey clean up after dinner by drying the dishes  -You give yourself sponge baths every day with your aide or David Causey close by in case you need any help  -You are able to get to the bathroom when you need to go and you don't have any accidents  -David Causey helps to organize your medications for you  -You have an aide in your home to be with you when David Causey is at work  -Cristo Mora and David Causey continue to go out twice a week for chores and for fun      This is what you have shared with us about Advance Care Planning:    Primary Decision Maker (Health Care Agent):Belel Mcghee Purchase  Relationship to patient:Cousin  Phone number:[phone]  [x] Named in a scanned document   [] Legal Next of Kin  [] Guardian     Secondary Decision Maker ([address]):   Relationship to patient:  Phone number:  [] Named in a scanned document [] Legal Next of Kin  [] Guardian     ACP documents you current have include:  [x] Advance Directive or Living Will  [x] Durable Do Not Resuscitate  [] Physician Orders for Scope of Treatment (POST)  [] Medical Power of   [] Other      The Palliative Medicine Team is here to support you and your family. We will see you again in 12 weeks. Sincerely,      Mac Costa MD and the Palliative Medicine Team energy is good! Counseling and Coordination: note routed to [address] / TREATMENT PREFERENCES:   [====Goals of Care====]  GOALS OF CARE:  Patient / health care proxy stated goals: maintenance of quality of life      TREATMENT PREFERENCES:   Code Status:  [] Attempt Resuscitation       [x] Do Not Attempt Resuscitation      Primary Decision MakerIldefonso Abraham - Other Relative - [phone]  Advance Care Planning:  Advance Care Planning 2/27/2019   Patient's Healthcare Decision Maker is: Named in scanned ACP document   Primary Decision Maker Name -   Primary Decision Maker Phone Number -   Primary Decision Maker Relationship to Patient -   Confirm Advance Directive Yes, on file   Does the patient have other document types Do Not Resuscitate       Other:  (If patient appropriate for POST, consider using PALLPOST smart phrase here)    The palliative care team has discussed with patient / health care proxy about goals of care / treatment preferences for patient.  [====Goals of Care====]         PRESCRIPTIONS GIVEN:     Medications Ordered Today   Medications    DISCONTD: morphine CR (MS CONTIN) 15 mg CR tablet     Sig: Take 1 Tab by mouth every eight (8) hours for 30 days. Max Daily Amount: 45 mg. Dispense:  90 Tab     Refill:  0    DISCONTD: morphine CR (MS CONTIN) 15 mg CR tablet     Sig: Take 1 Tab by mouth every eight (8) hours for 30 days. Max Daily Amount: 45 mg.      Dispense:  90 Tab     Refill:  0    morphine CR (MS CONTIN) 15 mg CR tablet     Sig: Take 1 Tab by mouth every eight (8) hours for 30 days. Max Daily Amount: 45 mg. Dispense:  90 Tab     Refill:  0           FOLLOW UP:     Future Appointments   Date Time Provider Getachew Pearson   4/24/2019  8:00 AM RENE Moore IN CARE:   Patient Care Team:  Concepcion Lemos as PCP - General (Physician Assistant)  Erin Ann MD as Physician (Palliative Medicine)       HISTORY:   Nursing documentation from date of visit reviewed. Reviewed patient-completed ESAS and advance care planning form. Reviewed patient record in prescription monitoring program.    CHIEF COMPLAINT: abdominal pain    HPI/SUBJECTIVE:    The patient is: [x] Verbal / [] Nonverbal (most history obtained from primary caregiver, Esvin Massey, due to memory difficulties)    She's had a great few months! She's had no pain. She's eating well, too well! She's had no nausea or vomiting    She's moving her bowels regularly using senna daily. She sleeps well at night. She gets up and gets dressed every morning, reads the paper, likes to watch TV. Her canine companions, Cookie Trivedi and Tricia, and her cat, Tanesha Polanco, are doing well. She continues to help out around the house. She and Esvin Massey go out several times a week for fun.        Clinical Pain Assessment (nonverbal scale for nonverbal patients):   [++++ Clinical Pain Assessment++++]  [++++Pain Severity++++]: Pain: 0  [++++Pain Character++++]:  [++++Pain Duration++++]:  [++++Pain Effect++++]:  [++++Pain Factors++++]:  [++++Pain Frequency++++]:  [++++Pain Location++++]:  [++++ Clinical Pain Assessment++++]       FUNCTIONAL ASSESSMENT:     Palliative Performance Scale (PPS):  PPS: 70       PSYCHOSOCIAL/SPIRITUAL SCREENING:     Any spiritual / Confucianist concerns:  [] Yes /  [x] No    Caregiver Burnout:  [] Yes /  [x] No /  [] No Caregiver Present      Anticipatory grief assessment:   [] Normal  / [] Maladaptive       ESAS Anxiety: Anxiety: 0    ESAS Depression: Depression: 0       REVIEW OF SYSTEMS:     The following systems were [x] reviewed / [] unable to be reviewed  Systems: constitutional, ears/nose/mouth/throat, respiratory, gastrointestinal, genitourinary, musculoskeletal, integumentary, neurologic, psychiatric, endocrine. Positive findings noted below. Modified ESAS Completed by: provider   Fatigue: 0 Drowsiness: 0   Depression: 0 Pain: 0   Anxiety: 0 Nausea: 0   Anorexia: 0 Dyspnea: 0   Best Well-Bein Constipation: No   Other Problem (Comment): 0          PHYSICAL EXAM:     Wt Readings from Last 3 Encounters:   19 167 lb 3.2 oz (75.8 kg)   19 158 lb (71.7 kg)   10/03/18 164 lb 8 oz (74.6 kg)     Blood pressure 136/77, pulse 78, temperature 98.3 °F (36.8 °C), temperature source Oral, resp. rate 16, height 5' 2\" (1.575 m), weight 167 lb 3.2 oz (75.8 kg), SpO2 93 %. Last bowel movement: See Nursing Note    Constitutional: sitting in wheelchair, appears relaxed, Charna Shy at side  Eyes: pupils equal, anicteric  ENMT: no nasal discharge, moist mucous membranes; HARD OF HEARING  Cardiovascular: regular, rate and rhythm; I/VI MANUEL RUSB (non-radiating); trace pedal and ankle edema  Respiratory: breathing not labored, symmetric  Gastrointestinal: soft non-tender, +bowel sounds  Musculoskeletal: no deformity, no tenderness to palpation  Skin: warm, dry  Neurologic: alert, oriented to name, relates stories about her pets, following commands, moving all extremities  Psychiatric: full affect, no hallucinations  Other:       HISTORY:     Past Medical History:   Diagnosis Date    Cancer ([facility] [address]     Pancreatic    Dry eyes     Hypertension, essential     Migraine       No past surgical history on file. No family history on file. History reviewed, no pertinent family history.   Social History     Tobacco Use    Smoking status: Never Smoker    Smokeless tobacco: Never Used   Substance Use Topics    Alcohol use: No     No Known Allergies   Current Outpatient Medications   Medication Sig    morphine CR (MS CONTIN) 15 mg CR tablet Take 1 Tab by mouth every eight (8) hours. Max Daily Amount: 45 mg.    senna-docusate (SENNA PLUS) 8.6-50 mg per tablet Take 2 Tabs by mouth two (2) times a day.  amoxicillin (AMOXIL) 875 mg tablet Take 1 Tab by mouth two (2) times a day.  aspirin delayed-release 81 mg tablet Take  by mouth daily as needed for Pain.  DULCOLAX, BISACODYL, PO Take 0.5 Caps by mouth daily as needed. No current facility-administered medications for this visit.            LAB DATA REVIEWED:     No results found for: WBC, HGB, PLT, HGBEXT, PLTEXT, HGBEXT, PLTEXT  No results found for: NA, K, CL, CO2, BUN, CREA, CA, MG, PHOS   No results found for: SGOT, GPT, AP, TBIL, TP, ALB, GLOB, GGT  No results found for: INR, PTMR, PTP, PT1, PT2, APTT   No results found for: IRON, FE, TIBC, IBCT, PSAT, FERR        CONTROLLED SUBSTANCES SAFETY ASSESSMENT (IF ON CONTROLLED SUBSTANCES):     Reviewed opioid safety handout:  [x] Yes   [] No  24 hour opioid dose >150mg morphine equivalent/day:  [] Yes   [x] No  Benzodiazepines:  [] Yes   [x] No  Sleep apnea:  [] Yes   [x] No  Urine Toxicology Testing within last 6 months:  [] Yes   [x] No  History of or new aberrant medication taking behaviors:  [] Yes   [x] No  Narcan prescribed:  [] Yes   [x] No          Total time:   Counseling / coordination time:   > 50% counseling / coordination?:

## 2019-02-27 NOTE — PROGRESS NOTES
Palliative Medicine Office Visit  Palliative Medicine Nurse Check In  [phone]    Patient Name: Minal Pinedo  YOB: 1923      Date of Office Visit:  2/27/2019      Patient states:  Patient here for a follow up no issues. From Check In Sheet (scanned in Media):  Has a medical provider talked with you about cardiopulmonary resuscitation (CPR)? [x] Yes   [] No   [] Unable to obtain    Nurse reminder to complete or update ACP FlowSheet:    Is ACP on the Problem List?    [x] Yes    [] No  IF ACP Document is ON FILE; Nurse to place ACP on Problem List     Is there an ACP Note in Chart Review/Note? [x] Yes    [] No   If NO: [address] Planning 4/11/2018   Patient's Healthcare Decision Maker is: Named in scanned ACP document   Primary Decision Maker Name Elaine Obregon   Primary Decision Maker Phone Number [phone]   Primary Decision Maker Relationship to Patient Other relative   Confirm Advance Directive Yes, on file   Does the patient have other document types Do Not Resuscitate       Primary Decision MakerSuztawana Contreras - Other Relative - [phone]  Advance Care Planning 2/27/2019   Patient's [address] is: Named in scanned ACP document   Primary Decision Maker Name -   Primary Decision Maker Phone Number -   Primary Decision Maker Relationship to Patient -   Confirm Advance Directive Yes, on file   Does the patient have other document types Do Not Resuscitate       Is there anything that we should know about you as a person in order to provide you the best care possible? Have you been to the ER, urgent care clinic since your last visit? [] Yes   [x] No   [] Unable to obtain    Have you been hospitalized since your last visit? [] Yes   [x] No   [] Unable to obtain    Have you seen or consulted any other health care providers outside of the [address] [place] [address] since your last visit?    [] Yes   [x] No   [] Unable to obtain    Functional status (describe):         Last BM: 2/26/19     accessed (date): 2/26/19    Bottle review (for opioid pain medication):  Medication 1: morphine CR (MS CONTIN) 15 mg CR tablet       Date filled: 2/1/19  Directions: Take 1 Tab by mouth every eight (8) hours. Max Daily Amount: 45 mg.   # filled: 90  # left: 23  # pills taking per day:3  Last dose taken:6AM    Medication 2:   Date filled:   Directions:   # filled:   # left:   # pills taking per day:  Last dose taken:    Medication 3:   Date filled:   Directions:   # filled:   # left:   # pills taking per day:  Last dose taken:    Medication 4:   Date filled:   Directions:   # filled:   # left:   # pills taking per day:  Last dose taken:

## 2019-02-27 NOTE — PATIENT INSTRUCTIONS
Deachichi Kenyon ,    It was a pleasure seeing you in the Palliative Medicine Office today. Deachichi Kenyon ,    It was a pleasure seeing you today in . [address]. Your stated goal: staying active    Your described symptoms were: Fatigue: 0 Drowsiness: 0   Depression: 0 Pain: 0   Anxiety: 0 Nausea: 0   Anorexia: 0 Dyspnea: 0   Best Well-Bein Constipation: No   Other Problem (Comment): 0       This is the plan we talked about:    1. Continue long-acting morphine 15-mg every 8 hours for your abdominal pain    2. You're moving your bowels regularly with senna and Miralax    3. You are doing well with your ability to do things for yourself with Belle's help!  -You walk with a cane or a walker to help prevent falls.  You haven't had any falls since your last visit here  -You have a wheelchair you can use when you go outside of your home  -You feed yourself and help Rios Reynaga clean up after dinner by drying the dishes  -You give yourself sponge baths every day with your aide or Rios Reynaga close by in case you need any help  -You are able to get to the bathroom when you need to go and you don't have any accidents  -Rios Reynaga helps to organize your medications for you  -You have an aide in your home to be with you when Rios Reynaga is at work  -Jeancarlos Su and Rioselle Buiy continue to go out twice a week for chores and for fun      This is what you have shared with us about Advance Care Planning:    Primary Decision Maker ([place] Agent):Belle Farley  Relationship to patient:Cousin  Phone number:[phone]  [x] Named in a scanned document   [] Legal Next of Kin  [] Guardian     Secondary Decision Maker ([facility] ):   Relationship to patient:  Phone number:  [] Named in a scanned document   [] Legal Next of Kin  [] Guardian     ACP documents you current have include:  [x] Advance Directive or Living Will  [x] Durable Do Not Resuscitate  [] Physician Orders for Scope of Treatment (POST)  [] Medical Power of   [] Other      The Palliative Medicine Team is here to support you and your family. We will see you again in 12 weeks. Sincerely,      Sury Bolivar MD and the Palliative Medicine Team energy is good!

## 2019-03-04 ENCOUNTER — TELEPHONE (OUTPATIENT)
Dept: PALLATIVE CARE | Age: 84
End: 2019-03-04

## 2019-03-04 NOTE — TELEPHONE ENCOUNTER
Elaine Obregon called to r/s appt from 5/22 to 5/15/19 due to prior commitment. Appt now 5/15/19 at 9:30am at [facility].

## 2019-04-24 ENCOUNTER — OFFICE VISIT (OUTPATIENT)
Dept: INTERNAL MEDICINE CLINIC | Age: 84
End: 2019-04-24

## 2019-04-24 VITALS
HEIGHT: 62 IN | RESPIRATION RATE: 18 BRPM | BODY MASS INDEX: 31.1 KG/M2 | DIASTOLIC BLOOD PRESSURE: 53 MMHG | WEIGHT: 169 LBS | SYSTOLIC BLOOD PRESSURE: 134 MMHG | OXYGEN SATURATION: 93 % | HEART RATE: 80 BPM | TEMPERATURE: 98.5 F

## 2019-04-24 DIAGNOSIS — C25.9 MALIGNANT NEOPLASM OF PANCREAS, UNSPECIFIED LOCATION OF MALIGNANCY (HCC): Primary | ICD-10-CM

## 2019-04-24 DIAGNOSIS — L30.9 DERMATITIS: ICD-10-CM

## 2019-04-24 NOTE — PROGRESS NOTES
Alex Burnham is a 80 y.o. female Chief Complaint Patient presents with  Medication Evaluation  
  follow up Health Maintenance Due Topic Date Due  GLAUCOMA SCREENING Q2Y  06/27/1988  Bone Densitometry (Dexa) Screening  06/27/1988 HPI Pt presents for 6 month routine follow up. Seeing Palliative medicine regularly and doing well. L elbow has been itchy this week. Would like this checked. Has not been using lotion regularly. No new exposures. No pain in the area. No discharge/fever/exudate. Has stopped doing morning exercises recently. No particular reason. Plans to restart. Review of Systems Constitutional: Negative for fever. Respiratory: Negative for cough and shortness of breath. Cardiovascular: Negative for chest pain and palpitations. Skin: Positive for itching. Negative for rash. Neurological: Negative for dizziness, loss of consciousness and weakness. Physical Exam  
Constitutional: She is oriented to person, place, and time. She appears well-developed and well-nourished. No distress. HENT:  
Head: Normocephalic and atraumatic. Neck: Neck supple. No JVD present. No bruit bilateral carotid arteries. Cardiovascular: Normal rate, regular rhythm and normal heart sounds. Pulmonary/Chest: Effort normal and breath sounds normal. No respiratory distress. Musculoskeletal: She exhibits no edema. Neurological: She is alert and oriented to person, place, and time. Skin: Skin is warm and dry. L dorsal arm below elbow slightly red and irritated. Skin is dry in the area. Area of irritation is where arm rests on her wheelchair. Psychiatric: She has a normal mood and affect. Her behavior is normal. Judgment and thought content normal.  
Nursing note and vitals reviewed. Diagnoses and all orders for this visit: 
 
1. Malignant neoplasm of pancreas, unspecified location of malignancy ([facility] [address] Continue routine follow ups with palliative medicine. Discussed importance of daily low impact exercise routine/stretching and deep breathing every morning. 2. Dermatitis OTC Hydrocortisone this week. Daily body lotion.

## 2019-04-24 NOTE — PROGRESS NOTES
1. Have you been to the ER, urgent care clinic since your last visit? Hospitalized since your last visit? No 
 
2. Have you seen or consulted any other health care providers outside of the [address] [place] [address] since your last visit? Include any pap smears or colon screening. No  
Chief Complaint Patient presents with  Medication Evaluation  
  follow up Not fasting

## 2019-05-10 ENCOUNTER — OFFICE VISIT (OUTPATIENT)
Dept: INTERNAL MEDICINE CLINIC | Age: 84
End: 2019-05-10

## 2019-05-10 VITALS
HEART RATE: 77 BPM | HEIGHT: 62 IN | TEMPERATURE: 98.6 F | OXYGEN SATURATION: 95 % | SYSTOLIC BLOOD PRESSURE: 115 MMHG | DIASTOLIC BLOOD PRESSURE: 43 MMHG | BODY MASS INDEX: 31.1 KG/M2 | WEIGHT: 169 LBS

## 2019-05-10 DIAGNOSIS — M25.561 ACUTE PAIN OF RIGHT KNEE: Primary | ICD-10-CM

## 2019-05-10 NOTE — PROGRESS NOTES
Franci Echevarria is a 80 y.o. female Chief Complaint Patient presents with  Fall  
  evaluate knee, and breast area Health Maintenance Due Topic Date Due  GLAUCOMA SCREENING Q2Y  06/27/1988  Bone Densitometry (Dexa) Screening  06/27/1988 HPI Fall while coming in from outside on Sunday 5/5/19. No trip hazards, and fell while using cane. Pt's caregiver notes that she usually walks behind pt to support if necessary, but that time was walking in front of her. As she fell, pt reached out for a a lightweight clothes rack, which then fell on top of patient when she fell to the ground. No head trauma. Caregiver's adult son was able to lift pt up and carry her to the bedroom. Pt's caregiver checked her for signs of trauma and found none at the time. Pt didn't want to go to store or out to dinner due to R knee pain for a few days after the fall. R knee was somewhat swollen earlier this week, but now only a faint yellow bruise remains. Knee remains somewhat tender to manipulation, but pt and her caregiver were able to go to [employer] using only a cane as usual on Thursday. Review of Systems Constitutional: Negative for fever and malaise/fatigue. Musculoskeletal:  
     R knee pain Neurological: Negative for dizziness, tremors, speech change, focal weakness, loss of consciousness, weakness and headaches. Psychiatric/Behavioral: The patient is not nervous/anxious. Physical Exam  
Constitutional: She is oriented to person, place, and time. She appears well-developed and well-nourished. No distress. HENT:  
Head: Normocephalic and atraumatic. Neck: Neck supple. No JVD present. No bruit bilateral carotid arteries. Cardiovascular: Normal rate, regular rhythm and normal heart sounds. Pulmonary/Chest: Effort normal and breath sounds normal. No respiratory distress. Musculoskeletal: She exhibits no edema. R knee moderately tender to manipulation, but knee is stable on exam. No swelling noted. Neurological: She is alert and oriented to person, place, and time. Skin: Skin is warm and dry. Faint yellow bruise noted R anterior knee. Psychiatric: She has a normal mood and affect. Her behavior is normal. Judgment and thought content normal.  
Nursing note and vitals reviewed. Diagnoses and all orders for this visit: 
 
1. Acute pain of right knee Continue normal activities as tolerated. Continue pain meds from palliative medicine. Continue OTC Aspercreme PRN. Call for xray order INI 2 weeks.

## 2019-05-15 ENCOUNTER — OFFICE VISIT (OUTPATIENT)
Dept: PALLATIVE CARE | Age: 84
End: 2019-05-15

## 2019-05-15 VITALS
BODY MASS INDEX: 31.14 KG/M2 | TEMPERATURE: 99 F | RESPIRATION RATE: 16 BRPM | HEIGHT: 62 IN | HEART RATE: 75 BPM | DIASTOLIC BLOOD PRESSURE: 65 MMHG | WEIGHT: 169.2 LBS | OXYGEN SATURATION: 92 % | SYSTOLIC BLOOD PRESSURE: 138 MMHG

## 2019-05-15 DIAGNOSIS — R10.84 ABDOMINAL PAIN, GENERALIZED: ICD-10-CM

## 2019-05-15 DIAGNOSIS — K86.89 PANCREATIC MASS: ICD-10-CM

## 2019-05-15 DIAGNOSIS — K59.03 DRUG-INDUCED CONSTIPATION: ICD-10-CM

## 2019-05-15 DIAGNOSIS — R41.3 MEMORY DIFFICULTIES: ICD-10-CM

## 2019-05-15 DIAGNOSIS — R26.81 GAIT INSTABILITY: ICD-10-CM

## 2019-05-15 DIAGNOSIS — M25.561 RIGHT KNEE PAIN, UNSPECIFIED CHRONICITY: Primary | ICD-10-CM

## 2019-05-15 RX ORDER — MORPHINE SULFATE 15 MG/1
15 TABLET, FILM COATED, EXTENDED RELEASE ORAL EVERY 8 HOURS
Qty: 90 TAB | Refills: 0 | Status: SHIPPED | OUTPATIENT
Start: 2019-05-27 | End: 2019-05-15 | Stop reason: SDUPTHER

## 2019-05-15 RX ORDER — MORPHINE SULFATE 15 MG/1
15 TABLET, FILM COATED, EXTENDED RELEASE ORAL EVERY 8 HOURS
Qty: 90 TAB | Refills: 0 | Status: SHIPPED | OUTPATIENT
Start: 2019-06-25 | End: 2019-05-15 | Stop reason: SDUPTHER

## 2019-05-15 RX ORDER — MORPHINE SULFATE 15 MG/1
15 TABLET, FILM COATED, EXTENDED RELEASE ORAL EVERY 8 HOURS
Qty: 90 TAB | Refills: 0 | Status: SHIPPED | OUTPATIENT
Start: 2019-07-24 | End: 2019-08-27 | Stop reason: SDUPTHER

## 2019-05-15 NOTE — PATIENT INSTRUCTIONS
Dear Flori Spence ,    It was a pleasure seeing you in the Palliative Medicine Office today. Dear Flori Spence ,    It was a pleasure seeing you today in . [address]. Your stated goal: staying active! Your described symptoms were: Fatigue: 0 Drowsiness: 0   Depression: 0 Pain: 0   Anxiety: 0 Nausea: 0   Anorexia: 0 Dyspnea: 0   Best Well-Bein Constipation: No   Other Problem (Comment): 0       This is the plan we talked about:    1. Continue long-acting morphine 15-mg every 8 hours for your abdominal pain    2. You're moving your bowels regularly with senna and Miralax    3. Continue aspercreme on your knees as needed. 4. You can also use ice packs as needed on your knees for extra pain relief. 5. You had a fall recently as you were walking into your home. Jey Higinio is walking behind you now as a precaution in case you lose your balance again. 6. Keep up with all the activities you're doing! This is what you have shared with us about Advance Care Planning:    Primary Decision Maker (Health Care Agent):Belle Clemens  Relationship to patient:Cousin  Phone [phone]  [x] Named in a scanned document   [] Legal Next of Kin  [] Guardian     Secondary Decision Maker ([address]):   Relationship to patient:  Phone number:  [] Named in a scanned document   [] Legal Next of Kin  [] Guardian     ACP documents you current have include:  [x] Advance Directive or Living Will  [x] Durable Do Not Resuscitate  [] Physician Orders for Scope of Treatment (POST)  [] Medical Power of   [] Other      The Palliative Medicine Team is here to support you and your family. We will see you again in 12 weeks. Sincerely,      Marlen Suazo MD and the Palliative Medicine Team energy is good!

## 2019-05-15 NOTE — PROGRESS NOTES
Palliative Medicine Outpatient Services  [address] [place] [address]: [phone])    Patient Name: George Scruggs  YOB: 1923    Date of Current Visit: 05/15/19  Location of Current Visit:    [x] [facility] Office  [] [address] Office  [] [facility] Office  [] Home  [] Other:      Date of Initial Visit: 2/28/18   Requesting Physician: Carter Montana PA-C  Primary Care Physician: Dominguez Shah PA-C      SUMMARY:   George Scruggs is a 80y.o. year old with a past history of pancreatic mass by CT suspicious for malignancy, who was referred to Palliative Medicine by Ms. Vipul Lowry for symptom management and supportive care. She was hospitalized in [place] in 7/2017 with decreased appetite, functional decline and abdominal pain. 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 She declined further work-up (no biopsy). She was given a presumptive diagnosis of pancreatic cancer. She was discharged to live with her closest living relative, 2nd cousin/POA, Estefanía Small, and was followed by [facility] until recently when  she was discharged from hospice for failure to decline. She's thrived since hospital discharge, maintaining her functional abilities and with a significant weight gain. The patients social history includes: she is . She has no children. She has no living siblings. She lives in [address] [place] [address] with her second cousin/POA, Estefanía Small, and her beloved Ashley Tellez. Palliative Medicine is addressing the following current patient/family concerns: abdominal pain, mild fatigue, constipation, dementia, maintaining function and quality of life. PALLIATIVE DIAGNOSES:       ICD-10-CM ICD-9-CM    1. Right knee pain, unspecified chronicity M25.561 719.46    2.  Abdominal pain, generalized R10.84 789.07 morphine CR (MS CONTIN) 15 mg CR tablet      DISCONTINUED: morphine CR (MS CONTIN) 15 mg CR tablet      DISCONTINUED: morphine CR (MS CONTIN) 15 mg CR tablet   3. Gait instability R26.81 781.2    4. Drug-induced constipation K59.03 564.09      E980.5    5. Memory difficulties R41.3 780.93    6. Pancreatic mass K86.9 577.9           PLAN:   Patient Instructions     Dear Melo Camacho ,    It was a pleasure seeing you in the Palliative Medicine Office today. Dear Melo Camacho ,    It was a pleasure seeing you today in [address]. Your stated goal: staying active! Your described symptoms were: Fatigue: 0 Drowsiness: 0   Depression: 0 Pain: 0   Anxiety: 0 Nausea: 0   Anorexia: 0 Dyspnea: 0   Best Well-Bein Constipation: No   Other Problem (Comment): 0       This is the plan we talked about:    1. Continue long-acting morphine 15-mg every 8 hours for your abdominal pain    2. You're moving your bowels regularly with senna and Miralax    3. Continue aspercreme on your knees as needed. 4. You can also use ice packs as needed on your knees for extra pain relief. 5. You had a fall recently as you were walking into your home. Pradeep Sidhu is walking behind you now as a precaution in case you lose your balance again. 6. Keep up with all the activities you're doing! This is what you have shared with us about Advance Care Planning:    Primary Decision Maker (Health Care Agent):Belle Sanchez  Relationship to patient:Cousin  Phone [phone]  [x] Named in a scanned document   [] Legal Next of Kin  [] Guardian     Secondary Decision Maker ([address]):   Relationship to patient:  Phone number:  [] Named in a scanned document   [] Legal Next of Kin  [] Guardian     ACP documents you current have include:  [x] Advance Directive or Living Will  [x] Durable Do Not Resuscitate  [] Physician Orders for Scope of Treatment (POST)  [] Medical Power of   [] Other      The Palliative Medicine Team is here to support you and your family.      We will see you again in  weeks.    Sincerely,      Ignacio Wilson MD and the Palliative Medicine Team energy is good! Counseling and Coordination: note routed to [address] / TREATMENT PREFERENCES:   [====Goals of Care====]  GOALS OF CARE:  Patient / health care proxy stated goals: maintenance of quality of life      TREATMENT PREFERENCES:   Code Status:  [] Attempt Resuscitation       [x] Do Not Attempt Resuscitation      Primary Decision MakerRocky Sánchez - Other Relative - [phone]  Advance Care Planning:  Advance Care Planning 5/15/2019   Patient's Healthcare Decision Maker is: Named in scanned ACP document   Primary Decision Maker Name -   Primary Decision Maker Phone Number -   Primary Decision Maker Relationship to Patient -   Confirm Advance Directive Yes, on file   Does the patient have other document types Do Not Resuscitate       Other:  (If patient appropriate for POST, consider using PALLPOST smart phrase here)    The palliative care team has discussed with patient / health care proxy about goals of care / treatment preferences for patient.  [====Goals of Care====]         PRESCRIPTIONS GIVEN:     Medications Ordered Today   Medications    DISCONTD: morphine CR (MS CONTIN) 15 mg CR tablet     Sig: Take 1 Tab by mouth every eight (8) hours for 30 days. Max Daily Amount: 45 mg. Dispense:  90 Tab     Refill:  0    DISCONTD: morphine CR (MS CONTIN) 15 mg CR tablet     Sig: Take 1 Tab by mouth every eight (8) hours for 30 days. Max Daily Amount: 45 mg. Dispense:  90 Tab     Refill:  0    morphine CR (MS CONTIN) 15 mg CR tablet     Sig: Take 1 Tab by mouth every eight (8) hours for 30 days. Max Daily Amount: 45 mg.      Dispense:  90 Tab     Refill:  0           FOLLOW UP:     Future Appointments   Date Time Provider Getachew Pearson   10/23/2019  8:00 AM [place], [facility] IN CARE:   Patient Care Team:  Isabel Dubois PA-C as PCP - General (Physician Assistant)  Todd Monzon MD as Physician (Palliative Medicine)       HISTORY:   Nursing documentation from date of visit reviewed. Reviewed patient-completed ESAS and advance care planning form. Reviewed patient record in prescription monitoring program.    CHIEF COMPLAINT: knee pain      HPI/SUBJECTIVE:    The patient is: [x] Verbal / [] Nonverbal (most history obtained from primary caregiver, Noah Santiago, due to memory difficulties)    She fell about 2 weeks ago as she was walking from the car into her house. She fell on her right side. Her knee started hurting the next day, she wouldn't get up to walk into the kitchen and didn't want to go out to eat. Noah Santiago starting using aspercreme on the knee. She did go out a few days later to [employer]. Noah Santiago took her in to see Jacey Chandra just to get her checked out and she felt everything was OK. She asked Noah Santiago to bring her back in if the pain didn't improve after 2 weeks. She still has some mild soreness but, overall, the pain is much better and she's back to doing all the activities she did before the fall. She's had no abdominal pain. She continues to take morphine three times a day. She's moving her bowels every day with senna. Her appetite is good. Her weight has been stable. She was worried about the weight she gained after moving in with Noah Santiago but this slowed down. Her memory is about the same. Noah Santiago has noticed no additional problems with memory. She walks with her walker at all times. She's had no falls aside from the fall two weeks ago. She dresses with help. She helps out around the house, continues to set the table for dinner,    She sleeps well at night. She watches TV and reads the newspaper every day. She and Noah Santiago usually go out twice a week, once for chores like grocery shopping and once for fun like going to the movies or the [employer].       Clinical Pain Assessment (nonverbal scale for nonverbal patients): [++++ Clinical Pain Assessment++++]  [++++Pain Severity++++]: Pain: 0  [++++Pain Character++++]:  [++++Pain Duration++++]:  [++++Pain Effect++++]:  [++++Pain Factors++++]:  [++++Pain Frequency++++]:  [++++Pain Location++++]:  [++++ Clinical Pain Assessment++++]       FUNCTIONAL ASSESSMENT:     Palliative Performance Scale (PPS):  PPS: 70       PSYCHOSOCIAL/SPIRITUAL SCREENING:     Any spiritual / Nondenominational concerns:  [] Yes /  [x] No    Caregiver Burnout:  [] Yes /  [x] No /  [] No Caregiver Present      Anticipatory grief assessment:   [] Normal  / [] Maladaptive       ESAS Anxiety: Anxiety: 0    ESAS Depression: Depression: 0       REVIEW OF SYSTEMS:     The following systems were [x] reviewed / [] unable to be reviewed  Systems: constitutional, ears/nose/mouth/throat, respiratory, gastrointestinal, genitourinary, musculoskeletal, integumentary, neurologic, psychiatric, endocrine. Positive findings noted below. Modified ESAS Completed by: provider   Fatigue: 0 Drowsiness: 0   Depression: 0 Pain: 0   Anxiety: 0 Nausea: 0   Anorexia: 0 Dyspnea: 0   Best Well-Bein Constipation: No   Other Problem (Comment): 0          PHYSICAL EXAM:     Wt Readings from Last 3 Encounters:   05/15/19 169 lb 3.2 oz (76.7 kg)   05/10/19 169 lb (76.7 kg)   19 169 lb (76.7 kg)     Blood pressure 138/65, pulse 75, temperature 99 °F (37.2 °C), temperature source Oral, resp. rate 16, height 5' 2\" (1.575 m), weight 169 lb 3.2 oz (76.7 kg), SpO2 92 %.   Last bowel movement: See Nursing Note    Constitutional: sitting in wheelchair, appears relaxed, Alvera Creeks at side  Eyes: pupils equal, anicteric  ENMT: no nasal discharge, moist mucous membranes; HARD OF HEARING  Cardiovascular: regular, rate and rhythm; I/VI MANUEL RUSB (non-radiating); trace pedal and ankle edema  Respiratory: breathing not labored, symmetric  Gastrointestinal: soft non-tender, +bowel sounds  Musculoskeletal: no deformity; no effusion, warmth, tenderness right knee  Skin: warm, dry  Neurologic: alert, oriented to name, relates stories about her pets, following commands, moving all extremities  Psychiatric: full affect, no hallucinations  Other:       HISTORY:     Past Medical History:   Diagnosis Date    Cancer ([address]     Pancreatic    Dry eyes     Hypertension, essential     Migraine       No past surgical history on file. No family history on file. History reviewed, no pertinent family history. Social History     Tobacco Use    Smoking status: Never Smoker    Smokeless tobacco: Never Used   Substance Use Topics    Alcohol use: No     No Known Allergies   Current Outpatient Medications   Medication Sig    [START ON 7/24/2019] morphine CR (MS CONTIN) 15 mg CR tablet Take 1 Tab by mouth every eight (8) hours for 30 days. Max Daily Amount: 45 mg.    aspirin delayed-release 81 mg tablet Take  by mouth daily as needed for Pain.  senna-docusate (SENNA PLUS) 8.6-50 mg per tablet Take 2 Tabs by mouth two (2) times a day.  DULCOLAX, BISACODYL, PO Take 0.5 Caps by mouth daily as needed. No current facility-administered medications for this visit.            LAB DATA REVIEWED:     No results found for: WBC, HGB, PLT, HGBEXT, PLTEXT, HGBEXT, PLTEXT  No results found for: NA, K, CL, CO2, BUN, CREA, CA, MG, PHOS   No results found for: SGOT, GPT, AP, TBIL, TP, ALB, GLOB, GGT  No results found for: INR, PTMR, PTP, PT1, PT2, APTT   No results found for: IRON, FE, TIBC, IBCT, PSAT, FERR        CONTROLLED SUBSTANCES SAFETY ASSESSMENT (IF ON CONTROLLED SUBSTANCES):     Reviewed opioid safety handout:  [x] Yes   [] No  24 hour opioid dose >150mg morphine equivalent/day:  [] Yes   [x] No  Benzodiazepines:  [] Yes   [x] No  Sleep apnea:  [] Yes   [x] No  Urine Toxicology Testing within last 6 months:  [] Yes   [x] No  History of or new aberrant medication taking behaviors:  [] Yes   [x] No  Narcan prescribed:  [] Yes   [x] No          Total time:   Counseling / coordination time:   > 50% counseling / coordination?:

## 2019-05-15 NOTE — PROGRESS NOTES
Palliative Medicine Office Visit  Palliative Medicine Nurse Check In  [phone]    Patient Name: Flori Spence  YOB: 1923      Date of Office Visit: 5/15/2019      Patient states: Patient fell 2 weeks  ago at home bumped right knee. She C/O some arthritic pain however it's about the same. From Check In Sheet (scanned in Media):  Has a medical provider talked with you about cardiopulmonary resuscitation (CPR)? [x] Yes   [] No   [] Unable to obtain    Nurse reminder to complete or update ACP FlowSheet:    Is ACP on the Problem List?    [x] Yes    [] No  IF ACP Document is ON FILE; Nurse to place ACP on Problem List     Is there an ACP Note in Chart Review/Note? [x] Yes    [] No   If NO: [address] Planning 2/27/2019   Patient's Healthcare Decision Maker is: Named in scanned ACP document   Primary Decision Maker Name -   Primary Decision Maker Phone Number -   Primary Decision Maker Relationship to Patient -   Confirm Advance Directive Yes, on file   Does the patient have other document types Do Not Resuscitate       Primary Decision MakerDanju Folds - Other Relative - [phone]  Advance Care Planning 5/15/2019   Patient's Healthcare Decision Maker is: Named in scanned ACP document   Primary Decision Maker Name -   Primary Decision Maker Phone Number -   Primary Decision Maker Relationship to Patient -   Confirm Advance Directive Yes, on file   Does the patient have other document types Do Not Resuscitate         Is there anything that we should know about you as a person in order to provide you the best care possible? no    Have you been to the ER, urgent care clinic since your last visit? [] Yes   [x] No   [] Unable to obtain    Have you been hospitalized since your last visit? [] Yes   [x] No   [] Unable to obtain    Have you seen or consulted any other health care providers outside of the [address] [place] [address] since your last visit?    [] Yes [x] No   [] Unable to obtain    Functional status (describe):     Last BM:  yesterday     accessed (date):  5/14/19    Bottle review (for opioid pain medication):  Medication 1: morphine CR (MS CONTIN) 15 mg CR tablet       Date filled: 4/30/19  Directions: Take 1 Tab by mouth every eight (8) hours for 30 days.  Max Daily Amount: 45 mg.              # filled: 90  # left: 62  # pills taking per day:3  Last dose taken:6AM    Medication 2:   Date filled:   Directions:   # filled:   # left:   # pills taking per day:  Last dose taken:    Medication 3:   Date filled:   Directions:   # filled:   # left:   # pills taking per day:  Last dose taken:    Medication 4:   Date filled:   Directions:   # filled:   # left:   # pills taking per day:  Last dose taken:

## 2019-06-25 NOTE — PATIENT INSTRUCTIONS
Dear Florentino Councilman ,    It was a pleasure seeing you in the Palliative Medicine Office today. This is what we talked about:     1. Your abdominal pain  -Your pain is under good control on your current pain medication regimen  -Continue long-acting morphine 15-mg every 8 hours  -You've been taking this for over a year and it's working well for you without any side-effects  -You are not using any short-acting opioid pain medicines at this time  -Gabriela Davis very occasionally gives you a baby aspirin when you have other types of pain like a headache    2. Your bowels  -You've been having regular bowel movements without need for laxatives or stool softener  -Continue senna or dulcolax as needed    3. Your fatigue  -You have occasional mild fatigue  -You and Gabriela Davis continue to enjoy several outings a week    4. Your appetite  -Your appetite is good!  -You enjoy several snacks a day  -You are concerned about your weight gain  -I reviewed your weights and your medical history  -I'm not concerned about your weight  -I encourage you to continue to enjoy your meals and your snacks! 5. Your function   -You walk with a cane or a walker to help prevent falls.  You haven't had any falls since your last visit here  -You have a wheelchair you can use when you go outside of your home  -You feed yourself and help Gabriela Davis clean up after dinner by drying the dishes  -You give yourself sponge baths every day with your aide or Gabriela Lujanlas close by in case you need any help  -You are able to get to the bathroom when you need to go and you don't have any accidents  -Gabriela Davis helps to organize your medications for you  -You have an aide in your home to be with you when Gabriela Davis is at work  -You and Gabriela Davis continue to go out twice a week for chores and for fun    This is what you have shared with us about [facility]. Planning 4/11/2018   Patient's Healthcare Decision Maker is: Named in scanned ACP document   Primary Decision Maker Name Toni Prasad is a 55 y.o. female    Chief Complaint   Patient presents with    ED Follow-up     [facility] [place]    Other     patient received a hospital bed through and hospital and it is malfunctioning. They need a new order or someone to fix it. 1. Have you been to the ER, urgent care clinic since your last visit? Hospitalized since your last visit? Yes When: 5/2019 Where: [facility] for visit: UTI    2. Have you seen or consulted any other health care providers outside of the [address] [place] [address] since your last visit? Include any pap smears or colon screening.  No Alan Goldberg   Primary Decision Maker Phone Number [phone]   Primary Decision Maker Relationship to Patient Other relative   Confirm Advance Directive Yes, on file   Does the patient have other document types Do Not Resuscitate         The Palliative Medicine Team is here to support you and your family. We will see you again in 12 weeks. Our Nurse Navigator Elton Pereyra will check in with you by phone before that time. If there are any concerns before that time, such as medication questions, worsening symptoms or a need to see a physician for an urgent or emergent situation; please call [phone] ([phone]). A physician is also on call after our normal business hours of 8am to 5pm.     In order to serve you better, please allow up to 48 hours for prescription refills to be processed. Certain medications may require more paperwork or a written prescription that you may need to  from the office. We appreciate you letting us know of any refill requests as soon as possible. We also would like you to sign up for [employer] as well.     Sincerely,      Gulshan Sorto MD and the Palliative Medicine Team

## 2019-08-01 ENCOUNTER — TELEPHONE (OUTPATIENT)
Dept: PALLATIVE CARE | Age: 84
End: 2019-08-01

## 2019-08-02 NOTE — TELEPHONE ENCOUNTER
Called Ms. Samir Evangelina  to advise/confirm upcoming appt with Dr. Jh Chandra on  8/5/19    at 9:30am at [facility]. Wagner Chandlerw confirmed date/time/location. Also advised to please bring in your Drivers License and Insurance Card with you to appointment as well as any prescription pain medication in the original container with you to appt.

## 2019-08-07 ENCOUNTER — TELEPHONE (OUTPATIENT)
Dept: PALLATIVE CARE | Age: 84
End: 2019-08-07

## 2019-08-07 NOTE — TELEPHONE ENCOUNTER
Ms. Cardozo Solders called to r/s 9/9/19 appt to 8/27/19 at [facility] for 10:30am with Dr. Danial Sainz. Provided address and directions.

## 2019-08-26 ENCOUNTER — TELEPHONE (OUTPATIENT)
Dept: PALLATIVE CARE | Age: 84
End: 2019-08-26

## 2019-08-26 NOTE — TELEPHONE ENCOUNTER
Called patient to advise/confirm upcoming appt with Dr. Javi Marrero on    8/27/19  at 10:30 am at [facility]. Michelle Perla confirmed. Also advised to please bring in your Drivers License and Insurance Card with you to appointment as well as any prescription pain medication in the original container with you to appt.

## 2019-08-27 ENCOUNTER — OFFICE VISIT (OUTPATIENT)
Dept: PALLATIVE CARE | Age: 84
End: 2019-08-27

## 2019-08-27 VITALS
TEMPERATURE: 97.9 F | WEIGHT: 160.6 LBS | RESPIRATION RATE: 16 BRPM | OXYGEN SATURATION: 92 % | DIASTOLIC BLOOD PRESSURE: 62 MMHG | SYSTOLIC BLOOD PRESSURE: 139 MMHG | HEART RATE: 81 BPM | HEIGHT: 62 IN | BODY MASS INDEX: 29.55 KG/M2

## 2019-08-27 DIAGNOSIS — G89.29 BILATERAL CHRONIC KNEE PAIN: Primary | ICD-10-CM

## 2019-08-27 DIAGNOSIS — R41.3 MEMORY DIFFICULTIES: ICD-10-CM

## 2019-08-27 DIAGNOSIS — M25.561 BILATERAL CHRONIC KNEE PAIN: Primary | ICD-10-CM

## 2019-08-27 DIAGNOSIS — M25.562 BILATERAL CHRONIC KNEE PAIN: Primary | ICD-10-CM

## 2019-08-27 DIAGNOSIS — K86.89 PANCREATIC MASS: ICD-10-CM

## 2019-08-27 DIAGNOSIS — R10.84 ABDOMINAL PAIN, GENERALIZED: ICD-10-CM

## 2019-08-27 DIAGNOSIS — R26.81 GAIT INSTABILITY: ICD-10-CM

## 2019-08-27 DIAGNOSIS — K59.03 DRUG-INDUCED CONSTIPATION: ICD-10-CM

## 2019-08-27 RX ORDER — MORPHINE SULFATE 15 MG/1
15 TABLET, FILM COATED, EXTENDED RELEASE ORAL EVERY 8 HOURS
Qty: 90 TAB | Refills: 0 | Status: SHIPPED | OUTPATIENT
Start: 2019-10-24 | End: 2019-11-19 | Stop reason: SDUPTHER

## 2019-08-27 RX ORDER — MORPHINE SULFATE 15 MG/1
15 TABLET, FILM COATED, EXTENDED RELEASE ORAL EVERY 8 HOURS
Qty: 90 TAB | Refills: 0 | Status: SHIPPED | OUTPATIENT
Start: 2019-08-27 | End: 2019-08-27 | Stop reason: SDUPTHER

## 2019-08-27 RX ORDER — MORPHINE SULFATE 15 MG/1
15 TABLET, FILM COATED, EXTENDED RELEASE ORAL EVERY 8 HOURS
Qty: 90 TAB | Refills: 0 | Status: SHIPPED | OUTPATIENT
Start: 2019-09-25 | End: 2019-08-27 | Stop reason: SDUPTHER

## 2019-08-27 NOTE — PROGRESS NOTES
Palliative Medicine Outpatient Services  [address] [place] [address]: [phone] ([phone])    Patient Name: Omid Griffith  YOB: 1923    Date of Current Visit: 08/27/19  Location of Current Visit:    [x] [facility] Office  [] [facility] Office  [] [facility] Office  [] Home  [] Other:      Date of Initial Visit: 2/28/18   Requesting Physician: Chey Michelle PA-C  Primary Care Physician: Leighann Gonzales PA-C      SUMMARY:   Omid Griffith is a 80y.o. year old with a past history of pancreatic mass by CT suspicious for malignancy, who was referred to Palliative Medicine by Ms. La Pickett for symptom management and supportive care. She was hospitalized in [place] in 7/2017 with decreased appetite, functional decline and abdominal pain. 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 She declined further work-up (no biopsy). She was given a presumptive diagnosis of pancreatic cancer. She was discharged to live with her closest living relative, 2nd cousin/POA, Mimi Hays, and was followed by [facility] until recently when  she was discharged from hospice for failure to decline. She's thrived since hospital discharge, maintaining her functional abilities and with a significant weight gain. The patients social history includes: she is . She has no children. She has no living siblings. She lives in [address] [place] [address] with her second cousin/POA, Mimi Hays, and her beloved Andi Gonzalez and their [place], [place]. Palliative Medicine is addressing the following current patient/family concerns: abdominal pain, mild fatigue, constipation, dementia, maintaining function and quality of life. PALLIATIVE DIAGNOSES:       ICD-10-CM ICD-9-CM    1. Bilateral chronic knee pain M25.561 719.46     M25.562 338.29     G89.29     2.  Abdominal pain, generalized R10.84 789.07 morphine CR (MS CONTIN) 15 mg CR tablet      DISCONTINUED: morphine CR (MS CONTIN) 15 mg CR tablet      DISCONTINUED: morphine CR (MS CONTIN) 15 mg CR tablet   3. Drug-induced constipation K59.03 564.09      E980.5    4. Gait instability R26.81 781.2    5. Memory difficulties R41.3 780.93    6. Pancreatic mass K86.9 577.9           PLAN:   Patient Instructions     Dear Pilar Trevino ,    It was a pleasure seeing you today in [facility]. Your knee pain continues to flare from time to time. You're now using your walker at all times to avoid falling. You haven't had any falls in the last few months. You've had no abdominal pain. Your appetite is good! You've been moving your bowels regularly except for the past week which may be related to your inactivity. We will see you again in 12 weeks. If labs or imaging tests have been ordered for you today, please call the office  at [phone] 48 hours after completion to obtain the results. Your stated goal: continue being active! Your described symptoms were: Fatigue: 0 Drowsiness: 0   Depression: 0 Pain: 1   Anxiety: 0 Nausea: 0   Anorexia: 0 Dyspnea: 0   Best Well-Bein Constipation: Yes   Other Problem (Comment): 0       This is the plan we talked about:    1. Continue morphine 15-mg every 8 hours for your abdominal pain. 2. You've been taking morphine for over a year. This is working well to control your pain and you've had no adverse side effects. 3. Continue aspercreme on your knees as needed. 4. Continue using Salonpas patches as needed. 5. Continue taking baby aspirin daily. This may help with the inflammation associated with arthritis. 5. You can also take tylenol arthritis 650-mg three times a day as needed. It's safe to use tylenol with the baby aspirin as these medicines work by different mechanisms. .    6. Continue senna as needed for your constipation. 7. Continue using your walker for safety.       This is what you have shared with us about Advance Care Planning: Primary Decision Maker: Belle Dawkins - Other Relative - [phone]  Advance Care Planning 8/27/2019   Patient's Healthcare Decision Maker is: Named in scanned ACP document   Primary Decision Maker Name -   Primary Decision Maker Phone Number -   Primary Decision Maker Relationship to Patient -   Confirm Advance Directive Yes, on file   Does the patient have other document types Do Not Resuscitate           The Palliative Medicine Team is here to support you and your family. Sincerely,      Jacquelyn Cm MD and the Palliative Medicine Team          Counseling and Coordination: note routed to Rocky LIAO        [address] / TREATMENT PREFERENCES:   [====Goals of Care====]  GOALS OF CARE:  Patient / health care proxy stated goals: maintenance of quality of life      TREATMENT PREFERENCES:   Code Status:  [] Attempt Resuscitation       [x] Do Not Attempt Resuscitation      Primary Decision MakerJuan Daniel Mane - Other Relative - [phone]  Advance Care Planning:  Advance Care Planning 8/27/2019   Patient's Healthcare Decision Maker is: Named in scanned ACP document   Primary Decision Maker Name -   Primary Decision Maker Phone Number -   Primary Decision Maker Relationship to Patient -   Confirm Advance Directive Yes, on file   Does the patient have other document types Do Not Resuscitate       Other:  (If patient appropriate for POST, consider using PALLPOST smart phrase here)    The palliative care team has discussed with patient / health care proxy about goals of care / treatment preferences for patient.  [====Goals of Care====]         PRESCRIPTIONS GIVEN:     Medications Ordered Today   Medications    DISCONTD: morphine CR (MS CONTIN) 15 mg CR tablet     Sig: Take 1 Tab by mouth every eight (8) hours for 30 days. Max Daily Amount: 45 mg. Dispense:  90 Tab     Refill:  0    DISCONTD: morphine CR (MS CONTIN) 15 mg CR tablet     Sig: Take 1 Tab by mouth every eight (8) hours for 30 days.  Max Daily Amount: 45 mg. Dispense:  90 Tab     Refill:  0    morphine CR (MS CONTIN) 15 mg CR tablet     Sig: Take 1 Tab by mouth every eight (8) hours for 30 days. Max Daily Amount: 45 mg. Dispense:  90 Tab     Refill:  0           FOLLOW UP:     Future Appointments   Date Time Provider Getachew Pearson   10/23/2019  8:00 AM RENE Glass IN CARE:   Patient Care Team:  Lenora Meckel as PCP - General (Physician Assistant)  Tunde Arellano MD as Physician (Palliative Medicine)       HISTORY:   Nursing documentation from date of visit reviewed. Reviewed patient-completed ESAS and advance care planning form. Reviewed patient record in prescription monitoring program.    CHIEF COMPLAINT: knee pain      HPI/SUBJECTIVE:    The patient is: [x] Verbal / [] Nonverbal (most history obtained from primary caregiver, Jason Fonseca, due to memory difficulties)    She feels good today. She's been having more knee pain. It flares up more frequently. She uses aspercreme which she applies herself when her knees hurt. Jason Fonseca will apply a Salonpas if needed. This seems to help after a few days. She hasn't had any abdominal pain, none since moving in with Jason Fonseca. She's eating well! She moves her bowels regularly most of the time but has had some trouble with constipation for the past week. Jason Fonseca gives her senna if she goes more than 2 days without a bowel movement. She uses her walker all the time, except for using her cane in the bathroom. She's had no falls. She hasn't been quite as active recently. Belle's downsizing and moving furniture around so she hasn't had the space to walk her \"laps\" around the house. She wonders if this is why her knees are hurting more. She's looking forward to a few days at [employer] this weekend.       Clinical Pain Assessment (nonverbal scale for nonverbal patients):   [++++ Clinical Pain Assessment++++]  [++++Pain Severity++++]: Pain: 1  [++++Pain Character++++]: ache  [++++Pain Duration++++]: years  [++++Pain Effect++++]: less active when pain flares  [++++Pain Factors++++]: aspercreme, salonpas helps  [++++Pain Frequency++++]: intermittent  [++++Pain Location++++]: knees  [++++ Clinical Pain Assessment++++]       FUNCTIONAL ASSESSMENT:     Palliative Performance Scale (PPS):  PPS: 70       PSYCHOSOCIAL/SPIRITUAL SCREENING:     Any spiritual / Christianity concerns:  [] Yes /  [x] No    Caregiver Burnout:  [] Yes /  [x] No /  [] No Caregiver Present      Anticipatory grief assessment:   [] Normal  / [] Maladaptive       ESAS Anxiety: Anxiety: 0    ESAS Depression: Depression: 0       REVIEW OF SYSTEMS:     The following systems were [x] reviewed / [] unable to be reviewed  Systems: constitutional, ears/nose/mouth/throat, respiratory, gastrointestinal, genitourinary, musculoskeletal, integumentary, neurologic, psychiatric, endocrine. Positive findings noted below. Modified ESAS Completed by: provider   Fatigue: 0 Drowsiness: 0   Depression: 0 Pain: 1   Anxiety: 0 Nausea: 0   Anorexia: 0 Dyspnea: 0   Best Well-Bein Constipation: Yes   Other Problem (Comment): 0          PHYSICAL EXAM:     Wt Readings from Last 3 Encounters:   19 160 lb 9.6 oz (72.8 kg)   05/15/19 169 lb 3.2 oz (76.7 kg)   05/10/19 169 lb (76.7 kg)     Blood pressure 139/62, pulse 81, temperature 97.9 °F (36.6 °C), temperature source Oral, resp. rate 16, height 5' 2\" (1.575 m), weight 160 lb 9.6 oz (72.8 kg), SpO2 92 %.   Last bowel movement: See Nursing Note    Constitutional: sitting in wheelchair, appears relaxed, [facility] at side  Eyes: pupils equal, anicteric  ENMT: no nasal discharge, moist mucous membranes; HARD OF HEARING  Cardiovascular: regular, rate and rhythm; I/VI MANUEL RUSB (non-radiating); trace pedal and ankle edema  Respiratory: breathing not labored, symmetric  Gastrointestinal: soft non-tender, +bowel sounds  Musculoskeletal: bilateral knees with no deformity; no effusion, warmth,   Skin: warm, dry  Neurologic: alert, oriented to name, relates stories about her pets, following commands, moving all extremities  Psychiatric: full affect, no hallucinations  Other:       HISTORY:     Past Medical History:   Diagnosis Date    Cancer ([address]     Pancreatic    Dry eyes     Hypertension, essential     Migraine       No past surgical history on file. No family history on file. History reviewed, no pertinent family history. Social History     Tobacco Use    Smoking status: Never Smoker    Smokeless tobacco: Never Used   Substance Use Topics    Alcohol use: No     No Known Allergies   Current Outpatient Medications   Medication Sig    aspirin delayed-release 81 mg tablet Take  by mouth daily as needed for Pain.  senna-docusate (SENNA PLUS) 8.6-50 mg per tablet Take 2 Tabs by mouth two (2) times a day.  DULCOLAX, BISACODYL, PO Take 0.5 Caps by mouth daily as needed. No current facility-administered medications for this visit.            LAB DATA REVIEWED:     No results found for: WBC, HGB, PLT, HGBEXT, PLTEXT, HGBEXT, PLTEXT  No results found for: NA, K, CL, CO2, BUN, CREA, CA, MG, PHOS   No results found for: SGOT, GPT, AP, TBIL, TP, ALB, GLOB, GGT  No results found for: INR, PTMR, PTP, PT1, PT2, APTT   No results found for: IRON, FE, TIBC, IBCT, PSAT, FERR        CONTROLLED SUBSTANCES SAFETY ASSESSMENT (IF ON CONTROLLED SUBSTANCES):     Reviewed opioid safety handout:  [x] Yes   [] No  24 hour opioid dose >150mg morphine equivalent/day:  [] Yes   [x] No  Benzodiazepines:  [] Yes   [x] No  Sleep apnea:  [] Yes   [x] No  Urine Toxicology Testing within last 6 months:  [] Yes   [x] No  History of or new aberrant medication taking behaviors:  [] Yes   [x] No  Narcan prescribed:  [] Yes   [x] No          Total time:   Counseling / coordination time:   > 50% counseling / coordination?:

## 2019-08-27 NOTE — PROGRESS NOTES
Palliative Medicine Office Visit  Palliative Medicine Nurse Check In  [phone]    Patient Name: Dannielle Mtz  YOB: 1923      Date of Office Visit: 8/27/2019      Patient states: C/O left knee pain. From Check In Sheet (scanned in Media):  Has a medical provider talked with you about cardiopulmonary resuscitation (CPR)? [x] Yes   [] No   [] Unable to obtain    Nurse reminder to complete or update ACP FlowSheet:    Is ACP on the Problem List?    [x] Yes    [] No  IF ACP Document is ON FILE; Nurse to place ACP on Problem List     Is there an ACP Note in Chart Review/Note? [x] Yes    [] No   If NO: [address] Planning 5/15/2019   Patient's Healthcare Decision Maker is: Named in scanned ACP document   Primary Decision Maker Name -   Primary Decision Maker Phone Number -   Primary Decision Maker Relationship to Patient -   Confirm Advance Directive Yes, on file   Does the patient have other document types Do Not Resuscitate       Primary Decision MakerAnlicha Fairchild - Other Relative - [phone]  Advance Care Planning 8/27/2019   Patient's Healthcare Decision Maker is: Named in scanned ACP document   Primary Decision Maker Name -   Primary Decision Maker Phone Number -   Primary Decision Maker Relationship to Patient -   Confirm Advance Directive Yes, on file   Does the patient have other document types Do Not Resuscitate         Is there anything that we should know about you as a person in order to provide you the best care possible? Have you been to the ER, urgent care clinic since your last visit? [] Yes   [x] No   [] Unable to obtain    Have you been hospitalized since your last visit? [] Yes   [x] No   [] Unable to obtain    Have you seen or consulted any other health care providers outside of the [address] [place] [address] since your last visit?    [] Yes   [x] No   [] Unable to obtain    Functional status (describe):     Last BM:  2 days ago     accessed (date):  8/26/19    Bottle review (for opioid pain medication):  Medication 1: Morphine sulf ER 15 mg tab  Date filled: 7/26/19  Directions:   Take 1 tab every 8 hrs  # filled: 90  # left: 25  # pills taking per day:3  Last dose taken: 6AM    Medication 2:   Date filled:   Directions:   # filled:   # left:   # pills taking per day:  Last dose taken:    Medication 3:   Date filled:   Directions:   # filled:   # left:   # pills taking per day:  Last dose taken:    Medication 4:   Date filled:   Directions:   # filled:   # left:   # pills taking per day:  Last dose taken:

## 2019-08-27 NOTE — PATIENT INSTRUCTIONS
Dear Omid Griffith ,    It was a pleasure seeing you today in [facility]. Your knee pain continues to flare from time to time. You're now using your walker at all times to avoid falling. You haven't had any falls in the last few months. You've had no abdominal pain. Your appetite is good! You've been moving your bowels regularly except for the past week which may be related to your inactivity. We will see you again in 12 weeks. If labs or imaging tests have been ordered for you today, please call the office  at [phone] 48 hours after completion to obtain the results. Your stated goal: continue being active! Your described symptoms were: Fatigue: 0 Drowsiness: 0   Depression: 0 Pain: 1   Anxiety: 0 Nausea: 0   Anorexia: 0 Dyspnea: 0   Best Well-Bein Constipation: Yes   Other Problem (Comment): 0       This is the plan we talked about:    1. Continue morphine 15-mg every 8 hours for your abdominal pain. 2. You've been taking morphine for over a year. This is working well to control your pain and you've had no adverse side effects. 3. Continue aspercreme on your knees as needed. 4. Continue using Salonpas patches as needed. 5. Continue taking baby aspirin daily. This may help with the inflammation associated with arthritis. 5. You can also take tylenol arthritis 650-mg three times a day as needed. It's safe to use tylenol with the baby aspirin as these medicines work by different mechanisms. .    6. Continue senna as needed for your constipation. 7. Continue using your walker for safety.       This is what you have shared with us about Advance Care Planning:      Primary Decision Maker: Markos Thompson - Other Relative - [phone]  Advance Care Planning 2019   Patient's Healthcare Decision Maker is: Named in scanned ACP document   Primary Decision Maker Name -   Primary Decision Maker Phone Number -   Primary Decision Maker Relationship to Patient -   Confirm Advance Directive Yes, on file   Does the patient have other document types Do Not Resuscitate           The Palliative Medicine Team is here to support you and your family.        Sincerely,      Felicitas Dunne MD and the Palliative Medicine Team

## 2019-09-24 ENCOUNTER — OFFICE VISIT (OUTPATIENT)
Dept: INTERNAL MEDICINE CLINIC | Age: 84
End: 2019-09-24

## 2019-09-24 VITALS
RESPIRATION RATE: 18 BRPM | SYSTOLIC BLOOD PRESSURE: 147 MMHG | OXYGEN SATURATION: 93 % | DIASTOLIC BLOOD PRESSURE: 51 MMHG | BODY MASS INDEX: 29.44 KG/M2 | TEMPERATURE: 98.2 F | HEART RATE: 81 BPM | HEIGHT: 62 IN | WEIGHT: 160 LBS

## 2019-09-24 DIAGNOSIS — Z00.00 MEDICARE ANNUAL WELLNESS VISIT, SUBSEQUENT: Primary | ICD-10-CM

## 2019-09-24 DIAGNOSIS — J30.2 SEASONAL ALLERGIC RHINITIS, UNSPECIFIED TRIGGER: ICD-10-CM

## 2019-09-24 NOTE — PROGRESS NOTES
1. Have you been to the ER, urgent care clinic since your last visit? Hospitalized since your last visit? No    2. Have you seen or consulted any other health care providers outside of the [address] [place] [address] since your last visit? Include any pap smears or colon screening.  No   Chief Complaint   Patient presents with    Cough     productive cough     Not fasting

## 2019-09-24 NOTE — PROGRESS NOTES
Ping Maravilla is a 80 y.o. female  Chief Complaint   Patient presents with    Cough     productive cough      Visit Vitals  /51 (BP 1 Location: Left arm, BP Patient Position: At rest)   Pulse 81   Temp 98.2 °F (36.8 °C) (Oral)   Resp 18   Ht 5' 2\" (1.575 m)   Wt 160 lb (72.6 kg)   SpO2 93%   BMI 29.26 kg/m²       Health Maintenance Due   Topic Date Due    GLAUCOMA SCREENING Q2Y  06/27/1988    Bone Densitometry (Dexa) Screening  06/27/1988    Influenza Age 5 to Adult  08/01/2019    MEDICARE YEARLY EXAM  09/20/2019       HPI  Hx chronic PND - previously only at night but now all day long. Pt attributes this to season change, but caregiver wants to check. Of note, pt and caregiver Elton Young) are planning to move to [facility] after she retires soon. They will be moving into a home with 2 other friends of Yaneth. They plan to keep their providers in [address] This is the Subsequent Medicare Annual Wellness Exam, performed 12 months or more after the Initial AWV or the last Subsequent AWV    I have reviewed the patient's medical history in detail and updated the computerized patient record. History     Past Medical History:   Diagnosis Date    Cancer [facility])     Pancreatic    Dry eyes     Hypertension, essential     Migraine       History reviewed. No pertinent surgical history. Current Outpatient Medications   Medication Sig Dispense Refill    [START ON 10/24/2019] morphine CR (MS CONTIN) 15 mg CR tablet Take 1 Tab by mouth every eight (8) hours for 30 days. Max Daily Amount: 45 mg. 90 Tab 0    aspirin delayed-release 81 mg tablet Take  by mouth daily as needed for Pain.  senna-docusate (SENNA PLUS) 8.6-50 mg per tablet Take 2 Tabs by mouth two (2) times a day.  DULCOLAX, BISACODYL, PO Take 0.5 Caps by mouth daily as needed. No Known Allergies  History reviewed. No pertinent family history.   Social History     Tobacco Use    Smoking status: Never Smoker    Smokeless tobacco: Never Used   Substance Use Topics    Alcohol use: No     Patient Active Problem List   Diagnosis Code    Migraine G43.909    Dry eyes H04.123    Malignant neoplasm of pancreas ([facility] [address] C25.9    Overweight (BMI 25.0-29. 9) E66.3    Hearing loss H91.90    DNR (do not resuscitate) Z66       Depression Risk Factor Screening:     3 most recent PHQ Screens 9/24/2019   Little interest or pleasure in doing things Not at all   Feeling down, depressed, irritable, or hopeless Not at all   Total Score PHQ 2 0     Alcohol Risk Factor Screening:    reports that she does not drink alcohol. Functional Ability and Level of Safety:   Hearing Loss  Hearing is impaired     Activities of Daily Living  The home contains: handrails  Patient has help with transportation, food prep, shopping, medication & money management. Fall Risk  Fall Risk Assessment, last 12 mths 9/24/2019   Able to walk? Yes   Fall in past 12 months? No   Fall with injury? -   Number of falls in past 12 months -   Fall Risk Score -       Abuse Screen  Patient is not abused    Cognitive Screening   Evaluation of Cognitive Function:  Has your family/caregiver stated any concerns about your memory: no - no change. Patient Care Team   Patient Care Team:  Scottie Maurer as PCP - General (Physician Assistant)  Teresa Gomez MD as Physician (Palliative Medicine)    Assessment/Plan   Education and counseling provided:  Are appropriate based on today's review and evaluation    Extended Emergency Contact Information  Primary Emergency Contact: [address] Phone: [phone]  Relation: Other Relative  Secondary Emergency Contact: [facility] Phone: [phone]  Relation: Other Relative    Review of Systems   Constitutional: Negative for fever and malaise/fatigue. HENT: Positive for congestion. Negative for ear pain. Eyes: Positive for discharge.    Respiratory: Negative for cough, sputum production and shortness of breath. Skin: Negative for rash. Neurological: Positive for headaches. Endo/Heme/Allergies: Positive for environmental allergies. Physical Exam   Constitutional: She is oriented to person, place, and time. She appears well-developed and well-nourished. No distress. HENT:   Head: Normocephalic and atraumatic. Mouth/Throat: Oropharynx is clear and moist.   Bilateral TMs with fluid. No erythema. Nasal mucosa pale, inflamed. Eyes: Conjunctivae are normal.   Neck: Neck supple. Cardiovascular: Normal rate, regular rhythm and normal heart sounds. Pulmonary/Chest: Effort normal and breath sounds normal.   Lymphadenopathy:     She has no cervical adenopathy. Neurological: She is alert and oriented to person, place, and time. Skin: Skin is warm and dry. Nursing note and vitals reviewed. Diagnoses and all orders for this visit:    1. Medicare annual wellness visit, subsequent    2. Seasonal allergic rhinitis, unspecified trigger    Mucinex OTC daily PRN. If sx worsen in 1-2 weeks, then call for AB.

## 2019-09-26 ENCOUNTER — TELEPHONE (OUTPATIENT)
Dept: INTERNAL MEDICINE CLINIC | Age: 84
End: 2019-09-26

## 2019-09-27 RX ORDER — AMOXICILLIN 875 MG/1
875 TABLET, FILM COATED ORAL 2 TIMES DAILY
Qty: 20 TAB | Refills: 0 | Status: SHIPPED | OUTPATIENT
Start: 2019-09-27 | End: 2020-01-01

## 2019-09-27 NOTE — TELEPHONE ENCOUNTER
Spoke with Josh Trivedi HIPAA verified . advised that medication has been sent to pharmacy.  Verbalized understanding

## 2019-10-16 ENCOUNTER — TELEPHONE (OUTPATIENT)
Dept: PALLATIVE CARE | Age: 84
End: 2019-10-16

## 2019-11-14 ENCOUNTER — TELEPHONE (OUTPATIENT)
Dept: PALLATIVE CARE | Age: 84
End: 2019-11-14

## 2019-11-14 NOTE — TELEPHONE ENCOUNTER
Called patient to advise/confirm upcoming appt with Dr. Van Staples on 11/19/19     at 10:30am at [facility]. Uvaldo Lombardo confirmed. Also advised to please bring in your Drivers License and Insurance Card with you to appointment as well as any prescription pain medication in the original container with you to appt.

## 2019-11-19 ENCOUNTER — OFFICE VISIT (OUTPATIENT)
Dept: PALLATIVE CARE | Age: 84
End: 2019-11-19

## 2019-11-19 VITALS
HEIGHT: 62 IN | WEIGHT: 170.6 LBS | TEMPERATURE: 97.3 F | OXYGEN SATURATION: 93 % | BODY MASS INDEX: 31.39 KG/M2 | SYSTOLIC BLOOD PRESSURE: 146 MMHG | RESPIRATION RATE: 16 BRPM | HEART RATE: 67 BPM | DIASTOLIC BLOOD PRESSURE: 64 MMHG

## 2019-11-19 DIAGNOSIS — G89.29 BILATERAL CHRONIC KNEE PAIN: Primary | ICD-10-CM

## 2019-11-19 DIAGNOSIS — M25.562 BILATERAL CHRONIC KNEE PAIN: Primary | ICD-10-CM

## 2019-11-19 DIAGNOSIS — R10.84 ABDOMINAL PAIN, GENERALIZED: ICD-10-CM

## 2019-11-19 DIAGNOSIS — K59.03 DRUG-INDUCED CONSTIPATION: ICD-10-CM

## 2019-11-19 DIAGNOSIS — R26.81 GAIT INSTABILITY: ICD-10-CM

## 2019-11-19 DIAGNOSIS — M25.561 BILATERAL CHRONIC KNEE PAIN: Primary | ICD-10-CM

## 2019-11-19 DIAGNOSIS — R41.3 MEMORY DIFFICULTIES: ICD-10-CM

## 2019-11-19 RX ORDER — MORPHINE SULFATE 15 MG/1
15 TABLET, FILM COATED, EXTENDED RELEASE ORAL EVERY 8 HOURS
Qty: 90 TAB | Refills: 0 | Status: SHIPPED | OUTPATIENT
Start: 2020-01-29 | End: 2020-01-01 | Stop reason: SDUPTHER

## 2019-11-19 RX ORDER — MORPHINE SULFATE 15 MG/1
15 TABLET, FILM COATED, EXTENDED RELEASE ORAL EVERY 8 HOURS
Qty: 90 TAB | Refills: 0 | Status: SHIPPED | OUTPATIENT
Start: 2019-11-30 | End: 2019-11-19 | Stop reason: SDUPTHER

## 2019-11-19 RX ORDER — MORPHINE SULFATE 15 MG/1
15 TABLET, FILM COATED, EXTENDED RELEASE ORAL EVERY 8 HOURS
Qty: 90 TAB | Refills: 0 | Status: SHIPPED | OUTPATIENT
Start: 2019-12-30 | End: 2019-11-19 | Stop reason: SDUPTHER

## 2019-11-19 NOTE — PATIENT INSTRUCTIONS
Dear Keri Connolly ,    It was a pleasure seeing you today in [facility]. We will see you again in 12 weeks. We will schedule an early afternoon appointment for you. If labs or imaging tests have been ordered for you today, please call the office  at [phone] 48 hours after completion to obtain the results. Your stated goal: continue being active! Your described symptoms were: Fatigue: 2 Drowsiness: 0   Depression: 0 Pain: 0   Anxiety: 0 Nausea: 0   Anorexia: 0 Dyspnea: 0   Best Well-Bein Constipation: No   Other Problem (Comment): 0       This is the plan we talked about:    1. Your move!  -You and Maycol Trinidad moved to [place] after Maycol Trinidad retired  -You have a nice, big  bedroom with your own furniture  -You're enjoying getting to know [place]-Stefanie and Maycol Trinidad will be going to [address] later this week    2. Your pain  -Your knees haven't been hurting lately  -You have no abdominal pain  -You continue to take extended-release morphine 3 times a day  -You haven't used any aspirin or used aspercreme in quite a while  -You use tylenol and salonpas patches as needed but haven't needed these lately    3. Your function  -You're back to using your cane around the house instead of your walker  -You use the wheelchair when you go out  -You have a ramp to the front door  -You haven't had any falls  -You bathe yourself at the sink and dress yourself  -[place] Am[place] helps you with bathing every few days with a shower every few weeks  -You have a shower chair    4. Health maintenance  -We discussed the flu vaccine at your last visit and you declined  -Your Advanced Medical Directives are up-to-date    .       This is what you have shared with us about Advance Care Planning:      Primary Decision Maker: Marietta Josh - Other Relative - [phone]  Advance Care Planning 2019   Patient's Healthcare Decision Maker is: Named in scanned ACP document   Primary Decision Maker Name -   Primary Decision Maker Phone Number -   Primary Decision Maker Relationship to Patient -   Confirm Advance Directive Yes, on file   Does the patient have other document types Power of Guerrerostad; Do Not Resuscitate           The Palliative Medicine Team is here to support you and your family.        Sincerely,      Brennon West MD and the Palliative Medicine Team

## 2019-11-19 NOTE — PROGRESS NOTES
Palliative Medicine Outpatient Services  [place]: [phone]    Patient Name: Nazia Martinez  YOB: 1923    Date of Current Visit: 11/19/19  Location of Current Visit:    [x] [facility] Office  [] [facility] Office  [] [facility] Office  [] Home  [] Other:      Date of Initial Visit: 2/28/18   Requesting Physician: Shiraz Brewer PA-C  Primary Care Physician: Selene Flynn PA-C      SUMMARY:   Nazia Martinez is a 80y.o. year old with a past history of pancreatic mass by CT suspicious for malignancy, who was referred to Palliative Medicine by Ms. Naz Cheng for symptom management and supportive care. She was hospitalized in [place] in 7/2017 with decreased appetite, functional decline and abdominal pain. 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 She declined further work-up (no biopsy). She was given a presumptive diagnosis of pancreatic cancer. She was discharged to live with her closest living relative, 2nd cousin/POA, Ban Hart, and was followed by [facility] until recently when  she was discharged from hospice for failure to decline. She's thrived since hospital discharge, maintaining her functional abilities and with a significant weight gain. The patients social history includes: she is . She has no children. She has no living siblings. She lives in [place] with her second cousin/POA, Ban Hart, and her beloved Errol Wagner and their [place], [place]. Palliative Medicine is addressing the following current patient/family concerns: abdominal pain, mild fatigue, constipation, dementia, maintaining function and quality of life. PALLIATIVE DIAGNOSES:       ICD-10-CM ICD-9-CM    1. Bilateral chronic knee pain M25.561 719.46     M25.562 338.29     G89.29     2.  Abdominal pain, generalized R10.84 789.07 morphine CR (MS CONTIN) 15 mg CR tablet      DISCONTINUED: morphine CR (MS CONTIN) 15 mg CR tablet      DISCONTINUED: morphine CR (MS CONTIN) 15 mg CR tablet   3. Gait instability R26.81 781.2    4. Drug-induced constipation K59.03 564.09      E980.5    5. Memory difficulties R41.3 780.93           PLAN:   Patient Instructions     Dear Elham Monday ,    It was a pleasure seeing you today in [facility]. We will see you again in 12 weeks. We will schedule an early afternoon appointment for you. If labs or imaging tests have been ordered for you today, please call the office  at [phone] 48 hours after completion to obtain the results. Your stated goal: continue being active! Your described symptoms were: Fatigue: 2 Drowsiness: 0   Depression: 0 Pain: 0   Anxiety: 0 Nausea: 0   Anorexia: 0 Dyspnea: 0   Best Well-Bein Constipation: No   Other Problem (Comment): 0       This is the plan we talked about:    1. Your move!  -You and Patsy Pan moved to [place] after Patsy Medal retired  -You have a nice, big  bedroom with your own furniture  -You're enjoying getting to know [place]-Stefanie and Patsy Medal will be going to [address] later this week    2. Your pain  -Your knees haven't been hurting lately  -You have no abdominal pain  -You continue to take extended-release morphine 3 times a day  -You haven't used any aspirin or used aspercreme in quite a while  -You use tylenol and salonpas patches as needed but haven't needed these lately    3. Your function  -You're back to using your cane around the house instead of your walker  -You use the wheelchair when you go out  -You have a ramp to the front door  -You haven't had any falls  -You bathe yourself at the sink and dress yourself  -Patsy Medal helps you with bathing every few days with a shower every few weeks  -You have a shower chair    4. Health maintenance  -We discussed the flu vaccine at your last visit and you declined  -Your Advanced Medical Directives are up-to-date    .       This is what you have shared with us about Advance Care Planning:      Primary Decision Maker: Sophie Saunders Relative - [phone]  Advance Care Planning 11/19/2019   Patient's Healthcare Decision Maker is: Named in scanned ACP document   Primary Decision Maker Name -   Primary Decision Maker Phone Number -   Primary Decision Maker Relationship to Patient -   Confirm Advance Directive Yes, on file   Does the patient have other document types Power of Guerrerostad; Do Not Resuscitate           The Palliative Medicine Team is here to support you and your family. Sincerely,      Camren Patino MD and the Palliative Medicine Team          Counseling and Coordination: note routed to [address] / TREATMENT PREFERENCES:   [====Goals of Care====]  GOALS OF CARE:  Patient / health care proxy stated goals: maintenance of quality of life      TREATMENT PREFERENCES:   Code Status:  [] Attempt Resuscitation       [x] Do Not Attempt Resuscitation      Primary Decision MakerPyehuda Ruiz Relative - [phone]  Advance Care Planning:  Advance Care Planning 11/19/2019   Patient's Healthcare Decision Maker is: Named in scanned ACP document   Primary Decision Maker Name -   Primary Decision Maker Phone Number -   Primary Decision Maker Relationship to Patient -   Confirm Advance Directive Yes, on file   Does the patient have other document types Power of Guerrerostad; Do Not Resuscitate       Other:  (If patient appropriate for POST, consider using PALLPOST smart phrase here)    The palliative care team has discussed with patient / health care proxy about goals of care / treatment preferences for patient.  [====Goals of Care====]         PRESCRIPTIONS GIVEN:     Medications Ordered Today   Medications    DISCONTD: morphine CR (MS CONTIN) 15 mg CR tablet     Sig: Take 1 Tab by mouth every eight (8) hours for 30 days. Max Daily Amount: 45 mg.      Dispense:  90 Tab     Refill:  0    DISCONTD: morphine CR (MS CONTIN) 15 mg CR tablet Sig: Take 1 Tab by mouth every eight (8) hours for 30 days. Max Daily Amount: 45 mg. Dispense:  90 Tab     Refill:  0    morphine CR (MS CONTIN) 15 mg CR tablet     Sig: Take 1 Tab by mouth every eight (8) hours for 30 days. Max Daily Amount: 45 mg. Dispense:  90 Tab     Refill:  0           FOLLOW UP:     Future Appointments   Date Time Provider Getachew Pearson   2/19/2020  1:30 PM Pepe Salcedo MD [facility] [address]   3/18/2020  2:00 PM RENE Zuniga IN CARE:   Patient Care Team:  Berneta Fothergill as PCP - General (Physician Assistant)  Bisi Dougherty PA-C as PCP - [facility] Empaneled Provider  Pepe Salcedo MD as Physician (Palliative Medicine)       HISTORY:   Nursing documentation from date of visit reviewed. Reviewed patient-completed ESAS and advance care planning form. Reviewed patient record in prescription monitoring program.    CHIEF COMPLAINT: chronic pain      HPI/SUBJECTIVE:    The patient is: [x] Verbal / [] Nonverbal (most history obtained from primary caregiver, Melanie Rivas, due to memory difficulties)    She and Melanie Rivas have moved to [place] after Melanie Rivas retired. She's not had any knee pain for awhile now, hasn't taken any aspirin or used salonpas patches. She never has abdominal pain. She continues to take morphine 3 times a day. She's eating well, too well! She's moving her bowels regularly. They're living in a one level home now. She feels stronger now than she did last summer. She's using her cane around the house now instead of her walker. .   She uses the wheelchair on outings. She's had no falls. There's a ramp up to the front door. She's enjoying her new bedroom. Minerva Hassanon going out to [employer] and to explore the [employer]. Minerva Cinnamon going to [employer] later this week. She feels happy, Melanie Rivas keeps her busy!     Clinical Pain Assessment (nonverbal scale for nonverbal patients):   [++++ Clinical Pain Assessment++++]  [++++Pain Severity++++]: Pain: 0  [++++Pain Character++++]: ache  [++++Pain Duration++++]: years  [++++Pain Effect++++]: less active when pain flares  [++++Pain Factors++++]: aspercreme, salonpas helps  [++++Pain Frequency++++]: intermittent  [++++Pain Location++++]: knees  [++++ Clinical Pain Assessment++++]       FUNCTIONAL ASSESSMENT:     Palliative Performance Scale (PPS):  PPS: 70       PSYCHOSOCIAL/SPIRITUAL SCREENING:     Any spiritual / Buddhist concerns:  [] Yes /  [x] No    Caregiver Burnout:  [] Yes /  [x] No /  [] No Caregiver Present      Anticipatory grief assessment:   [x] Normal  / [] Maladaptive       ESAS Anxiety: Anxiety: 0    ESAS Depression: Depression: 0       REVIEW OF SYSTEMS:     The following systems were [x] reviewed / [] unable to be reviewed  Systems: constitutional, ears/nose/mouth/throat, respiratory, gastrointestinal, genitourinary, musculoskeletal, integumentary, neurologic, psychiatric, endocrine. Positive findings noted below. Modified ESAS Completed by: provider   Fatigue: 2 Drowsiness: 0   Depression: 0 Pain: 0   Anxiety: 0 Nausea: 0   Anorexia: 0 Dyspnea: 0   Best Well-Bein Constipation: No   Other Problem (Comment): 0          PHYSICAL EXAM:     Wt Readings from Last 3 Encounters:   19 170 lb 9.6 oz (77.4 kg)   19 160 lb (72.6 kg)   19 160 lb 9.6 oz (72.8 kg)     Blood pressure 146/64, pulse 67, temperature 97.3 °F (36.3 °C), temperature source Oral, resp. rate 16, height 5' 2\" (1.575 m), weight 170 lb 9.6 oz (77.4 kg), SpO2 93 %.   Last bowel movement: See Nursing Note    Constitutional: sitting in wheelchair, appears relaxed, Joey Ask at side  Eyes: pupils equal, anicteric  ENMT: no nasal discharge, moist mucous membranes; HARD OF HEARING  Cardiovascular: regular, rate and rhythm; I/VI MANUEL RUSB (non-radiating); trace pedal and ankle edema  Respiratory: breathing not labored, symmetric  Gastrointestinal: soft non-tender, +bowel sounds  Musculoskeletal: bilateral knees with no deformity; no effusion, warmth,   Skin: warm, dry  Neurologic: alert, oriented to name, relates stories about her pets, following commands, moving all extremities  Psychiatric: full affect, no hallucinations  Other:       HISTORY:     Past Medical History:   Diagnosis Date    Cancer ([address]     Pancreatic    Dry eyes     Hypertension, essential     Migraine       No past surgical history on file. No family history on file. History reviewed, no pertinent family history. Social History     Tobacco Use    Smoking status: Never Smoker    Smokeless tobacco: Never Used   Substance Use Topics    Alcohol use: No     No Known Allergies   Current Outpatient Medications   Medication Sig    [START ON 1/29/2020] morphine CR (MS CONTIN) 15 mg CR tablet Take 1 Tab by mouth every eight (8) hours for 30 days. Max Daily Amount: 45 mg.    senna-docusate (SENNA PLUS) 8.6-50 mg per tablet Take 2 Tabs by mouth two (2) times a day.  amoxicillin (AMOXIL) 875 mg tablet Take 1 Tab by mouth two (2) times a day.  aspirin delayed-release 81 mg tablet Take  by mouth daily as needed for Pain.  DULCOLAX, BISACODYL, PO Take 0.5 Caps by mouth daily as needed. No current facility-administered medications for this visit.            LAB DATA REVIEWED:     No results found for: WBC, HGB, PLT, HGBEXT, PLTEXT, HGBEXT, PLTEXT  No results found for: NA, K, CL, CO2, BUN, CREA, CA, MG, PHOS   No results found for: SGOT, GPT, AP, TBIL, TP, ALB, GLOB, GGT  No results found for: INR, PTMR, PTP, PT1, PT2, APTT, INREXT, INREXT   No results found for: IRON, FE, TIBC, IBCT, PSAT, FERR        CONTROLLED SUBSTANCES SAFETY ASSESSMENT (IF ON CONTROLLED SUBSTANCES):     Reviewed opioid safety handout:  [x] Yes   [] No  24 hour opioid dose >150mg morphine equivalent/day:  [] Yes   [x] No  Benzodiazepines:  [] Yes   [x] No  Sleep apnea:  [] Yes   [x] No  Urine Toxicology Testing within last 6 months: [] Yes   [x] No  History of or new aberrant medication taking behaviors:  [] Yes   [x] No  Narcan prescribed:  [] Yes   [x] No          Total time:   Counseling / coordination time:   > 50% counseling / coordination?:

## 2019-11-19 NOTE — PROGRESS NOTES
Palliative Medicine Office Visit  Palliative Medicine Nurse Check In  [phone]    Patient Name: Haydee Solorio  YOB: 1923      Date of Office Visit:  11/19/2019      Patient states: Follow up no issues    From Check In Sheet (scanned in Media):  Has a medical provider talked with you about cardiopulmonary resuscitation (CPR)? [x] Yes   [] No   [] Unable to obtain    Nurse reminder to complete or update ACP FlowSheet:    Is ACP on the Problem List?    [x] Yes    [] No  IF ACP Document is ON FILE; Nurse to place ACP on Problem List     Is there an ACP Note in Chart Review/Note? [x] Yes    [] No   If NO: [address] Planning 8/27/2019   Patient's Healthcare Decision Maker is: Named in scanned ACP document   Primary Decision Maker Name -   Primary Decision Maker Phone Number -   Primary Decision Maker Relationship to Patient -   Confirm Advance Directive Yes, on file   Does the patient have other document types Do Not Resuscitate        Primary Decision MakerFrank  - Other Relative - [phone]  Advance Care Planning 11/19/2019   Patient's Healthcare Decision Maker is: Named in scanned ACP document   Primary Decision Maker Name -   Primary Decision Maker Phone Number -   Primary Decision Maker Relationship to Patient -   Confirm Advance Directive Yes, on file   Does the patient have other document types Power of Gu[place]rostad; Do Not Resuscitate       Is there anything that we should know about you as a person in order to provide you the best care possible? Have you been to the ER, urgent care clinic since your last visit? [] Yes   [x] No   [] Unable to obtain    Have you been hospitalized since your last visit? [] Yes   [x] No   [] Unable to obtain    Have you seen or consulted any other health care providers outside of the [address] [place] [address] since your last visit?    [] Yes   [x] No   [] Unable to obtain    Functional status (describe):     Last BM: the day before yesterday     accessed (date): Bottle review (for opioid pain medication):  Medication 1:  morphine sulf Er 15 mg tab  Date filled:   Directions:   Take 1 tab every 8 hrs   # filled: 90  # left: 43  # pills taking per day: 3  Last dose taken: 11/19/2019      Medication 2:   Date filled:   Directions:   # filled:   # left:   # pills taking per day:  Last dose taken:    Medication 3:   Date filled:   Directions:   # filled:   # left:   # pills taking per day:  Last dose taken:    Medication 4:   Date filled:   Directions:   # filled:   # left:   # pills taking per day:  Last dose taken:

## 2019-12-13 ENCOUNTER — TELEPHONE (OUTPATIENT)
Dept: PALLATIVE CARE | Age: 84
End: 2019-12-13

## 2019-12-13 NOTE — TELEPHONE ENCOUNTER
Lowell Solitario called. Somewhere during the move she misplaced patient's lactolose medication.   Please call in to [facility] on [address] in [place], [place].

## 2019-12-13 NOTE — TELEPHONE ENCOUNTER
Returned call to Ms Radha Turner. She requested refill on lactulose. I reviewed the medication list. Patient is taking senna plus. Ms Radha Turner states maybe it was ordered while on Hospice. She states Ms Cody Ortega last BM was in 3 days. She does not c/o any distress. I asked Ms Radha Turner if she's tried prune juice. She said she will try giving her that and if no BM she will call PM back.

## 2020-01-01 ENCOUNTER — DOCUMENTATION ONLY (OUTPATIENT)
Dept: PALLATIVE CARE | Age: 85
End: 2020-01-01

## 2020-01-01 ENCOUNTER — TELEPHONE (OUTPATIENT)
Dept: FAMILY MEDICINE CLINIC | Age: 85
End: 2020-01-01

## 2020-01-01 ENCOUNTER — TELEPHONE (OUTPATIENT)
Dept: PALLATIVE CARE | Age: 85
End: 2020-01-01

## 2020-01-01 ENCOUNTER — PATIENT MESSAGE (OUTPATIENT)
Dept: PALLATIVE CARE | Age: 85
End: 2020-01-01

## 2020-01-01 ENCOUNTER — OFFICE VISIT (OUTPATIENT)
Dept: PALLATIVE CARE | Age: 85
End: 2020-01-01

## 2020-01-01 ENCOUNTER — VIRTUAL VISIT (OUTPATIENT)
Dept: INTERNAL MEDICINE CLINIC | Age: 85
End: 2020-01-01

## 2020-01-01 ENCOUNTER — VIRTUAL VISIT (OUTPATIENT)
Dept: PALLATIVE CARE | Age: 85
End: 2020-01-01
Payer: MEDICARE

## 2020-01-01 ENCOUNTER — TELEPHONE (OUTPATIENT)
Dept: INTERNAL MEDICINE CLINIC | Age: 85
End: 2020-01-01

## 2020-01-01 VITALS
HEIGHT: 62 IN | HEART RATE: 75 BPM | WEIGHT: 161.8 LBS | BODY MASS INDEX: 29.77 KG/M2 | DIASTOLIC BLOOD PRESSURE: 75 MMHG | SYSTOLIC BLOOD PRESSURE: 168 MMHG | TEMPERATURE: 98.5 F

## 2020-01-01 VITALS
TEMPERATURE: 98.6 F | DIASTOLIC BLOOD PRESSURE: 76 MMHG | RESPIRATION RATE: 16 BRPM | SYSTOLIC BLOOD PRESSURE: 157 MMHG | WEIGHT: 160.4 LBS | HEART RATE: 98 BPM | BODY MASS INDEX: 29.52 KG/M2 | HEIGHT: 62 IN

## 2020-01-01 VITALS
HEIGHT: 62 IN | SYSTOLIC BLOOD PRESSURE: 149 MMHG | RESPIRATION RATE: 16 BRPM | TEMPERATURE: 97.4 F | HEART RATE: 74 BPM | WEIGHT: 167.6 LBS | BODY MASS INDEX: 30.84 KG/M2 | OXYGEN SATURATION: 91 % | DIASTOLIC BLOOD PRESSURE: 63 MMHG

## 2020-01-01 DIAGNOSIS — R41.3 MEMORY DIFFICULTIES: ICD-10-CM

## 2020-01-01 DIAGNOSIS — M25.562 BILATERAL CHRONIC KNEE PAIN: ICD-10-CM

## 2020-01-01 DIAGNOSIS — F01.518 VASCULAR DEMENTIA WITH BEHAVIOR DISTURBANCE: Primary | ICD-10-CM

## 2020-01-01 DIAGNOSIS — Z72.820 POOR SLEEP: Primary | ICD-10-CM

## 2020-01-01 DIAGNOSIS — R10.84 ABDOMINAL PAIN, GENERALIZED: ICD-10-CM

## 2020-01-01 DIAGNOSIS — K59.03 DRUG-INDUCED CONSTIPATION: ICD-10-CM

## 2020-01-01 DIAGNOSIS — C25.9 MALIGNANT NEOPLASM OF PANCREAS, UNSPECIFIED LOCATION OF MALIGNANCY (HCC): Primary | ICD-10-CM

## 2020-01-01 DIAGNOSIS — Z72.820 POOR SLEEP: ICD-10-CM

## 2020-01-01 DIAGNOSIS — K86.89 PANCREATIC MASS: ICD-10-CM

## 2020-01-01 DIAGNOSIS — G89.29 BILATERAL CHRONIC KNEE PAIN: ICD-10-CM

## 2020-01-01 DIAGNOSIS — R26.81 GAIT INSTABILITY: ICD-10-CM

## 2020-01-01 DIAGNOSIS — R03.0 ELEVATED BP WITHOUT DIAGNOSIS OF HYPERTENSION: ICD-10-CM

## 2020-01-01 DIAGNOSIS — M25.561 BILATERAL CHRONIC KNEE PAIN: ICD-10-CM

## 2020-01-01 DIAGNOSIS — R10.84 ABDOMINAL PAIN, GENERALIZED: Primary | ICD-10-CM

## 2020-01-01 DIAGNOSIS — L82.1 KERATOSIS, SEBORRHEIC: ICD-10-CM

## 2020-01-01 DIAGNOSIS — G89.29 CHRONIC PAIN OF BOTH KNEES: ICD-10-CM

## 2020-01-01 DIAGNOSIS — M25.561 CHRONIC PAIN OF BOTH KNEES: ICD-10-CM

## 2020-01-01 DIAGNOSIS — C25.9 MALIGNANT NEOPLASM OF PANCREAS, UNSPECIFIED LOCATION OF MALIGNANCY (HCC): ICD-10-CM

## 2020-01-01 DIAGNOSIS — R53.83 FATIGUE, UNSPECIFIED TYPE: ICD-10-CM

## 2020-01-01 DIAGNOSIS — M25.471 RIGHT ANKLE SWELLING: ICD-10-CM

## 2020-01-01 DIAGNOSIS — F01.518 VASCULAR DEMENTIA WITH BEHAVIOR DISTURBANCE: ICD-10-CM

## 2020-01-01 DIAGNOSIS — F03.91 DEMENTIA WITH BEHAVIORAL DISTURBANCE, UNSPECIFIED DEMENTIA TYPE: Primary | ICD-10-CM

## 2020-01-01 DIAGNOSIS — R63.0 POOR APPETITE: ICD-10-CM

## 2020-01-01 DIAGNOSIS — M25.562 CHRONIC PAIN OF BOTH KNEES: ICD-10-CM

## 2020-01-01 DIAGNOSIS — R60.0 BILATERAL LEG EDEMA: Primary | ICD-10-CM

## 2020-01-01 DIAGNOSIS — F41.9 ANXIETY: ICD-10-CM

## 2020-01-01 DIAGNOSIS — M25.472 LEFT ANKLE SWELLING: ICD-10-CM

## 2020-01-01 DIAGNOSIS — R41.3 MEMORY DIFFICULTIES: Primary | ICD-10-CM

## 2020-01-01 LAB
ALBUMIN SERPL-MCNC: 3.8 G/DL (ref 3.5–4.6)
ALBUMIN/GLOB SERPL: 1.3 {RATIO} (ref 1.2–2.2)
ALP SERPL-CCNC: 47 IU/L (ref 39–117)
ALT SERPL-CCNC: 10 IU/L (ref 0–32)
AST SERPL-CCNC: 12 IU/L (ref 0–40)
BILIRUB SERPL-MCNC: 0.3 MG/DL (ref 0–1.2)
BUN SERPL-MCNC: 21 MG/DL (ref 10–36)
BUN/CREAT SERPL: 18 (ref 12–28)
CALCIUM SERPL-MCNC: 9.2 MG/DL (ref 8.7–10.3)
CHLORIDE SERPL-SCNC: 97 MMOL/L (ref 96–106)
CO2 SERPL-SCNC: 27 MMOL/L (ref 20–29)
CREAT SERPL-MCNC: 1.15 MG/DL (ref 0.57–1)
GLOBULIN SER CALC-MCNC: 2.9 G/DL (ref 1.5–4.5)
GLUCOSE SERPL-MCNC: 166 MG/DL (ref 65–99)
POTASSIUM SERPL-SCNC: 4.6 MMOL/L (ref 3.5–5.2)
PROT SERPL-MCNC: 6.7 G/DL (ref 6–8.5)
SODIUM SERPL-SCNC: 136 MMOL/L (ref 134–144)

## 2020-01-01 PROCEDURE — 99214 OFFICE O/P EST MOD 30 MIN: CPT | Performed by: INTERNAL MEDICINE

## 2020-01-01 PROCEDURE — 99215 OFFICE O/P EST HI 40 MIN: CPT | Performed by: INTERNAL MEDICINE

## 2020-01-01 RX ORDER — MORPHINE SULFATE 15 MG/1
15 TABLET, FILM COATED, EXTENDED RELEASE ORAL EVERY 8 HOURS
Qty: 90 TAB | Refills: 0 | Status: SHIPPED | OUTPATIENT
Start: 2020-01-01 | End: 2020-01-01 | Stop reason: SDUPTHER

## 2020-01-01 RX ORDER — TRAZODONE HYDROCHLORIDE 50 MG/1
TABLET ORAL
Qty: 45 TAB | Refills: 0 | Status: SHIPPED | OUTPATIENT
Start: 2020-01-01 | End: 2020-01-01

## 2020-01-01 RX ORDER — HALOPERIDOL 0.5 MG/1
0.5 TABLET ORAL 3 TIMES DAILY
COMMUNITY
End: 2020-01-01 | Stop reason: SDUPTHER

## 2020-01-01 RX ORDER — HALOPERIDOL 0.5 MG/1
0.5 TABLET ORAL 3 TIMES DAILY
Qty: 90 TAB | Refills: 0 | Status: SHIPPED | OUTPATIENT
Start: 2020-01-01 | End: 2020-01-01

## 2020-01-01 RX ORDER — MORPHINE SULFATE 15 MG/1
15 TABLET, FILM COATED, EXTENDED RELEASE ORAL EVERY 8 HOURS
Qty: 90 TAB | Refills: 0 | Status: SHIPPED | OUTPATIENT
Start: 2020-01-01 | End: 2021-01-01

## 2020-01-01 RX ORDER — METOPROLOL TARTRATE 25 MG/1
25 TABLET, FILM COATED ORAL 2 TIMES DAILY
Qty: 60 TAB | Refills: 0 | Status: SHIPPED | OUTPATIENT
Start: 2020-01-01 | End: 2020-01-01

## 2020-01-01 RX ORDER — LORAZEPAM 1 MG/1
1 TABLET ORAL
Qty: 60 TAB | Refills: 0 | Status: SHIPPED | OUTPATIENT
Start: 2020-01-01

## 2020-01-01 RX ORDER — TRAZODONE HYDROCHLORIDE 50 MG/1
TABLET ORAL
Qty: 45 TAB | Refills: 0 | Status: SHIPPED | OUTPATIENT
Start: 2020-01-01 | End: 2021-01-01 | Stop reason: SDUPTHER

## 2020-01-01 RX ORDER — METOPROLOL TARTRATE 25 MG/1
TABLET, FILM COATED ORAL
Qty: 180 TAB | Refills: 0 | Status: SHIPPED | OUTPATIENT
Start: 2020-01-01 | End: 2021-01-01 | Stop reason: SDUPTHER

## 2020-01-01 RX ORDER — TRAZODONE HYDROCHLORIDE 50 MG/1
75 TABLET ORAL
Qty: 45 TAB | Refills: 0 | OUTPATIENT
Start: 2020-01-01 | End: 2020-01-01 | Stop reason: SDUPTHER

## 2020-01-01 RX ORDER — POLYETHYLENE GLYCOL 3350 17 G/17G
17 POWDER, FOR SOLUTION ORAL AS NEEDED
COMMUNITY

## 2020-01-01 RX ORDER — HALOPERIDOL 0.5 MG/1
TABLET ORAL
Qty: 90 TAB | Refills: 0 | Status: SHIPPED | OUTPATIENT
Start: 2020-01-01 | End: 2021-01-01 | Stop reason: SDUPTHER

## 2020-01-01 RX ORDER — TRAZODONE HYDROCHLORIDE 50 MG/1
75 TABLET ORAL
Qty: 45 TAB | Refills: 0 | Status: SHIPPED | OUTPATIENT
Start: 2020-01-01 | End: 2020-01-01

## 2020-01-01 RX ORDER — MORPHINE SULFATE 15 MG/1
15 TABLET, FILM COATED, EXTENDED RELEASE ORAL EVERY 8 HOURS
Qty: 90 TAB | Refills: 0 | Status: SHIPPED | OUTPATIENT
Start: 2020-01-01 | End: 2020-01-01

## 2020-01-01 RX ORDER — LACTULOSE 10 G/15ML
10 SOLUTION ORAL; RECTAL
Qty: 240 ML | Refills: 0 | Status: SHIPPED | OUTPATIENT
Start: 2020-01-01 | End: 2020-01-01

## 2020-01-01 RX ORDER — SERTRALINE HYDROCHLORIDE 25 MG/1
25 TABLET, FILM COATED ORAL DAILY
Qty: 30 TAB | Refills: 0 | Status: SHIPPED | OUTPATIENT
Start: 2020-01-01 | End: 2020-01-01

## 2020-01-01 RX ORDER — METOPROLOL TARTRATE 25 MG/1
25 TABLET, FILM COATED ORAL 2 TIMES DAILY
COMMUNITY
End: 2020-01-01 | Stop reason: SDUPTHER

## 2020-01-01 RX ORDER — SERTRALINE HYDROCHLORIDE 25 MG/1
TABLET, FILM COATED ORAL
Qty: 30 TAB | Refills: 0 | Status: SHIPPED | OUTPATIENT
Start: 2020-01-01 | End: 2020-01-01

## 2020-01-01 RX ORDER — HALOPERIDOL 0.5 MG/1
TABLET ORAL
Qty: 90 TAB | Refills: 0 | Status: SHIPPED | OUTPATIENT
Start: 2020-01-01 | End: 2020-01-01

## 2020-01-01 RX ORDER — LACTULOSE 10 G/15ML
SOLUTION ORAL; RECTAL
Qty: 1 BOTTLE | Refills: 0 | Status: SHIPPED | OUTPATIENT
Start: 2020-01-01 | End: 2020-01-01

## 2020-01-01 RX ORDER — SERTRALINE HYDROCHLORIDE 25 MG/1
TABLET, FILM COATED ORAL
Qty: 30 TAB | Refills: 0 | Status: SHIPPED | OUTPATIENT
Start: 2020-01-01 | End: 2021-01-01

## 2020-02-25 NOTE — PATIENT INSTRUCTIONS
Dear Camron Odom ,    It was a pleasure seeing you today in [facility]. We will see you again in 12 weeks. We will schedule an early afternoon appointment for you. If labs or imaging tests have been ordered for you today, please call the office  at [phone] 48 hours after completion to obtain the results. Your stated goal: continue being active! Your described symptoms were: Fatigue: 3 Drowsiness: 3   Depression: 3 Pain: 0   Anxiety: 3 Nausea: 0   Anorexia: 1 Dyspnea: 2   Best Well-Bein Constipation: No   Other Problem (Comment): 0       This is the plan we talked about:    1. Memory  -You had a few episodes of transient episodes of confusion in your home  -You usually have confusion when you go on trips but this is the first time it's happened in your home  -This isn't upsetting to you  -When this happened in your home, Michael Bailey helped you by re-orienting you to your surroundings and your confusion cleared quickly    2. Bowels  -You move your bowels regularly taking 2 senokot twice a day  -Sometimes you get constipation  -Michael Bailey gives you Miralax but this takes several days to work  -Lay Barriga been impacted in the past.  -You've used lactulose in the past which worked well for you  -Today I prescribed lactulose 10-gm daily as needed     3. Skin  -You had a fungal rash beneath your breasts which cleared with use of an antifungal cream  -You've have a linear rash between your breasts for a few weeks which is getting better  -You asked about a growth on your left neck. This is a seborrheic keratosis, which is a very common, non-cancerous growth. -You have some areas of sun damage to the skin on your face and chest  -We talked about having you see a dermatologist and you're going to think about this    4.  Abdominal pain  -You've had no pain  -Continue long-acting morphine 15-mg every 8 hours  -I refilled your morphine today to be picked up on , 3/28 and   -You've been using this medication at this same dose for years without any side-effects    5. Crackling in ears  -You don't suffer from allergies and you haven't had a recent cold  -This occurs from time-to-time and is likely related to fluid behind the tympanic membrane (ear drum)  -I was unable to examine your ear today  -Please ask Isaias Garcia to take a look when you go back to see hrer    6. Function  -You use your cane around the house  -You use the wheelchair when you go out  -You haven't had any falls  -You bathe yourself at the sink and dress yourself  -Zaira Giordano helps you with bathing every few days with a shower every few weeks  -You have a shower chair      . This is what you have shared with us about Advance Care Planning:      Primary Decision Maker: Keyshawn Warren - Other Relative - [phone]  Advance Care Planning 2/25/2020   Patient's [address] is: Named in scanned ACP document   Primary Decision Maker Name -   Primary Decision Maker Phone Number -   Primary Decision Maker Relationship to Patient -   Confirm Advance Directive Yes, on file   Does the patient have other document types Do Not Resuscitate; Power of            The Palliative Medicine Team is here to support you and your family. Sincerely,      Yary Mancuso MD and the Palliative Medicine Team    x     Seborrheic Keratosis: Care Instructions  Your Care Instructions  Seborrheic keratoses are raised skin growths that look scaly or warty. They usually look like they were stuck onto the skin. They most often grow in groups on the back or chest and are more common in older people. A seborrheic keratosis can be tan or dark brown. A seborrheic keratosis is not a mole and is almost always harmless. But it is still a good idea to check your skin regularly. Sometimes a seborrheic keratosis can itch. Scratching it can cause it to bleed and sometimes even scar. A seborrheic keratosis is removed only if it bothers you.  The doctor will freeze it or scrape it off with a tool. The doctor can also use a laser to remove a seborrheic keratosis. Treatment usually results in normal-looking skin, but it can leave a light or dark kadeem or even a scar on the skin. Follow-up care is a key part of your treatment and safety. Be sure to make and go to all appointments, and call your doctor if you are having problems. It's also a good idea to know your test results and keep a list of the medicines you take. How can you care for yourself at home? · If clothing irritates your seborrheic keratosis, cover it with a bandage to prevent rubbing and bleeding. · If you have a seborrheic keratosis removed, clean the area with soap and water two times a day unless your doctor gives you different instructions. Don't use hydrogen peroxide or alcohol, which can slow healing. ? You may cover the wound with a thin layer of petroleum jelly, such as Vaseline, and a nonstick bandage. · Check all the skin on your body once a month for skin growths or other changes, such as color and feel of the skin. ?  front of a full-length mirror. Look carefully at the front and back of your body. Then look at your right and left sides with your arms raised. ? Bend your elbows and look carefully at your forearms, the back of your upper arms, and your palms. ? Look at your feet, the soles of your feet, and the spaces between your toes. ? Use a hand mirror to look at the back of your legs, the back of your neck, and your back, rear end (buttocks), and genital area. Part the hair on your head to look at your scalp. · If you see a change in a skin growth, contact your doctor. Look for:  ? A mole that bleeds. ? A fast-growing mole. ? A scaly or crusted growth on the skin. ? A sore that will not heal.  When should you call for help?   Call your doctor now or seek immediate medical care if:    · You have an area of normal skin that suddenly changes in shape, size, or how it looks.     · Your skin is badly broken from scratching.     · You have signs of infection such as:  ? Pain, warmth, or swelling in your skin. ? Red streaks near a wound in your skin. ? Pus coming from a wound in your skin. ? A fever not due to the flu or other illness.    Watch closely for changes in your health, and be sure to contact your doctor if:    · You do not get better as expected. Where can you learn more? Go to http://jerome-saumya.info/. Enter A178 in the search box to learn more about \"Seborrheic Keratosis: Care Instructions. \"  Current as of: April 1, 2019  Content Version: 12.2  © 1832-8875 [employer] Care instructions adapted under license by [employer] (which disclaims liability or warranty for this information). If you have questions about a medical condition or this instruction, always ask your healthcare professional. [address] any warranty or liability for your use of this information.

## 2020-02-25 NOTE — PROGRESS NOTES
Palliative Medicine Office Visit  Palliative Medicine Nurse Check In  [phone]    Patient Name: Galilea Ricketts  YOB: 1923      Date of Office Visit: 2/25/2020      Patient states: Area mid chest and left side of neck. From Check In Sheet (scanned in Media):  Has a medical provider talked with you about cardiopulmonary resuscitation (CPR)? [x] Yes   [] No   [] Unable to obtain    Nurse reminder to complete or update ACP FlowSheet:    Is ACP on the Problem List?    [x] Yes    [] No  IF ACP Document is ON FILE; Nurse to place ACP on Problem List     Is there an ACP Note in Chart Review/Note? [x] Yes    [] No   If NO: [address] Planning 11/19/2019   Patient's Healthcare Decision Maker is: Named in scanned ACP document   Primary Decision Maker Name -   Primary Decision Maker Phone Number -   Primary Decision Maker Relationship to Patient -   Confirm Advance Directive Yes, on file   Does the patient have other document types Power of [place]; Do Not Resuscitate        Primary Decision MakerFoy Naz - Other Relative - [phone]  Advance Care Planning 2/25/2020   Patient's [place] is: Named in scanned ACP document   Primary Decision Maker Name -   Primary Decision Maker Phone Number -   Primary Decision Maker Relationship to Patient -   Confirm Advance Directive Yes, on file   Does the patient have other document types Do Not Resuscitate; Power of          Is there anything that we should know about you as a person in order to provide you the best care possible? Have you been to the ER, urgent care clinic since your last visit? [] Yes   [x] No   [] Unable to obtain    Have you been hospitalized since your last visit? [] Yes   [x] No   [] Unable to obtain    Have you seen or consulted any other health care providers outside of the [address] [place] [address] since your last visit?    [] Yes   [x] No   [] Unable to obtain    Functional status (describe):     Last BM:  2/25/2020       accessed (date): Bottle review (for opioid pain medication):  Medication 1: Morphine Sulf Er 15 mg tab   Date filled: 1/29/20  Directions:   Take 1 tab by mouth every 8 hrs  # filled: 90  # left: 22  # pills taking per day:3  Last dose taken: 2/25/2020   Verified/Returned    Medication 2:   Date filled:   Directions:   # filled:   # left:   # pills taking per day:  Last dose taken:    Medication 3:   Date filled:   Directions:   # filled:   # left:   # pills taking per day:  Last dose taken:    Medication 4:   Date filled:   Directions:   # filled:   # left:   # pills taking per day:  Last dose taken:

## 2020-02-25 NOTE — PROGRESS NOTES
Palliative Medicine Outpatient Services  [place]: [phone] ([phone])    Patient Name: Ruba Urban  YOB: 1923    Date of Current Visit: 02/25/20  Location of Current Visit:    [x] [facility] Office  [] [facility] Office  [] [facility] Office  [] Home  [] Other:      Date of Initial Visit: 2/28/18   Requesting Physician: Gerry Phelps PA-C  Primary Care Physician: Pattricia Mcburney, PA-C      SUMMARY:   Ruba Urban is a 80y.o. year old with a past history of pancreatic mass by CT suspicious for malignancy, who was referred to Palliative Medicine by Ms. Keren Ron for symptom management and supportive care. She was hospitalized in [place] in 7/2017 with decreased appetite, functional decline and abdominal pain. 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 She declined further work-up (no biopsy). She was given a presumptive diagnosis of pancreatic cancer. She was discharged to live with her closest living relative, 2nd cousin/POA, Amina Rosen, and was followed by [facility] until recently when  she was discharged from hospice for failure to decline. She's thrived since hospital discharge, maintaining her functional abilities and with a significant weight gain. The patients social history includes: she is . She has no children. She has no living siblings. She lives in [place] with her second cousin/POA, Amina Pump, and her beloved [employer] and their [place], [place]. Palliative Medicine is addressing the following current patient/family concerns: abdominal pain, mild fatigue, constipation, dementia, maintaining function and quality of life. PALLIATIVE DIAGNOSES:       ICD-10-CM ICD-9-CM    1. Memory difficulties R41.3 780.93    2. Drug-induced constipation K59.03 564.09      E980.5    3. Keratosis, seborrheic L82.1 702.19    4.  Abdominal pain, generalized R10.84 789.07 morphine CR (MS CONTIN) 15 mg CR tablet      morphine CR (MS CONTIN) 15 mg CR tablet      morphine CR (MS CONTIN) 15 mg CR tablet   5. Gait instability R26.81 781.2    6. Fatigue, unspecified type R53.83 780.79           PLAN:   Patient Instructions     Dear Alex Burnham ,    It was a pleasure seeing you today in [facility]. We will see you again in 12 weeks. We will schedule an early afternoon appointment for you. If labs or imaging tests have been ordered for you today, please call the office  at [phone] 48 hours after completion to obtain the results. Your stated goal: continue being active! Your described symptoms were: Fatigue: 3 Drowsiness: 3   Depression: 3 Pain: 0   Anxiety: 3 Nausea: 0   Anorexia: 1 Dyspnea: 2   Best Well-Bein Constipation: No   Other Problem (Comment): 0       This is the plan we talked about:    1. Memory  -You had a few episodes of transient episodes of confusion in your home  -You usually have confusion when you go on trips but this is the first time it's happened in your home  -This isn't upsetting to you  -When this happened in your home, Katia Pilar helped you by re-orienting you to your surroundings and your confusion cleared quickly    2. Bowels  -You move your bowels regularly taking 2 senokot twice a day  -Sometimes you get constipation  -[place] Pilar gives you Miralax but this takes several days to work  -Jalil Youssef been impacted in the past.  -You've used lactulose in the past which worked well for you  -Today I prescribed lactulose 10-gm daily as needed     3. Skin  -You had a fungal rash beneath your breasts which cleared with use of an antifungal cream  -You've have a linear rash between your breasts for a few weeks which is getting better  -You asked about a growth on your left neck. This is a seborrheic keratosis, which is a very common, non-cancerous growth.   -You have some areas of sun damage to the skin on your face and chest  -We talked about having you see a dermatologist and you're going to think about this    4. Abdominal pain  -You've had no pain  -Continue long-acting morphine 15-mg every 8 hours  -I refilled your morphine today to be picked up on 2/27, 3/28 and 4/27  -You've been using this medication at this same dose for years without any side-effects    5. Crackling in ears  -You don't suffer from allergies and you haven't had a recent cold  -This occurs from time-to-time and is likely related to fluid behind the tympanic membrane (ear drum)  -I was unable to examine your ear today  -Please ask Carter Montana to take a look when you go back to see hrer    6. Function  -You use your cane around the house  -You use the wheelchair when you go out  -You haven't had any falls  -You bathe yourself at the sink and dress yourself  -Imer Abraham helps you with bathing every few days with a shower every few weeks  -You have a shower chair      . This is what you have shared with us about Advance Care Planning:      Primary Decision Maker: Collette Spar - Other Relative - [phone]  Advance Care Planning 2/25/2020   Patient's [place] is: Named in scanned ACP document   Primary Decision Maker Name -   Primary Decision Maker Phone Number -   Primary Decision Maker Relationship to Patient -   Confirm Advance Directive Yes, on file   Does the patient have other document types Do Not Resuscitate; Power of            The Palliative Medicine Team is here to support you and your family. Sincerely,      Casey Amador MD and the Palliative Medicine Team    x     Seborrheic Keratosis: Care Instructions  Your Care Instructions  Seborrheic keratoses are raised skin growths that look scaly or warty. They usually look like they were stuck onto the skin. They most often grow in groups on the back or chest and are more common in older people. A seborrheic keratosis can be tan or dark brown. A seborrheic keratosis is not a mole and is almost always harmless. But it is still a good idea to check your skin regularly. Sometimes a seborrheic keratosis can itch. Scratching it can cause it to bleed and sometimes even scar. A seborrheic keratosis is removed only if it bothers you. The doctor will freeze it or scrape it off with a tool. The doctor can also use a laser to remove a seborrheic keratosis. Treatment usually results in normal-looking skin, but it can leave a light or dark kadeem or even a scar on the skin. Follow-up care is a key part of your treatment and safety. Be sure to make and go to all appointments, and call your doctor if you are having problems. It's also a good idea to know your test results and keep a list of the medicines you take. How can you care for yourself at home? · If clothing irritates your seborrheic keratosis, cover it with a bandage to prevent rubbing and bleeding. · If you have a seborrheic keratosis removed, clean the area with soap and water two times a day unless your doctor gives you different instructions. Don't use hydrogen peroxide or alcohol, which can slow healing. ? You may cover the wound with a thin layer of petroleum jelly, such as Vaseline, and a nonstick bandage. · Check all the skin on your body once a month for skin growths or other changes, such as color and feel of the skin. ?  front of a full-length mirror. Look carefully at the front and back of your body. Then look at your right and left sides with your arms raised. ? Bend your elbows and look carefully at your forearms, the back of your upper arms, and your palms. ? Look at your feet, the soles of your feet, and the spaces between your toes. ? Use a hand mirror to look at the back of your legs, the back of your neck, and your back, rear end (buttocks), and genital area. Part the hair on your head to look at your scalp. · If you see a change in a skin growth, contact your doctor. Look for:  ? A mole that bleeds. ? A fast-growing mole. ?  A scaly or crusted growth on the skin. ? A sore that will not heal.  When should you call for help? Call your doctor now or seek immediate medical care if:    · You have an area of normal skin that suddenly changes in shape, size, or how it looks.     · Your skin is badly broken from scratching.     · You have signs of infection such as:  ? Pain, warmth, or swelling in your skin. ? Red streaks near a wound in your skin. ? Pus coming from a wound in your skin. ? A fever not due to the flu or other illness.    Watch closely for changes in your health, and be sure to contact your doctor if:    · You do not get better as expected. Where can you learn more? Go to http://jerome-saumya.info/. Enter T047 in the search box to learn more about \"Seborrheic Keratosis: Care Instructions. \"  Current as of: April 1, 2019  Content Version: 12.2  © 1163-1897 [employer]. Care instructions adapted under license by [employer] (which disclaims liability or warranty for this information). If you have questions about a medical condition or this instruction, always ask your healthcare professional. [place] [address] any warranty or liability for your use of this information.           Counseling and Coordination: note routed to [address] / TREATMENT PREFERENCES:   [====Goals of Care====]  GOALS OF CARE:  Patient / health care proxy stated goals: maintenance of quality of life      TREATMENT PREFERENCES:   Code Status:  [] Attempt Resuscitation       [x] Do Not Attempt Resuscitation      Primary Decision MakerAldemickie Lang - Other Relative - [phone]  Advance Care Planning:  Advance Care Planning 2/25/2020   Patient's Healthcare Decision Maker is: Named in scanned ACP document   Primary Decision Maker Name -   Primary Decision Maker Phone Number -   Primary Decision Maker Relationship to Patient -   Confirm Advance Directive Yes, on file   Does the patient have other document types Do Not Resuscitate; Power of        Other:  (If patient appropriate for POST, consider using PALLPOST smart phrase here)    The palliative care team has discussed with patient / health care proxy about goals of care / treatment preferences for patient.  [====Goals of Care====]         PRESCRIPTIONS GIVEN:     Medications Ordered Today   Medications    lactulose (CHRONULAC) 10 gram/15 mL solution     Sig: Take 15 mL by mouth daily as needed (constipation). Dispense:  240 mL     Refill:  0    morphine CR (MS CONTIN) 15 mg CR tablet     Sig: Take 1 Tab by mouth every eight (8) hours for 30 days. Max Daily Amount: 45 mg. Dispense:  90 Tab     Refill:  0    morphine CR (MS CONTIN) 15 mg CR tablet     Sig: Take 1 Tab by mouth every eight (8) hours for 30 days. Max Daily Amount: 45 mg. Dispense:  90 Tab     Refill:  0    morphine CR (MS CONTIN) 15 mg CR tablet     Sig: Take 1 Tab by mouth every eight (8) hours for 30 days. Max Daily Amount: 45 mg. Dispense:  90 Tab     Refill:  0           FOLLOW UP:     Future Appointments   Date Time Provider Getachew Pearson   3/18/2020  2:15 PM Isabel Dubois PA-C [facility] IN CARE:   Patient Care Team:  Tyrell Sanchez as PCP - General (Physician Assistant)  Isabel Dubois PA-C as PCP - [facility] Empaneled Provider  Ignacio Wilson MD as Physician (Palliative Medicine)       HISTORY:   Nursing documentation from date of visit reviewed. Reviewed patient-completed ESAS and advance care planning form. Reviewed patient record in prescription monitoring program.    CHIEF COMPLAINT: memory problems      HPI/SUBJECTIVE:    The patient is: [x] Verbal / [] Nonverbal (most history obtained from primary caregiver, Jhon, due to memory difficulties)    She feels good today.   She's had some transient confusion at home (new)    See Plan/Patient Instructions for details    Clinical Pain Assessment (nonverbal scale for nonverbal patients):   [++++ Clinical Pain Assessment++++]  [++++Pain Severity++++]: Pain: 0  [++++Pain Character++++]: ache  [++++Pain Duration++++]: years  [++++Pain Effect++++]: less active when pain flares  [++++Pain Factors++++]: aspercreme, salonpas helps  [++++Pain Frequency++++]: intermittent  [++++Pain Location++++]: knees  [++++ Clinical Pain Assessment++++]       FUNCTIONAL ASSESSMENT:     Palliative Performance Scale (PPS):  PPS: 60       PSYCHOSOCIAL/SPIRITUAL SCREENING:     Any spiritual / Shinto concerns:  [] Yes /  [x] No    Caregiver Burnout:  [] Yes /  [x] No /  [] No Caregiver Present      Anticipatory grief assessment:   [x] Normal  / [] Maladaptive       ESAS Anxiety: Anxiety: 3    ESAS Depression: Depression: 3       REVIEW OF SYSTEMS:     The following systems were [x] reviewed / [] unable to be reviewed  Systems: constitutional, ears/nose/mouth/throat, respiratory, gastrointestinal, genitourinary, musculoskeletal, integumentary, neurologic, psychiatric, endocrine. Positive findings noted below. Modified ESAS Completed by: provider   Fatigue: 3 Drowsiness: 3   Depression: 3 Pain: 0   Anxiety: 3 Nausea: 0   Anorexia: 1 Dyspnea: 2   Best Well-Bein Constipation: No   Other Problem (Comment): 0          PHYSICAL EXAM:     Wt Readings from Last 3 Encounters:   20 167 lb 9.6 oz (76 kg)   19 170 lb 9.6 oz (77.4 kg)   19 160 lb (72.6 kg)     Blood pressure 149/63, pulse 74, temperature 97.4 °F (36.3 °C), temperature source Oral, resp. rate 16, height 5' 2\" (1.575 m), weight 167 lb 9.6 oz (76 kg), SpO2 91 %.   Last bowel movement: See Nursing Note    Constitutional: sitting in wheelchair, appears relaxed, Brian Albino at side  Eyes: pupils equal, anicteric  ENMT: no nasal discharge, moist mucous membranes; HARD OF HEARING  Cardiovascular: regular, rate and rhythm; I/VI MANUEL RUSB (non-radiating); trace pedal and ankle edema  Respiratory: breathing not labored, symmetric  Gastrointestinal: soft non-tender, +bowel sounds  Musculoskeletal: bilateral knees with no deformity; no effusion, warmth,   Skin: warm, dry; seborrheic keratosis left neck  Neurologic: alert, oriented to name, relates stories about her pets, following commands, moving all extremities  Psychiatric: full affect, no hallucinations  Other:       HISTORY:     Past Medical History:   Diagnosis Date    Cancer ([address]     Pancreatic    Dry eyes     Hypertension, essential     Migraine       No past surgical history on file. No family history on file. History reviewed, no pertinent family history. Social History     Tobacco Use    Smoking status: Never Smoker    Smokeless tobacco: Never Used   Substance Use Topics    Alcohol use: No     No Known Allergies   Current Outpatient Medications   Medication Sig    [START ON 2/27/2020] morphine CR (MS CONTIN) 15 mg CR tablet Take 1 Tab by mouth every eight (8) hours for 30 days. Max Daily Amount: 45 mg.    [START ON 3/28/2020] morphine CR (MS CONTIN) 15 mg CR tablet Take 1 Tab by mouth every eight (8) hours for 30 days. Max Daily Amount: 45 mg.    [START ON 4/27/2020] morphine CR (MS CONTIN) 15 mg CR tablet Take 1 Tab by mouth every eight (8) hours for 30 days. Max Daily Amount: 45 mg.    senna-docusate (SENNA PLUS) 8.6-50 mg per tablet Take 2 Tabs by mouth two (2) times a day.  lactulose (CHRONULAC) 10 gram/15 mL solution Take 15 mL by mouth daily as needed (constipation).  amoxicillin (AMOXIL) 875 mg tablet Take 1 Tab by mouth two (2) times a day.  aspirin delayed-release 81 mg tablet Take  by mouth daily as needed for Pain.  DULCOLAX, BISACODYL, PO Take 0.5 Caps by mouth daily as needed. No current facility-administered medications for this visit.            LAB DATA REVIEWED:     No results found for: WBC, HGB, PLT, HGBEXT, PLTEXT, HGBEXT, PLTEXT  No results found for: NA, K, CL, CO2, BUN, CREA, CA, MG, PHOS   No results found for: SGOT, GPT, AP, TBIL, TP, ALB, GLOB, GGT  No results found for: INR, PTMR, PTP, PT1, PT2, APTT, INREXT, INREXT   No results found for: IRON, FE, TIBC, IBCT, PSAT, FERR        CONTROLLED SUBSTANCES SAFETY ASSESSMENT (IF ON CONTROLLED SUBSTANCES):     Reviewed opioid safety handout:  [x] Yes   [] No  24 hour opioid dose >150mg morphine equivalent/day:  [] Yes   [x] No  Benzodiazepines:  [] Yes   [x] No  Sleep apnea:  [] Yes   [x] No  Urine Toxicology Testing within last 6 months:  [] Yes   [x] No  History of or new aberrant medication taking behaviors:  [] Yes   [x] No  Narcan prescribed:  [] Yes   [x] No          Total time:   Counseling / coordination time:   > 50% counseling / coordination?:

## 2020-03-06 NOTE — TELEPHONE ENCOUNTER
Palliative Medicine  Nursing Note  [phone]  Fax [phone]      Telephone Call  Patient Name: Melodie Chiang  YOB: 1923    3/6/2020        Primary Decision Maker: Pelon Chaudhary - Other Relative - [phone]   Advance Care Planning 2/25/2020   Patient's [place] [address] is: Named in scanned ACP document   Primary Decision Maker Name -   Primary Decision Maker Phone Number -   Primary Decision Maker Relationship to Patient -   Confirm Advance Directive Yes, on file   Does the patient have other document types Do Not Resuscitate; Power of [employer] call to Petros Azul, great niece that reported when she went to get patient up this morning around 6 am, she complained of increase pain on her right side. She was not able to rate pain on scale of 1-10 but did report PAIN LEVEL \"very high\". She was given Morphine 15 mg as scheduled as 6 am, and went back to bed. Around 8 am niece assisted patient up and to the bathroom - no bowel movements in the last 2 days, she is not eating and drinking as she normally does. Heat pad applied to right rib, and patient is currently resting comfortably in her chair. Patient c/o nausea, but no vomiting. This nurse recommend giving Lactulose as prescribed until she has a bowel movement and then stop - continue to take Senna 2 tabs twice daily, and Miralax daily. Petros Liang will attempt to give small meal this morning (scrambled eggs, and apple sauce). Advised above information will be shared with Dr. Rufino Holland and I will call back with any recommendations.   Petros Liang verbalized understanding and will call back if Symptoms worsens       Kat Cartagena RN  Palliative Medicine

## 2020-03-06 NOTE — TELEPHONE ENCOUNTER
Patient's great niece called. Patient is having a lot of pain on her side where the mass is. She is concerned. Please give her a call as soon as you can.

## 2020-03-06 NOTE — TELEPHONE ENCOUNTER
MD Shweta Medina, RN; Jason Gallegos RN   Caller: Unspecified (Today,  8:11 AM)             I agree with the plan. No additional recommendations. Returned call to Ja Wilson and and she stated that patient was given Lactulose as recommended and she had a significant bowel movement. She continues to rest comfortably but still not much of an appetite. Advised to ensure patient drink fluids, add lemon to water and ok to give Boost as she is not having a full meal.  Ja Wilson appreciative of the recommendations and will continue to monitor.

## 2020-03-13 NOTE — TELEPHONE ENCOUNTER
MsFaizanWillie was calling to check on some items. All done. She was concerned about having other relative picking up scripts from pharmacy. Advised that she would need to check with pharmacy to get their requirements.

## 2020-03-19 NOTE — TELEPHONE ENCOUNTER
[facility]  Palliative Medicine  Social Work  Telephone Call      Note:  Reached out to encourage setting up MyChart for improved communication with MD's office. Pt's caregiver/cousin, Wanda Alejo, ready and willing to get this set up, voicing appreciation for the information. Length of Call: 5 miin.     Rylan Alexis, CCM, MSSW, LCSW  Palliative   [facility]  [phone]

## 2020-04-01 PROBLEM — R03.0 ELEVATED BP WITHOUT DIAGNOSIS OF HYPERTENSION: Status: ACTIVE | Noted: 2020-01-01

## 2020-04-01 NOTE — PROGRESS NOTES
Lg Carpenter is a 80 y.o. female Visit Vitals /76 Pulse 98 Temp 98.6 °F (37 °C) Resp 16 Ht 5' 2\" (1.575 m) Wt 160 lb 6.4 oz (72.8 kg) BMI 29.34 kg/m² Health Maintenance Due Topic Date Due  GLAUCOMA SCREENING Q2Y  06/27/1988 CC Check up. HPI Virtual visit completed with Everardo. me secondary to COVID-19 outbreak concerns. Bhavana Josephmarvin (caregiver) gives history due to pt's dementia. Jay Hernandez reports that pt is doing well. She has seemed happy, comfortable, and her appetite has been good. Wt Readings from Last 3 Encounters:  
04/01/20 160 lb 6.4 oz (72.8 kg) 02/25/20 167 lb 9.6 oz (76 kg)  
11/19/19 170 lb 9.6 oz (77.4 kg) Seeing palliative medicine regularly. BP slightly high today, but has been acceptable in the past, and risk vs benefit of starting BP medication at this time does not seem in favor of starting tx. Caregiver agrees. BP Readings from Last 3 Encounters:  
04/01/20 157/76  
02/25/20 149/63  
11/19/19 146/64 ROS Review of Systems Constitutional: Negative for fever, malaise/fatigue and weight loss. Respiratory: Negative for shortness of breath. Cardiovascular: Negative for chest pain and palpitations. Neurological: Negative for dizziness, loss of consciousness and weakness. EXAM 
Physical Exam 
Vitals signs and nursing note reviewed. Constitutional:   
   General: She is not in acute distress. Appearance: She is well-developed. HENT:  
   Head: Normocephalic and atraumatic. Neck: Musculoskeletal: Neck supple. Vascular: No JVD. Cardiovascular:  
   Rate and Rhythm: Normal rate. Pulmonary:  
   Effort: Pulmonary effort is normal. No respiratory distress. Skin: 
   General: Skin is warm and dry. Neurological:  
   Mental Status: She is alert. Mental status is at baseline.   
Psychiatric:     
   Mood and Affect: Mood normal.     
   Behavior: Behavior normal.     
 Thought Content: Thought content normal.     
   Judgment: Judgment normal.  
 
 
 
ASSESSMENT/PLAN 1. Malignant neoplasm of pancreas, unspecified location of malignancy ([facility] [address] Continue routine follow up with Palliative medicine. See me twice yearly and as needed. 2. Elevated BP without dx htn - no tx necessary at this time given compassionate care status.

## 2020-04-10 NOTE — PROGRESS NOTES
Call made to Ms Kavita Chaudhry with regards to appointment scheduled for 5/13/20 at 1:30PM. I informed Ms Kavita Chaudhry appointment needs to be changed to a virtual visit. Verbal consent received explained process.

## 2020-05-08 NOTE — MR AVS SNAPSHOT
[address] [place] 1200 [facility] [address] [place] [address] 
[phone] Patient: Tip Cancel MRN: [id number] Visit Information Date & Time Provider Department Dept. Phone Encounter #  
 2018 10:30 AM Hiral Ortiz MD [facility] [address] [id number] Upcoming Health Maintenance Date Due DTaP/Tdap/Td series (1 - Tdap) 1944 ZOSTER VACCINE AGE 60> 1983 GLAUCOMA SCREENING Q2Y 1988 Bone Densitometry (Dexa) Screening 1988 Pneumococcal 65+ High/Highest Risk (1 of 2 - PCV13) 1988 MEDICARE YEARLY EXAM 3/14/2018 Influenza Age 5 to Adult 2018 Allergies as of 2018  Review Complete On: 2018 By: Zac Watkins LPN No Known Allergies Current Immunizations  Never Reviewed No immunizations on file. Not reviewed this visit You Were Diagnosed With   
  
 Codes Comments Drug-induced constipation    -  Primary ICD-10-CM: K59.03 
ICD-9-CM: 564.09, E980.5 Abdominal pain, generalized     ICD-10-CM: R10.84 ICD-9-CM: 789.07 Fatigue, unspecified type     ICD-10-CM: R53.83 ICD-9-CM: 780.79 Pancreatic mass     ICD-10-CM: K86.9 ICD-9-CM: 577.9 Vitals BP Pulse Temp Resp Height(growth percentile) Weight(growth percentile) 129/54 (BP 1 Location: Left arm, BP Patient Position: Sitting) 69 98.3 °F (36.8 °C) (Oral) 16 5' 2\" (1.575 m) 156 lb 6.4 oz (70.9 kg) SpO2 BMI OB Status Smoking Status 93% 28.61 kg/m2 Postmenopausal Never Smoker BMI and BSA Data Body Mass Index Body Surface Area  
 28.61 kg/m 2 1.76 m 2 Preferred Pharmacy Pharmacy Name Phone [facility] [address] [place] [address] [place] [address] 1500 [facility] [address] [phone] Your Updated Medication List  
  
   
This list is accurate as of 18 11:04 AM.  Always use your most recent med list.  
  
  
  
  
 [Dear  ___] : Dear  [unfilled], aspirin delayed-release 81 mg tablet Take  by mouth daily as needed for Pain. DULCOLAX (BISACODYL) PO Take 0.5 Caps by mouth daily as needed. morphine CR 15 mg CR tablet Commonly known as:  MS CONTIN Take 1 Tab by mouth every eight (8) hours. Max Daily Amount: 45 mg. Start taking on:  7/5/2018 SENNA PLUS 8.6-50 mg per tablet Generic drug:  senna-docusate Take 2 Tabs by mouth two (2) times a day. Prescriptions Printed Refills  
 morphine CR (MS CONTIN) 15 mg CR tablet 0 Starting on: 7/5/2018 Sig: Take 1 Tab by mouth every eight (8) hours. Max Daily Amount: 45 mg.  
 Class: Print Route: Oral  
  
Patient Instructions Dear Shelly Brown , It was a pleasure seeing you in the Palliative Medicine Office today. This is what we talked about:  
 
1. Your abdominal pain 
-Continue morphine long-acting 15-mg every 8 hours 
-You are not using any short-acting opioid pain medicines at this time 
-Diana Barakat very occasionally gives you a baby aspirin when you have other types of pain like a headache 2. Your bowels 
-You've been having regular bowel movements taking senna 2  tabs twice a day 
-You occasionally take dulcolax if you go more than 2 days without a bowel movement 3. Your fatigue 
-You energy is good! 4. Your function(we talked about this at length during your last visit) -You walk with a cane or a walker to help prevent falls. You haven't had any falls since your last visit here 
-You have a wheelchair you can use when you go outside of your home 
-You feed yourself and help Diana Barakat clean up after dinner by drying the dishes 
-You give yourself sponge baths every day with your aide or Diana Barakat close by in case you need any help 
-You are able to get to the bathroom when you need to go and you don't have any accidents 
-Diana Barakat helps to organize your medications for you 
-You have someone with you in your home all the time just in case you need help 5. What's important to you 
-You really enjoy reading, especially mysteries! 
-You and Jacqui Merchant continue to go out once a week for a fun activity and once a week for such things as getting your hair done or going to the grocery store 
-You enjoy the company of your Fredi Cheng, you beloved canine  
-You have a birthday coming up. ..you will be 89 on the 27th of this month! Happy birthday and I hope you enjoy the activities you and Jacqui Merchant have planned to celebrate. This is what you have shared with us about Advance Care Planning Advance Care Planning 4/11/2018 Patient's Healthcare Decision Maker is: Named in scanned ACP document Primary Decision Maker Name Francisca Alas Primary Decision Maker Phone Number [phone] Primary Decision Maker Relationship to Patient Other relative Confirm Advance Directive Yes, on file Does the patient have other document types Do Not Resuscitate The Palliative Medicine Team is here to support you and your family. We will see you again in 8 weeks. Our Nurse Navigator Sylvia will check in with you by phone before that time. If there are any concerns before that time, such as medication questions, worsening symptoms or a need to see a physician for an urgent or emergent situation; please call [phone] (COPE). A physician is also on call after our normal business hours of 8am to 5pm.  
 
In order to serve you better, please allow up to 48 hours for prescription refills to be processed. Certain medications may require more paperwork or a written prescription that you may need to  from the office. We appreciate you letting us know of any refill requests as soon as possible. We also would like you to sign up for [employer] as well. Sincerely, Shawn Shelley MD and the Palliative Medicine Team 
 
 
  
Introducing [facility]!    
 Benson Abdullahi introduces KnockaTVt patient portal. Now you can access parts [FreeTextEntry3] : Frandy Ruiz M.D., F.A.C.S, F.A.S.C.R.S of your medical record, email your doctor's office, and request medication refills online. 1. In your internet browser, go to [email or web address] 2. Click on the First Time User? Click Here link in the Sign In box. You will see the New Member Sign Up page. 3. Enter your Sampa Access Code exactly as it appears below. You will not need to use this code after youve completed the sign-up process. If you do not sign up before the expiration date, you must request a new code. · Sampa Access Code: [email or web address] Expires: 9/4/2018 11:04 AM 
 
4. Enter the last four digits of your Social Security Number (xxxx) and Date of Birth (mm/dd/yyyy) as indicated and click Submit. You will be taken to the next sign-up page. 5. Create a Sampa ID. This will be your Sampa login ID and cannot be changed, so think of one that is secure and easy to remember. 6. Create a Sampa password. You can change your password at any time. 7. Enter your Password Reset Question and Answer. This can be used at a later time if you forget your password. 8. Enter your e-mail address. You will receive e-mail notification when new information is available in [address] 9. Click Sign Up. You can now view and download portions of your medical record. 10. Click the Download Summary menu link to download a portable copy of your medical information. If you have questions, please visit the Frequently Asked Questions section of the Sampa website. Remember, Sampa is NOT to be used for urgent needs. For medical emergencies, dial 911. Now available from your iPhone and Android! Please provide this summary of care documentation to your next provider. Your primary care clinician is listed as Van Copeland. If you have any questions after today's visit, please call [phone].

## 2020-05-11 NOTE — PROGRESS NOTES
Made appt reminder call for 5/13 appt. Verbally spoke to [facility] and confirmed.

## 2020-05-13 NOTE — PROGRESS NOTES
Palliative Medicine Outpatient Services  [place]: [phone] ([phone])    Patient Name: Warren Mathis  YOB: 1923    Date of Current Visit: 05/14/20  Location of Current Visit:    [] [facility] Office  [] [facility] Office  [] [facility] Office  [] Home  [x] Other:  televisit    Date of Initial Visit: 2/28/18   Requesting Physician: Cherelle Mcdonnell PA-C  Primary Care Physician: Berenice Sánchez PA-C      SUMMARY:   Warren Mathis is a 80y.o. year old with a past history of pancreatic mass by CT suspicious for malignancy, who was referred to Palliative Medicine by Ms. Jonah Hartley for symptom management and supportive care. She was hospitalized in [place] in 7/2017 with decreased appetite, functional decline and abdominal pain. 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 She declined further work-up (no biopsy). She was given a presumptive diagnosis of pancreatic cancer. She was discharged to live with her closest living relative, 2nd cousin/POA, Awilda Estrada, and was followed by [facility] until recently when  she was discharged from hospice for failure to decline. She's thrived since hospital discharge, maintaining her functional abilities and with a significant weight gain. The patients social history includes: she is . She has no children. She has no living siblings. She lives in [place] with her second cousin/POA, Awilda Esrtada, and her beloved Leonor Faulkner and their [place], [place]. Palliative Medicine is addressing the following current patient/family concerns: abdominal pain, mild fatigue, constipation, dementia, maintaining function and quality of life. PALLIATIVE DIAGNOSES:       ICD-10-CM ICD-9-CM    1. Abdominal pain, generalized R10.84 789.07 morphine CR (MS Contin) 15 mg CR tablet      morphine CR (MS Contin) 15 mg CR tablet      morphine CR (MS Contin) 15 mg CR tablet   2.  Bilateral chronic knee pain M25.561 719.46     M25.562 338.29     G89.29     3. Memory difficulties R41.3 780.93    4. Gait instability R26.81 781.2    5. Pancreatic mass K86.89 577.8           PLAN:   Patient Instructions     Dear Rashawn Collado ,    It was a pleasure seeing you today in your home via televisit. We will see you again in 12 weeks. We will schedule an early afternoon appointment for you. If labs or imaging tests have been ordered for you today, please call the office  at [phone] 48 hours after completion to obtain the results. Your stated goal: continue being active! Your described symptoms were:          Pain: 0     Nausea: 0           Constipation: No(last bm yesterday)           This is the plan we talked about:    1. Pain  -You've had no pain aside from some occasional low back pain  -Marisue Denver has you use your heating pad when your back aches and this works well  -Liz Shah continue to have no abdominal pain  -Continue long-acting morphine 15-mg every 8 hours  -I refilled your morphine today to be picked up on 2/27, 3/28 and 4/27  -You've been using this medication at this same dose for years without any side-effects    2. Bowels  -You move your bowels regularly taking 2 senokot twice a day  -You have lactulose 10-gm daily as needed for occasional constipation    3.  Memory  -Your memory and ability to recall events is worse in the afternoons  -You've had a few days recently when you've resisted going to bed  -[facility] talked today about whether this might be related to progression of your memory difficulties  -This is new and has occurred only for a few days recently  -Marisue Denver has noticed no other changes which might suggest you have an acute medical issue such as a urinary tract infection  -We talked about the changes in your weekly activities over the past 6 to 8 weeks due to corona virus safety concerns  -You are no longer getting out of the house and interacting with other people (going to [email or web address], getting your nails done)  -Your aide is no longer coming to your home  -These may be playing a part in how you're feeling overall  -Tracy Frank  Is getting you out for drives once a week  -She is going to give us a call if you continue to have trouble going to bed    4. Function  -You use your cane around the house  -You use the wheelchair when you go out  -You haven't had any falls  -You bathe yourself at the sink and dress yourself  -Tracy Frank helps you with bathing every few days with a shower every few weeks  -You have a shower chair      . This is what you have shared with us about Advance Care Planning:      Primary Decision Maker: Jah Sher - Other Relative - [phone]  Advance Care Planning 2/25/2020   Patient's Alie is: Named in scanned ACP document   Primary Decision Maker Name -   Primary Decision Maker Phone Number -   Primary Decision Maker Relationship to Patient -   Confirm Advance Directive Yes, on file   Does the patient have other document types Do Not Resuscitate; Power of            The Palliative Medicine Team is here to support you and your family.        Sincerely,      Donaldo Scott MD and the Palliative Medicine Team            Counseling and Coordination: note routed to Sarika LIAO        [address] / TREATMENT PREFERENCES:   [====Goals of Care====]  GOALS OF CARE:  Patient / health care proxy stated goals: maintenance of quality of life      TREATMENT PREFERENCES:   Code Status:  [] Attempt Resuscitation       [x] Do Not Attempt Resuscitation      Primary Decision MakerWilnigel Hicks - Other Relative - [phone]  Advance Care Planning:  Advance Care Planning 2/25/2020   Patient's Healthcare Decision Maker is: Named in scanned ACP document   Primary Decision Maker Name -   Primary Decision Maker Phone Number -   Primary Decision Maker Relationship to Patient -   Confirm Advance Directive Yes, on file   Does the patient have other document types Do Not Resuscitate; Power of        Other:  (If patient appropriate for POST, consider using PALLPOST smart phrase here)    The palliative care team has discussed with patient / health care proxy about goals of care / treatment preferences for patient.  [====Goals of Care====]         PRESCRIPTIONS GIVEN:     Medications Ordered Today   Medications    morphine CR (MS Contin) 15 mg CR tablet     Sig: Take 1 Tab by mouth every eight (8) hours for 30 days. Max Daily Amount: 45 mg. Dispense:  90 Tab     Refill:  0    morphine CR (MS Contin) 15 mg CR tablet     Sig: Take 1 Tab by mouth every eight (8) hours for 30 days. Max Daily Amount: 45 mg. Dispense:  90 Tab     Refill:  0    morphine CR (MS Contin) 15 mg CR tablet     Sig: Take 1 Tab by mouth every eight (8) hours for 30 days. Max Daily Amount: 45 mg. Dispense:  90 Tab     Refill:  0           FOLLOW UP:     No future appointments. PHYSICIANS INVOLVED IN CARE:   Patient Care Team:  Karo Luna as PCP - General (Physician Assistant)  Javi Rivas PA-C [facility] P[facility]  Maite Aleman MD as Physician (Palliative Medicine)       HISTORY:   Nursing documentation from date of visit reviewed. Reviewed patient-completed ESAS and advance care planning form.   Reviewed patient record in prescription monitoring program.    CHIEF COMPLAINT: memory problems      HPI/SUBJECTIVE:    The patient is: [x] Verbal / [] Nonverbal (most history obtained from primary caregiver, Kiley Abreu, due to memory difficulties)      See Plan/Patient Instructions for interval history    Clinical Pain Assessment (nonverbal scale for nonverbal patients):   [++++ Clinical Pain Assessment++++]  [++++Pain Severity++++]: Pain: 0  [++++Pain Character++++]: ache  [++++Pain Duration++++]: years  [++++Pain Effect++++]: less active when pain flares  [++++Pain Factors++++]: aspercreme, salonpas helps  [++++Pain Frequency++++]: intermittent  [++++Pain Location++++]: knees  [++++ Clinical Pain Assessment++++]       FUNCTIONAL ASSESSMENT:     Palliative Performance Scale (PPS):          PSYCHOSOCIAL/SPIRITUAL SCREENING:     Any spiritual / Restoration concerns:  [] Yes /  [x] No    Caregiver Burnout:  [] Yes /  [x] No /  [] No Caregiver Present      Anticipatory grief assessment:   [x] Normal  / [] Maladaptive       ESAS Anxiety:      ESAS Depression:         REVIEW OF SYSTEMS:     The following systems were [x] reviewed / [] unable to be reviewed  Systems: constitutional, ears/nose/mouth/throat, respiratory, gastrointestinal, genitourinary, musculoskeletal, integumentary, neurologic, psychiatric, endocrine. Positive findings noted below. Modified ESAS Completed by: provider           Pain: 0     Nausea: 0           Constipation: No(last bm yesterday)              PHYSICAL EXAM:     Wt Readings from Last 3 Encounters:   05/13/20 161 lb 12.8 oz (73.4 kg)   04/01/20 160 lb 6.4 oz (72.8 kg)   02/25/20 167 lb 9.6 oz (76 kg)     Blood pressure 168/75, pulse 75, temperature 98.5 °F (36.9 °C), temperature source Oral, height 5' 2\" (1.575 m), weight 161 lb 12.8 oz (73.4 kg).   Last bowel movement: See Nursing Note    Constitutional    [x] Appears well-developed and well-nourished in no apparent distress    [] Abnormal:  Mental status  [x] Alert and awake  [x] Oriented to person/place/time  [x] Able to follow commands  [] Abnormal:   Eyes  [x] EOM normal   [x] Sclera normal   [x] No visible ocular discharge  [] Abnormal:   HENT  [x] Normocephalic, atraumatic  [x] Mouth/Throat: Moist mucous membranes   [x] External Ears normal  [x] Abnormal: hard of hearing  Neck  [x] No visualized mass  [] Abnormal:  Pulmonary/Chest   [x] Respiratory effort normal  [x] No visualized signs of difficulty breathing or respiratory distress  [] Abnormal:  Musculoskeletal  [x] Normal gait with no signs of ataxia  [x] Normal range of motion of neck  [] Abnormal:  Neurological:   [x] No facial asymmetry (Cranial nerve 7 motor function)  [x] No gaze palsy  [] Abnormal:   Skin  [x] No significant exanthematous lesions or discoloration noted on facial skin  [] Abnormal:                                  Psychiatric  [x] Normal affect  [x] No hallucinations  [] Abnormal:    Other pertinent observable physical exam findings:    Due to this being a TeleHealth evaluation, many elements of the physical examination are unable to be assessed. HISTORY:     Past Medical History:   Diagnosis Date    Cancer [facility])     Pancreatic    Dry eyes     Hypertension, essential     Migraine         Social History     Tobacco Use    Smoking status: Never Smoker    Smokeless tobacco: Never Used   Substance Use Topics    Alcohol use: No     No Known Allergies   Current Outpatient Medications   Medication Sig    [START ON 5/27/2020] morphine CR (MS Contin) 15 mg CR tablet Take 1 Tab by mouth every eight (8) hours for 30 days. Max Daily Amount: 45 mg.    [START ON 6/26/2020] morphine CR (MS Contin) 15 mg CR tablet Take 1 Tab by mouth every eight (8) hours for 30 days. Max Daily Amount: 45 mg.    [START ON 7/26/2020] morphine CR (MS Contin) 15 mg CR tablet Take 1 Tab by mouth every eight (8) hours for 30 days. Max Daily Amount: 45 mg.    lactulose (CHRONULAC) 10 gram/15 mL solution TAKE 15 ML BY MOUTH EVERY DAY AS NEEDED FOR CONSTIPATION    senna-docusate (SENNA PLUS) 8.6-50 mg per tablet Take 2 Tabs by mouth two (2) times a day.  aspirin delayed-release 81 mg tablet Take  by mouth daily as needed for Pain.  DULCOLAX, BISACODYL, PO Take 0.5 Caps by mouth daily as needed. No current facility-administered medications for this visit.            LAB DATA REVIEWED:     No results found for: WBC, HGB, PLT, HGBEXT, PLTEXT, HGBEXT, PLTEXT  No results found for: NA, K, CL, CO2, BUN, CREA, CA, MG, PHOS   No results found for: SGOT, GPT, AP, TBIL, TP, ALB, GLOB, GGT  No results found for: INR, PTMR, PTP, PT1, PT2, APTT, INREXT, INREXT   No results found for: IRON, FE, TIBC, IBCT, PSAT, FERR        CONTROLLED SUBSTANCES SAFETY ASSESSMENT (IF ON CONTROLLED SUBSTANCES):     Reviewed opioid safety handout:  [x] Yes   [] No  24 hour opioid dose >150mg morphine equivalent/day:  [] Yes   [x] No  Benzodiazepines:  [] Yes   [x] No  Sleep apnea:  [] Yes   [x] No  Urine Toxicology Testing within last 6 months:  [] Yes   [x] No  History of or new aberrant medication taking behaviors:  [] Yes   [x] No  Narcan prescribed:  [] Yes   [x] No          Total time:   Counseling / coordination time:   > 50% counseling / coordination?:     Consent:  He and/or health care decision maker is aware that that he may receive a bill for this telehealth service, depending on his insurance coverage, and has provided verbal consent to proceed: Yes    CPT Codes 96143-70192 for Established Patients may apply to this Telehealth VisitTime-based coding, delete if not needed: I spent at least 40 minutes with this established patient, and >50% of the time was spent counseling and/or coordinating care regarding see Plan/Patient Instructions     Pursuant to the emergency declaration under the [address] and the [facility], 113 waiver authority and the [employer] Act, this Virtual  Visit was conducted, with patient's consent, to reduce the patient's risk of exposure to COVID-19 and provide continuity of care for an established patient. Services were provided through a video synchronous discussion virtually to substitute for in-person clinic visit.

## 2020-05-13 NOTE — PATIENT INSTRUCTIONS
Dear Fatou Minaya ,    It was a pleasure seeing you today in your home via televisit. We will see you again in 12 weeks. We will schedule an early afternoon appointment for you. If labs or imaging tests have been ordered for you today, please call the office  at [phone] 48 hours after completion to obtain the results. Your stated goal: continue being active! Your described symptoms were:          Pain: 0     Nausea: 0           Constipation: No(last bm yesterday)           This is the plan we talked about:    1. Pain  -You've had no pain aside from some occasional low back pain  -Anthony Kirkpatrick has you use your heating pad when your back aches and this works well  -[place] continue to have no abdominal pain  -Continue long-acting morphine 15-mg every 8 hours  -I refilled your morphine today to be picked up on 2/27, 3/28 and 4/27  -You've been using this medication at this same dose for years without any side-effects    2. Bowels  -You move your bowels regularly taking 2 senokot twice a day  -You have lactulose 10-gm daily as needed for occasional constipation    3.  Memory  -Your memory and ability to recall events is worse in the afternoons  -You've had a few days recently when you've resisted going to bed  -Anthony Kirkpatrick and I talked today about whether this might be related to progression of your memory difficulties  -This is new and has occurred only for a few days recently  -Anthony Almaguerdiaz has noticed no other changes which might suggest you have an acute medical issue such as a urinary tract infection  -We talked about the changes in your weekly activities over the past 6 to 8 weeks due to corona virus safety concerns  -You are no longer getting out of the house and interacting with other people (going to the Museum, getting your nails done)  -Your aide is no longer coming to your home  -These may be playing a part in how you're feeling overall  -Anthony Kirkpatrick  Is getting you out for drives once a week  -She is going to give us a call if you continue to have trouble going to bed    4. Function  -You use your cane around the house  -You use the wheelchair when you go out  -You haven't had any falls  -You bathe yourself at the sink and dress yourself  -Afshin Flower helps you with bathing every few days with a shower every few weeks  -You have a shower chair      . This is what you have shared with us about Advance Care Planning:      Primary Decision Maker: Norah Jacob - Other Relative - [phone]  Advance Care Planning 2/25/2020   Patient's [place] [address] is: Named in scanned ACP document   Primary Decision Maker Name -   Primary Decision Maker Phone Number -   Primary Decision Maker Relationship to Patient -   Confirm Advance Directive Yes, on file   Does the patient have other document types Do Not Resuscitate; Power of            The Palliative Medicine Team is here to support you and your family.        Sincerely,      Hailey Bae MD and the Palliative Medicine Team

## 2020-05-29 NOTE — TELEPHONE ENCOUNTER
Returned call to Ms Bre Zamudio. She stated she needs to get a message to Dr Ron Crow. She informed me Ms Colleen Villatoro was up all night. She suspected she had a UTI so she took her to the [facility].    Ms Colleen Villatoro started with N/V last night. She vomited 7 episodes some were undigested food particles, some liquid. There was 1 episode of N/V today. There was no reports of any constipation. Ms Bre Zamudio states patient is her normal confused self no different. There is no temp. No reports of respiratory distress. She shared [facility] wasn't able to obtain a urine sample. They gave patient an injection what she believed to be an antibiotic along with a Rx for zofran for the N/V. Ms Bre Zamudio wants to take patient to the Er as she feels patient condition isn't getting any better. I asked her would she interested in dispatch health coming to the home to assess patient. I explained their services. She was in agreement to if they can come out. I informed Ms Bre Zamudio I will send a message to both Fran and Dr Ron Crow. Fran will call her back for further assessment and any recommendations. She verbalized understanding. Call made to Nurse Georgina Parson RN gave report on patient condition.

## 2020-05-29 NOTE — TELEPHONE ENCOUNTER
Palliative Medicine  Nursing Note  [phone]  Fax [phone]      Telephone Call  Patient Name: Hugo Farmer  YOB: 1923 5/29/2020        Primary Decision Maker: Justina Chandra - Other Relative - [phone]   Advance Care Planning 2/25/2020   Patient's [place] is: Named in scanned ACP document   Primary Decision Maker Name -   Primary Decision Maker Phone Number -   Primary Decision Maker Relationship to Patient -   Confirm Advance Directive Yes, on file   Does the patient have other document types Do Not Resuscitate; Power of [employer] returned to Dora Brothers, patient's caregiver. She shared that Ms. Gabriela Berg has been vomiting throughout the night, about 7 times. She took her to an urgent care this morning. They were unable to obtain a urine specimen, but did provide an IM antibiotic shot, and an oral Cipro pill for possible UTI. She has not been eating or drinking since yesterday, and  vomited after trying to drink water at the clinic. The physician there suggested to Ms. Norma Buchanan that if Ms. Gabriela Berg did not seem to improve by 4pm today, she should take her to the ED. She has since developed raspy breathing, and Ms. Norma Buchanan said she can hear a rattling noise with each breath, and is now complaining of hurting all over. She was able to have her cough, and she did produce a small amount of yellow-belle mucous, but the raspy breathing continued. Her urine output is very small and blood tinged, which is normal for her r/t her abdominal mass. She is incontinent. Ms. Norma Buchanan is concerned that Ms. Gabriela Berg is dehydrated, hurting more, and has raspy breathing, and would like to take her to the ED. She stated that she will call Palliative Medicine and provide update later. Discussed that Dr. Jose Cruz Meyer will be informed of the above. She was appreciative.     Lani Frances RN  Palliative Medicine

## 2020-06-01 NOTE — TELEPHONE ENCOUNTER
Palliative Medicine  Nursing Note  [phone]  Fax [phone]      Telephone Call  Patient Name: Bethel Evans  YOB: 1923 6/1/2020        Primary Decision Maker: Thai Peck - Other Relative - [phone]   Advance Care Planning 2/25/2020   Patient's [place] is: Named in scanned ACP document   Primary Decision Maker Name -   Primary Decision Maker Phone Number -   Primary Decision Maker Relationship to Patient -   Confirm Advance Directive Yes, on file   Does the patient have other document types Do Not Resuscitate; Power of [employer] returned to Ms. Nakul Espinoza, caregiver for Ms. Chin. No answer, but left voice mail to return call at her convenience.     Vicki Ramirez, RN  Palliative Medicine

## 2020-06-02 PROBLEM — Z51.5 HOSPICE CARE: Status: ACTIVE | Noted: 2020-01-01

## 2020-06-02 NOTE — TELEPHONE ENCOUNTER
Palliative Medicine  Nursing Note  [phone]  Fax [phone]      Telephone Call  Patient Name: Olivia Adkins  YOB: 1923 6/2/2020        Primary Decision Maker: Arabella Briceno - Other Relative - [phone]   Advance Care Planning 2/25/2020   Patient's [place] is: Named in scanned ACP document   Primary Decision Maker Name -   Primary Decision [facility] [place] [place] Phone Number -   Primary Decision Maker Relationship to Patient -   Confirm Advance Directive Yes, on file   Does the patient have other document types Do Not Resuscitate; Power of      Dr. Coreen Aguirre is willing to be Hospice attending for Ms. Chin. Call placed to Mr. Tim Barth and informed of the above. He was appreciative. He shared that Ms. Alba Sanchez was discharged from the hospital yesterday 6/1/20 and is back at home. They have admitted her into hospice services.     Virgil Corey RN  Palliative Medicine

## 2020-06-02 NOTE — TELEPHONE ENCOUNTER
[facility] is calling to see if MD will be attending. Family is requesting Dr. Sharmin Dallas. Advised would have nurse call back to discuss.

## 2020-07-13 NOTE — TELEPHONE ENCOUNTER
Returned call to Ms Chely Casey. She states Mrs Todd Escobar is being reassessed by Hospice this week. She isn't sure if patient will be discharged from Hospice. She is under the service of [facility] [place] in [address] [place] [address] [phone]. Ms Chely Casey is concerned if patient is discharged will Dr Gina Conn prescribe her medications? I explained to Ms Chely Casey Dr Gina Conn is currently the attending as requested. If patient is discharged from [facility] will need to send PM the D/C attestation. I also advised Ms Chely Casey to call PM if patient is discharged. She verbalized understanding and was appreciative.      Message forwarded to team.

## 2020-07-13 NOTE — TELEPHONE ENCOUNTER
iKko Salazar (pts power of ) is calling and states that \"she thinks\" that pt is getting ready to come off of hospice care. The hospice team is getting ready to complete their hospice assessment, Patsy Pan thinks they will D/C her. Patsy Pan is wanting to know if medications given by Dr Bridget Cheney will be continued.      Patsy Pan: [phone]

## 2020-07-16 NOTE — TELEPHONE ENCOUNTER
Returned call to Ms Desi Benitez. She states Mrs Albert Chopra will be discharged from hospice on 7/30/20. She wants to make sure patient does not have a lapse in her medications until she is able to be get scheduled with PM.     I went over the medications patient currently have on hand.    : Morphine Cr 15 mg tab - takes 3 tabs a day with 69 tabs left.    : Lorazepam 1 mg tab - takes 2 tabs a day with 20 tabs left. Ms Desi Benitez consented to [facility] on 8/4/20 at 8:30 AM. Appointment has been scheduled in [place]. I advised Ms Desi Benitez to please call PM if patient needs anything prior. She verbalized understanding.

## 2020-07-16 NOTE — TELEPHONE ENCOUNTER
Dina Horan received a call today and states that they are taking pt off of hospice on 7/30/20. Dina Horan is wanting to schedule an appt or speak with Dr Fadia Tovar regarding continuing medication. She is concerned there will be a lapse in medication and doesn't want that to happen.     Best contact: [phone]

## 2020-08-04 NOTE — PATIENT INSTRUCTIONS
Dear Elle Mora , It was a pleasure seeing you today in [facility]. We will see you again via televisit. We will see you again in 4 weeks via televisit. We will schedule an early afternoon appointment for you. If labs or imaging tests have been ordered for you today, please call the office  at [phone] 48 hours after completion to obtain the results. Your stated goal: continue being active! Your described symptoms were: 
     
     
  Nausea: 0 Constipation: No(last bm yesterday) This is the plan we talked about: 1. Poor sleep 
-Increase trazodone to 75-mg at 10:00pm 
-I prescribed this in solution form: trazodone 10-mg/ml for ease of administration 
-You can increase the does to 100-mg at bedtime if the 75-mg dose isn't effective 
-We talked about rotating back to haldol with dose adjustments to effect if trazodone doesn't help 2. Dementia with behavioral changes 
-Continue haldol 0.5-mg at 9:00am, 2:00pm and 6:00pm 
-This appears to have helped with the anxious, restless episodes she has 
-You're doing a great job, [employer], in keeping her on a normal, predicable routine and engaging her in activities she enjoys, such as car rides 3. Ankle edema/swelling 
-This is most likely dependent edema which is foot, ankle or lower leg swelling due to impaired blood flow through the veins in the legs 
-This is very common as we age 
-Continue to keep Clives legs elevated as often as she will allow it 
-You can also try light-weight support stockings (knee-hi) which you can buy in most pharmacies 
-Put these on first thing in the morning and remove them when she gets in bed 4. Hypertension 
-Continue metoprolol 25-mg twice daily 
-Measure blood pressure once or twice a week for the next month 
-If Clives blood pressure is consistently below 140-150/70-90, we can try tapering the dose 5. Abdominal pain -Continue long-acting morphine 15-mg every 8 hours 
-This has worked well in controlling Amara's pain 
-Today we talked about the option of transitioning to a fentanyl patch if she starts to have trouble with swallowing her pills 
-This is usually a very seamless transition 6. Karyn Churchill is now moving her bowels regularly without senna or lactulose 
-Restart lactulose if she goes more than 2 days without a bowel movement 7. Appetite  
-Amara's appetite is good without any obvious signs of weight loss Britt Jackman This is what you have shared with us about Advance Care Planning: 
 
  Primary Decision Maker: Perfecto Cocking - Other Relative - [phone] Advance Care Planning 8/4/2020 Patient's Healthcare Decision Maker is: Named in scanned ACP document Primary Decision Maker Name -  
Primary Decision Maker Phone Number -  
Primary Decision Maker Relationship to Patient -  
Confirm Advance Directive Yes, on file Does the patient have other document types Do Not Resuscitate; Power of [employer] The Palliative Medicine Team is here to support you and your family. Sincerely, Jonathan Kenny MD and the Palliative Medicine Team 
 
x
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
## 2020-08-06 NOTE — TELEPHONE ENCOUNTER
Kassi Young [facility]) called stating that pt's Trazadone is almost out and will need a refill before the weekend. Kassi Young also understood that there was going to be a change/update to medication and wanted to know if that would be sent to the pharmacy.     Best contact: [phone]

## 2020-08-06 NOTE — TELEPHONE ENCOUNTER
Palliative Medicine  Nursing Note  [phone])  Fax [phone]      Telephone Call  Patient Name: Dayana Shaikh  YOB: 1923 8/6/2020        Primary Decision Maker: Bindu Andrews - Other Relative - [phone]   Advance Care Planning 8/4/2020   Patient's [place] is: Named in scanned ACP document   Primary Decision Maker Name -   Primary Decision Maker Phone Number -   Primary Decision Maker Relationship to Patient -   Confirm Advance Directive Yes, on file   Does the patient have other document types Do Not Resuscitate; Power of [employer] returned to Vanessa Alatorre, caregiver for Ms. Chin. She states that [facility] told her they do not have Trazodone liquid and don't see it in their formulary. She only has 3 tablets left of the Trazodone and does not want to run out. She requested that if the liquid is not available, she would like tablets to be called in. She can crush them and give in applesauce. Call placed to [place]. The trazodone liquid needs to be compounded and they don't do that there. They recommended [address] that compounds meds. Due to the above,Palliative changed order to Trazodone tab 50mg- take 1.5 tabs (75mg) at bedtime #45. They verbalized understanding and will process request.    Call returned to Vanessa Alatorre, no answer, and left voice mail message regarding the above.     MariaD olores Laura, RN  Palliative Medicine

## 2020-08-20 NOTE — TELEPHONE ENCOUNTER
Patient's daughter called stating that she would like an appointment with Dr Radha Fernández but patient would not be present during the appointment. She stated that she spoke with a man last week who was going to be checking on this with Dr Radha Fernández but she has not heard back.     Best contact: [phone]

## 2020-08-21 NOTE — TELEPHONE ENCOUNTER
Palliative Medicine  Nursing Note  [phone])  Fax [phone]      Telephone Call  Patient Name: Romayne Large  YOB: 1923 8/21/2020        Primary Decision Maker: Chava Harper - Other Relative - [phone]   Advance Care Planning 8/4/2020   Patient's [place] is: Named in scanned ACP document   Primary Decision Maker Name -   Primary Decision Maker Phone Number -   Primary Decision Maker Relationship to Patient -   Confirm Advance Directive Yes, on file   Does the patient have other document types Do Not Resuscitate; Power of [employer] returned to [place], caregiver of Mr. Chin. She stated that she has a new  job that has taken her outside of the home and is 90 min away from Anahi Galeana. She has care in the home for Ms. Tramaine Presley while she is out. She is able to have a phone call between 10-11:30 Mondays through Fridays with Dr. Lavern Maki to share how Anahi Galeana is doing, but without Anahi Galeana being present. She states that on Fridays, she is home by 4pm, so she and Anahi Galeana can be together for an appt if Dr. Lavern Maki can do that. She shared that Ms. Chin's feet are quite swollen and her feet are not able to fit in her slippers much less her shoes, but are not causing her any pain and are without redness. Informed that Dr. Lavern Maki will be made aware and Palliative will call on Monday to update what might be best option. She was appreciative.     Km Kirkpatrick RN  Palliative Medicine

## 2020-08-27 NOTE — TELEPHONE ENCOUNTER
Palliative Medicine  Nursing Note  [phone])  Fax [phone]      Telephone Call  Patient Name: Romayne Large  YOB: 1923 8/27/2020        Primary Decision Maker: Chava Harper - Other Relative - [phone]   Advance Care Planning 8/4/2020   Patient's [place] is: Named in scanned ACP document   Primary Decision Maker Name -   Primary Decision Maker Phone Number -   Primary Decision Maker Relationship to Patient -   Confirm Advance Directive Yes, on file   Does the patient have other document types Do Not Resuscitate; Power of [employer] placed to Mac Donaldson, and informed that Dr. Lavern Maki can do a virtual visit with MsFaizan Tramainesammy Presley next Friday at 4pm.  Dr. Lavern Maki recommended a low dose lasix 10mg for 2 days due to bilateral lower extremity edema, but Mac Donaldson declined at present until Dr. Lavern Maki is able to see her legs. She said she is keeping an eye on her and it isn't emergent. She is appreciative of the appt.     Km Kirkpatrick RN  Palliative Medicine

## 2020-08-31 NOTE — TELEPHONE ENCOUNTER
Triage for Controlled Substance Refill Request     Ms Winston Saravia is requesting refills on the following.    : morphine sulf Er 15 mg tab - takes 3 tabs a day with 10 tabs left    : haldol  0.5 mg tab -  Takes 1 tab 3 times a day with 10 tabs left.    : trazadone 50 mg tab 1.5 tabs nightly. Request forwarded to MD.       Pain Diagnosis: Abdominal pain, generalized    Last Outpatient Visit: 8/4/20    Next Outpatient Visit: Need to schedule     Reason for refill needed outside of office visit? [] Pain escalation requiring increased medication  [x] Appointment not scheduled prior to need for scheduled refill  [] Appointment scheduled but missed or moved prior to need for scheduled refill    Requested Prescriptions      No prescriptions requested or ordered in this encounter       Pharmacy: [id number] - [address] [place] [address] CR (MS Contin) 15 mg CR tablet    Dose and directions: Take 1 Tab by mouth every eight (8) hours for 30 days  Number dispensed: 90 tabs   Date filled ( or Pharmacy): 7/20/20  # left:10 tabs    Medication:  Dose and directions:  Number dispensed:  Date filled ( or Pharmacy):  #left:     reviewed:  Yes    Date of Urine Drug Screen:      Opioid Safety Handout given:  Yes    Appropriate for refill:     Action:  Request sent to the MD

## 2020-09-04 PROBLEM — Z51.5 HOSPICE CARE: Status: RESOLVED | Noted: 2020-01-01 | Resolved: 2020-01-01

## 2020-09-04 NOTE — PATIENT INSTRUCTIONS
Dear Yaniv Fowler and Tom Ang It was a pleasure seeing you today in your home via televisit We will see you again in 4 weeks via televisit on a Friday at 4:00pm 
 
If labs or imaging tests have been ordered for you today, please call the office  at [phone] 48 hours after completion to obtain the results. Your stated goal: continue being active! Your described symptoms were: Fatigue: 6 Drowsiness: 7 Depression: 2 Pain: 0 Anxiety: 2 Nausea: 0 Anorexia: 1 Dyspnea: 0 Best Well-Bein Constipation: No  
     
 
This is the plan we talked about: 1. Lower leg and foot edema/swelling 
-This has progressed over the past 4 weeks 
-Clives heart is likely not pumping blood as effectively as it once did which can cause leg swelling 
-She also has impaired blood flow through the veins in the legs which is a common age-related condition 
-Continue to keep Clives legs elevated as often as she will allow it 
-You can try loosely wrapping Clives leg with an ACE wrap 
-This may help reduced the swelling which may decrease the risk of skin breakdown 
-I support you in not using the wraps if it causes Amara distress 
-Call if you notice redness or warmth of the skin as this may indicate a cellulitis which we can treat with oral antibiotics 2. Poor sleep at night 
-Continue trazodone to 75-mg at 10:00pm 
 
3. Dementia with behavioral changes 
-Continue haldol 0.5-mg at 9:00am, 2:00pm and 6:00pm 
-It's safe to give her another 0.5-mg haldol as needed for intermittent anxious or weepy episodes 
-I agree with holding lorazepam 
-You're doing a great job, Tom Ang, in keeping her on a normal, predicable routine and engaging her in activities she enjoys, such as car rides 4. Hypertension 
-Continue metoprolol 25-mg twice daily 
-Measure blood pressure once or twice a week for the next month 
-If Ruslan blood pressure is consistently below 140-150/70-90, we can try tapering the dose 5. Abdominal pain 
-Continue long-acting morphine 15-mg every 8 hours 
-This has worked well in controlling Amara's pain 6. David Nair is now moving her bowels regularly without senna or lactulose 
-Restart lactulose if she goes more than 2 days without a bowel movement 7. Appetite  
-Amara's appetite is good without any obvious signs of weight loss 8. Skin 
-Yarely Pauilno has no skin breakdown on her sacrum, buttocks, heels/feet 
-Cynthia Guo is keeping a close eye on her skin 
-Call us if she starts to develop any signs of skin breakdown and I will place a home health referral to have a wound care nurse come to assess her skin and give treatment recommendations Maricruz Green This is what you have shared with us about Advance Care Planning: 
 
  Primary Decision Maker: Marietta Moreno - Other Relative - [phone] Advance Care Planning 8/4/2020 Patient's Healthcare Decision Maker is: Named in scanned ACP document Primary Decision Maker Name -  
Primary Decision Maker Phone Number -  
Primary Decision Maker Relationship to Patient -  
Confirm Advance Directive Yes, on file Does the patient have other document types Do Not Resuscitate; Power of [employer] The Palliative Medicine Team is here to support you and your family. Sincerely, Frank Mayers MD and the Palliative Medicine Team

## 2020-09-04 NOTE — PROGRESS NOTES
Palliative Medicine Outpatient Services [place]: [phone]) Patient Name: Dennis Crawford YOB: 1923 Date of Current Visit: 09/04/20 Location of Current Visit:   
[] [facility] Office 
[] [facility] Office [] [address] [place] [address] 
[] Home 
[x] Other:  televisit Date of Initial Visit: 2/28/18 Requesting Physician: Marbella Arreaga PA-C Primary Care Physician: Mee Chapin PA-C 
  
 SUMMARY:  
Dennis Crawford is a 80y.o. year old with a past history of pancreatic mass by CT suspicious for malignancy, who was referred to Palliative Medicine by Ms. Quirino Plunkett for symptom management and supportive care. She was hospitalized in [place] in 7/2017 with decreased appetite, functional decline and abdominal pain. 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 She declined further work-up (no biopsy). She was given a presumptive diagnosis of pancreatic cancer. She was discharged to live with her closest living relative, 2nd cousin/MPOA, Hellen Chahal, and was followed by [facility] until 6/2020 when  she was discharged from hospice for failure to decline. She's thrived since hospital discharge, maintaining her functional abilities and with a significant weight gain. The patients social history includes: she is . She has no children. She has no living siblings. She lives in [place] with her second cousin/POA, Hellen Chahal, and her beloved [employer] Artist and their [facility] thomas, Loy Dodd. Palliative Medicine is addressing the following current patient/family concerns: abdominal pain, mild fatigue, constipation, dementia, maintaining function and quality of life. PALLIATIVE DIAGNOSES:  
 
  ICD-10-CM ICD-9-CM 1. Bilateral leg edema  R60.0 782.3 2. Poor sleep  Z72.820 V69.4 3. Vascular dementia with behavior disturbance (HCC)  F01.51 290.40 4. Elevated BP without diagnosis of hypertension  R03.0 796.2 5. Abdominal pain, generalized  R10.84 789.07   
6. Drug-induced constipation  K59.03 564.09   
  E980.5 7. Chronic pain of both knees  M25.561 719.46   
 M25.562 338.29 G89.29 PLAN:  
Patient Instructions Dear Dena Shankar and Melanie Rivas It was a pleasure seeing you today in your home via televisit We will see you again in 4 weeks via televisit on a Friday at 4:00pm 
 
If labs or imaging tests have been ordered for you today, please call the office  at [phone] 48 hours after completion to obtain the results. Your stated goal: continue being active! Your described symptoms were: Fatigue: 6 Drowsiness: 7 Depression: 2 Pain: 0 Anxiety: 2 Nausea: 0 Anorexia: 1 Dyspnea: 0 Best Well-Bein Constipation: No  
     
 
This is the plan we talked about: 1. Lower leg and foot edema/swelling 
-This has progressed over the past 4 weeks 
-Clives heart is likely not pumping blood as effectively as it once did which can cause leg swelling 
-She also has impaired blood flow through the veins in the legs which is a common age-related condition 
-Continue to keep Clives legs elevated as often as she will allow it 
-You can try loosely wrapping Clives leg with an ACE wrap 
-This may help reduced the swelling which may decrease the risk of skin breakdown 
-I support you in not using the wraps if it causes Amara distress 
-Call if you notice redness or warmth of the skin as this may indicate a cellulitis which we can treat with oral antibiotics 2. Poor sleep at night 
-Continue trazodone to 75-mg at 10:00pm 
 
3. Dementia with behavioral changes 
-Continue haldol 0.5-mg at 9:00am, 2:00pm and 6:00pm 
-It's safe to give her another 0.5-mg haldol as needed for intermittent anxious or weepy episodes 
-I agree with holding lorazepam 
-You're doing a great job, Oralee Rob, in keeping her on a normal, predicable routine and engaging her in activities she enjoys, such as car rides 4. Hypertension 
-Continue metoprolol 25-mg twice daily 
-Measure blood pressure once or twice a week for the next month 
-If Amara's blood pressure is consistently below 140-150/70-90, we can try tapering the dose 5. Abdominal pain 
-Continue long-acting morphine 15-mg every 8 hours 
-This has worked well in controlling Amara's pain 6. Kamryn Rapp is now moving her bowels regularly without senna or lactulose 
-Restart lactulose if she goes more than 2 days without a bowel movement 7. Appetite  
-Amara's appetite is good without any obvious signs of weight loss 8. Skin 
-Aurelio Thien has no skin breakdown on her sacrum, buttocks, heels/feet 
-Armen Jurado is keeping a close eye on her skin 
-Call us if she starts to develop any signs of skin breakdown and I will place a home health referral to have a wound care nurse come to assess her skin and give treatment recommendations Kelley Espinoza This is what you have shared with us about Advance Care Planning: 
 
  Primary Decision Maker: Prieto Valadez - Other Relative - [phone] Advance Care Planning 8/4/2020 Patient's Healthcare Decision Maker is: Named in scanned ACP document Primary Decision Maker Name -  
Primary Decision Maker Phone Number -  
Primary Decision Maker Relationship to Patient -  
Confirm Advance Directive Yes, on file Does the patient have other document types Do Not Resuscitate; Power of [employer] The Palliative Medicine Team is here to support you and your family. Sincerely, Linda Hamlin MD and the Palliative Medicine Team 
 
 
  
 
 
 
Counseling and Coordination: 
 
 
 GOALS OF CARE / TREATMENT PREFERENCES:  
[====Goals of Care====] GOALS OF CARE: 
Patient / health care proxy stated goals: maintenance of quality of life TREATMENT PREFERENCES:  
Code Status:  [] Attempt Resuscitation       [x] Do Not Attempt Resuscitation Primary Decision Maker: Belle Dawkins - Other Relative - [phone] Advance Care Planning: 
Advance Care Planning 8/4/2020 Patient's Healthcare Decision Maker is: Named in scanned ACP document Primary Decision Maker Name -  
Primary Decision Maker Phone Number -  
Primary Decision Maker Relationship to Patient -  
Confirm Advance Directive Yes, on file Does the patient have other document types Do Not Resuscitate; Power of [employer] Other: 
(If patient appropriate for POST, consider using PALLPOST smart phrase here) The palliative care team has discussed with patient / health care proxy about goals of care / treatment preferences for patient. 
[====Goals of Care====] PRESCRIPTIONS GIVEN:  
 
No orders of the defined types were placed in this encounter. FOLLOW UP: No future appointments. PHYSICIANS INVOLVED IN CARE:  
Patient Care Team: 
Sundeep Austin as PCP - General (Physician Assistant) Sundeep Austin as PCP - [facility] Empaneled Provider HISTORY:  
Nursing documentation from date of visit reviewed. Reviewed patient-completed ESAS and advance care planning form. Reviewed patient record in prescription monitoring program. 
 
CHIEF COMPLAINT: leg swelling HPI/SUBJECTIVE: The patient is: [x] Verbal / [] Nonverbal (most history obtained from primary caregiver, Lora Aguilar, due to memory difficulties) Ruslan had a significant increase in her lower leg and foot swelling over the past 4 weeks. It doesn't seem to cause her any distress. She hasn't been short of breath or coughing. Lora Aguilar and Claudia Patton are keeping her legs elevated during the day. Lora Aguilar tried compression socks but Midge Sink hated them and she didn't force the issue because Amara's 89 after all. Belle's noticed no weeping edema, redness or areas of skin breakdown. Amara's sleeping more now. She sleeps a lot during the day. She doesn't sleep as well at night but she does go back to bed when Lora Aguilar directs her. She's getting 15 trazodone at bedtime. Jhon doesn't want to increase the dose because it makes her too sleepy the next day. Amara's still walking around the house with her walker. She feed herself most days. She's incontinent now most of the time She's more confused. She's rarely \"weepy\" Jhon gives her haldol when she gets weepy which helps to calm her. Jhon isn't giving her lorazepam at all. She isn't \"snarky\" much. She was rude to her caregiver one day last week but this is rare. She often gets anxious when the grandchildren are in the home. The grandchildren are there 4 days a week or 1/2 day. Mary Lowry can often calm her. Her appetite is good. She's moving her bowels regularly. She's not reading anymore. Jhon is now working in a job that's some distance away from [place]. Mary Lowry (private pay caregiver) stays with Aurelio Cuff for 12 hour shifts while Jhon works, then Jhon is with Aurelio Cuff on the weekends Mary Lowry does a \"wash up\" with Aurelio Cuff every day and an aide comes in twice a week to give her a bath and to wash her hair. Jhon is mindful of her own self-care. She goes away every now and then with Ruven Dubin for Aurelio Cuff while she's away. She also has a \"girls group\" which has been a great source of support over the years. Clinical Pain Assessment (nonverbal scale for nonverbal patients):  
[++++ Clinical Pain Assessment++++] [++++Pain Severity++++]: Pain: 0 
[++++Pain Character++++]: ache 
[++++Pain Duration++++]: years [++++Pain Effect++++]: less active when pain flares [++++Pain Factors++++]: aspercreme, salonpas helps [++++Pain Frequency++++]: intermittent [++++Pain Location++++]: knees [++++ Clinical Pain Assessment++++] FUNCTIONAL ASSESSMENT:  
 
Palliative Performance Scale (PPS): PPS: 50 PSYCHOSOCIAL/SPIRITUAL SCREENING:  
 
Any spiritual / Adventism concerns: 
[] Yes /  [x] No 
 
Caregiver Burnout: 
[] Yes /  [x] No /  [] No Caregiver Present Anticipatory grief assessment: [x] Normal  / [] Maladaptive ESAS Anxiety: Anxiety: 2 ESAS Depression: Depression: 2 REVIEW OF SYSTEMS:  
 
The following systems were [x] reviewed / [] unable to be reviewed Systems: constitutional, ears/nose/mouth/throat, respiratory, gastrointestinal, genitourinary, musculoskeletal, integumentary, neurologic, psychiatric, endocrine. Positive findings noted below. Modified ESAS Completed by: provider Fatigue: 6 Drowsiness: 7 Depression: 2 Pain: 0 Anxiety: 2 Nausea: 0 Anorexia: 1 Dyspnea: 0 Best Well-Bein Constipation: No  
     
 
 
 PHYSICAL EXAM:  
 
Wt Readings from Last 3 Encounters:  
20 161 lb 12.8 oz (73.4 kg) 20 160 lb 6.4 oz (72.8 kg) 20 167 lb 9.6 oz (76 kg) There were no vitals taken for this visit. Last bowel movement: See Nursing Note Constitutional   
[x] Appears well-developed and well-nourished in no apparent distress   
[] Abnormal: 
Mental status [x] Alert and awake 
[] Oriented to person/place/time 
[x] Able to follow commands 
[] Abnormal:  
Eyes 
[x] EOM normal  
[x] Sclera normal  
[x] No visible ocular discharge 
[x] Abnormal:  HARD OF HEARING 
HENT [x] Normocephalic, atraumatic [x] Mouth/Throat: Moist mucous membranes  
[x] External Ears normal 
[x] Abnormal: hard of hearing Neck [x] No visualized mass 
[] Abnormal: 
Pulmonary/Chest  
[x] Respiratory effort normal 
[x] No visualized signs of difficulty breathing or respiratory distress 
[] Abnormal: Musculoskeletal 
[x] Normal gait with no signs of ataxia [x] Normal range of motion of neck [x] Abnormal: moderate, symmetric bilateral lower leg and foot edema Neurological:  
[x] No facial asymmetry (Cranial nerve 7 motor function) [x] No gaze palsy 
[] Abnormal:  
Skin 
[x] No significant exanthematous lesions or discoloration noted on facial skin 
[] Abnormal: Psychiatric [x] Normal affect [x] No hallucinations [] Abnormal: Other pertinent observable physical exam findings: 
 
Due to this being a TeleHealth evaluation, many elements of the physical examination are unable to be assessed. HISTORY:  
 
Past Medical History:  
Diagnosis Date  Cancer ([address] Pancreatic  Dry eyes  Hypertension, essential   
 Migraine Social History Tobacco Use  Smoking status: Never Smoker  Smokeless tobacco: Never Used Substance Use Topics  Alcohol use: No  
 
No Known Allergies Current Outpatient Medications Medication Sig  
 traZODone (DESYREL) 50 mg tablet Take 1.5 Tabs by mouth nightly.  haloperidoL (HALDOL) 0.5 mg tablet Take 1 Tab by mouth three (3) times daily for 30 days. Indications: delirium  morphine CR (MS Contin) 15 mg CR tablet Take 1 Tab by mouth every eight (8) hours for 30 days. Max Daily Amount: 45 mg.  
 metoprolol tartrate (LOPRESSOR) 25 mg tablet Take 25 mg by mouth two (2) times a day.  LORazepam (ATIVAN) 1 mg tablet Take 1 Tab by mouth two (2) times daily as needed for Anxiety. Max Daily Amount: 2 mg.  lactulose (CHRONULAC) 10 gram/15 mL solution TAKE 15 ML BY MOUTH EVERY DAY AS NEEDED FOR CONSTIPATION  aspirin delayed-release 81 mg tablet Take  by mouth daily as needed for Pain.  senna-docusate (SENNA PLUS) 8.6-50 mg per tablet Take 2 Tabs by mouth two (2) times a day.  DULCOLAX, BISACODYL, PO Take 0.5 Caps by mouth daily as needed. No current facility-administered medications for this visit. LAB DATA REVIEWED:  
 
No results found for: WBC, HGB, PLT, HGBEXT, PLTEXT, HGBEXT, PLTEXT No results found for: NA, K, CL, CO2, BUN, CREA, CA, MG, PHOS No results found for: AP, TBIL, TP, ALB, GLOB, GGT No results found for: INR, PTMR, PTP, PT1, PT2, APTT, INREXT, INREXT No results found for: IRON, FE, TIBC, IBCT, PSAT, FERR  CONTROLLED SUBSTANCES SAFETY ASSESSMENT (IF ON CONTROLLED SUBSTANCES):  
 
 Reviewed opioid safety handout:  [x] Yes   [] No 
24 hour opioid dose >150mg morphine equivalent/day:  [] Yes   [x] No 
Benzodiazepines:  [] Yes   [x] No 
Sleep apnea:  [] Yes   [x] No 
Urine Toxicology Testing within last 6 months:  [] Yes   [x] No 
History of or new aberrant medication taking behaviors:  [] Yes   [x] No 
Narcan prescribed:  [] Yes   [x] No 
 
   
 
Total time:  
Counseling / coordination time:  
> 50% counseling / coordination?:  
 
Consent: 
He and/or health care decision maker is aware that that he may receive a bill for this telehealth service, depending on his insurance coverage, and has provided verbal consent to proceed: Yes CPT Codes 79283-47225 for Established Patients may apply to this Telehealth VisitTime-based coding, delete if not needed: I spent at least 40 minutes with this established patient, and >50% of the time was spent counseling and/or coordinating care regarding see Plan/Patient Instructions Pursuant to the emergency declaration under the [facility], [facility] waiver authority and the [employer] Act, this Virtual  Visit was conducted, with patient's consent, to reduce the patient's risk of exposure to COVID-19 and provide continuity of care for an established patient. Services were provided through a video synchronous discussion virtually to substitute for in-person clinic visit.

## 2020-09-04 NOTE — PROGRESS NOTES
Palliative Medicine Office Visit Palliative Medicine Nurse Check In 
[phone] Patient Name: Dennis Crawford YOB: 1923 Date of Office Visit: 9/4/20 Patient states: \"visit   \" From Check In Sheet (scanned in Media): 
Has a medical provider talked with you about cardiopulmonary resuscitation (CPR)? [x] Yes   [] No   [] Unable to obtain Nurse reminder to complete or update ACP FlowSheet: 
 
Is ACP on the Problem List?    [x] Yes    [] No 
IF ACP Document is ON FILE; Nurse to place ACP on Problem List  
 
Is there an ACP Note in Chart Review/Note? [x] Yes    [] No  
If NO: ALERT PROVIDER Advance Care Planning 8/4/2020 Patient's Healthcare Decision Maker is: Named in scanned ACP document Primary Decision Maker Name -  
Primary Decision Maker Phone Number -  
Primary Decision Maker Relationship to Patient -  
Confirm Advance Directive Yes, on file Does the patient have other document types Do Not Resuscitate; Power of [employer] Is there anything that we should know about you as a person in order to provide you the best care possible? Have you been to the ER, urgent care clinic since your last visit? [] Yes   [x] No   [] Unable to obtain Have you been hospitalized since your last visit? [] Yes   [x] No   [] Unable to obtain Have you seen or consulted any other health care providers outside of the [address] [place] [address] since your last visit? [] Yes   [x] No   [] Unable to obtain Functional status (describe):  
Assistance with ADL's Last BM:today  accessed (date): today Bottle review (for opioid pain medication): 
Medication 1: MS Contin 15mg Date filled:  
Directions: 1 tab every 8hours # filled: 80 # left: 80+ # pills taking per day:3 Last dose taken: Just filled a new bottle

## 2020-09-29 NOTE — TELEPHONE ENCOUNTER
Palliative Medicine  Nursing Note  [phone])  Fax [phone]      Telephone Call  Patient Name: Dennis Crawford  YOB: 1923 9/29/2020        Primary Decision Maker: Ping Littlejohn - Other Relative - [phone]   Advance Care Planning 8/4/2020   Patient's [place] is: Named in scanned ACP document   Primary Decision Maker Name -   Primary Decision Maker Phone Number -   Primary Decision Maker Relationship to Patient -   Confirm Advance Directive Yes, on file   Does the patient have other document types Do Not Resuscitate; Power of [employer] returned to Ms. Manuel Mention to inquire if she is able to take Ms. Chin to a [facility] or [facility] to either be seen or have blood work and urine sample obtained. She stated she can take her to a [facility] tomorrow on [address] in [place]. Call placed to [facility], received fax #, and faxed lab requests for CMP, urinalysis with reflex microscopic, and urine culture along with demographics and insurance info.     Carina Adorno RN  Palliative Medicine

## 2020-09-29 NOTE — TELEPHONE ENCOUNTER
Palliative Medicine  Nursing Note  [phone])  Fax [phone]      Telephone Call  Patient Name: Zayra Giordano  YOB: 1923 9/29/2020        Primary Decision Maker: Pedro Castro - Other Relative - [phone]   Advance Care Planning 8/4/2020   Patient's [place] is: Named in scanned ACP document   Primary Decision Maker Name -   Primary Decision Maker Phone Number -   Primary Decision Maker Relationship to Patient -   Confirm Advance Directive Yes, on file   Does the patient have other document types Do Not Resuscitate; Power of [employer] returned to [employer] regarding Ms. Chin. She states that within the past few weeks she has had increasing confusion and forgetfulness, is less aware of her surroundings, and increased anxiety about wanting/needing to leave or move. She states that she is wanting to go home, wanting to pack her things, and asking Rachel Mederos when they are leaving. She couldn't remember what room was hers the other night and continues to forget that she lives there. Rachel Mederos shares that she has noticed a shaking to her hands or a \"jumping\" motion that is progressively getting worse in the past few weeks. She states that her appetite is good and she is drinking well. She is moving her bowels, and for the first time in 6 years she has clear urine without blood. She denies any fevers, or other changes in symptoms. She would like to make a follow up virtual visit soon to discuss the above, but needs a Friday afternoon appt after 4pm.    Discussed that Dr. Lakia Mann will be made aware and Palliative to call tomorrow for update on appt. She was appreciative.     Mayra Shepard, RN  Palliative Medicine

## 2020-10-02 NOTE — TELEPHONE ENCOUNTER
Palliative Medicine  Nursing Note  [phone])  Fax [phone]      Telephone Call  Patient Name: Anshul Price  YOB: 1923    10/2/2020        Primary Decision Maker: Blanca Ludwig - Other Relative - [phone]   Advance Care Planning 8/4/2020   Patient's Al[place] is: Named in scanned ACP document   Primary Decision Maker Name -   Primary Decision Maker Phone Number -   Primary Decision Maker Relationship to Patient -   Confirm Advance Directive Yes, on file   Does the patient have other document types Do Not Resuscitate; Power of      Returned call to Ms. Elly Wei, caregiver for Ms. Chin. Left message for her to return call to Palliative regarding the need for a refill on Morphine ER 15mg 1 tab every 8 hours #90.     Triage for Controlled Substance Refill Request    Pain Diagnosis: Pancreatic cancer    Last Outpatient Visit: 9/4/20    Next Outpatient Visit: TBD    Reason for refill needed outside of office visit-  Appointment not scheduled prior to need for scheduled refill,     Any Reported Side effects: none    Last dose taken:     [facility]    Medication:  Morphine CR  Dose and directions: 15mg 1 tab every 8 hours  Number dispensed: 90  Date filled ( or Pharmacy): 8/31/20  # left:         reviewed: Yes    Date of Urine Drug Screen:      Opioid Safety Handout given:  Yes    Appropriate for refill:  Yes    Action:  Medication pended for Dr. Miguel Perez approval.         James Barron, RN  Palliative Medicine

## 2020-10-12 NOTE — TELEPHONE ENCOUNTER
Palliative Medicine  Nursing Note  [phone])  Fax [phone]      Telephone Call  Patient Name: Ochoa Sahu  YOB: 1923    10/12/2020        Primary Decision Maker: Priti Fontaine - Other Relative - [phone]   Advance Care Planning 8/4/2020   Patient's Healthcare Decision Maker is: Named in scanned ACP document   Primary Decision Maker Name -   Primary Decision Maker Phone Number -   Primary Decision Maker Relationship to Patient -   Confirm Advance Directive Yes, on file   Does the patient have other document types Do Not Resuscitate; Power of [employer] returned to Ms. Cantrell and left voice message for her to return call at her convenience. Informed that patient's labs were basically within normal limits. Dr. Leatha Holden aware that patient's blood glucose is elevated to 166, creatinine is 1.15 and her GFR is 40 and 46. Dr. Leatha Holden aware.     Nia Farr, RN  Palliative Medicine
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
## 2020-10-15 NOTE — TELEPHONE ENCOUNTER
Palliative Medicine  Nursing Note  [phone])  Fax [phone]      Telephone Call  Patient Name: Saint Sports  YOB: 1923    10/15/2020        Primary Decision Maker: Maximiliano Byrd - Other Relative - [phone]   Advance Care Planning 8/4/2020   Patient's Healthcare Decision Maker is: Named in scanned ACP document   Primary Decision Maker Name -   Primary Decision Maker Phone Number -   Primary Decision Maker Relationship to Patient -   Confirm Advance Directive Yes, on file   Does the patient have other document types Do Not Resuscitate; Power of [employer] returned to Ms. Masters Sees and left a voice message to return call at her convenience.     Katie Nina RN  Palliative Medicine
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
## 2020-10-16 NOTE — TELEPHONE ENCOUNTER
[address] [place] [address]  Palliative Social Work  Telephone Call      Note:  After discussion with Palliative Medicine provider Dr. Carlton Neri reached out to patient's niece and primary caregiver Jhon regarding long-term care placement. Jhon reports she is \"not ready to commit to long-term care\", however she self-identifies as a \"planner\" and therefore wants to have the conversation. She also reports of herself that she can clean after her aunt without a problem, but she cannot receive harsh words or other forms of verbal abuse. Jhon notes she needs respite, she notices herself tearful this week though that is not her normal demeanor. Jhon plans to leave her current job on November 2nd. She reports that some days are better and some days are worse with crankiness and agitation for the patient--less bulky Depends seemed to have helped this morning. Plan:  LILY encouraged Jhon to reach out to the Alzheimer's Association ([facility]) for respite options. 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 SW to follow up with hJon in one week's time.           Alexandro Rubinstein, [facility]   Palliative Medicine   Social Work Supervisee for [facility]  [phone]

## 2020-10-21 NOTE — TELEPHONE ENCOUNTER
Returned call to Attila Hunter and advised per Dr. Blaire White ok to give both Sertraline and Lorazepam as prescribed for mood, behaviors and agitation. Per Attila Hunter patient is taking Lorazepam 0.5 mg daily at this time which seems to be helping with her mood and agitation.   She requested holding off on starting Sertraline until patient has a follow virtual visit with MD. Geno Santacruz I will get appt date and time and call back

## 2020-10-21 NOTE — TELEPHONE ENCOUNTER
Ms. Natalia Fajardo is calling to speak to nurse regarding the new medication sertraline. Is she supposed to use this and not the lorazepam .  She is unsure what Dr. Boni Collins wants the patient to have. States change in medication was not discussed with her. Advised nurse would call her back to discuss.

## 2020-10-23 NOTE — TELEPHONE ENCOUNTER
[employer]  Palliative Social Work  Telephone Call      Time in: 2:58pm  Time out: 3:00pm    Note:  SW called back caregiver Akilah Bauer) in follow-up from prior discussion and for caregiver support. CG did not answer, SW left message. Plan:  LILY to continue following as needed.          Wesley Bethea, [facility]   Palliative Medicine   Social Work Supervisee for [address] [place] [address]  [phone]

## 2020-10-26 NOTE — TELEPHONE ENCOUNTER
MsFaizan Braxton Kelly calling to advise MD that patient went to ED yesterday and did not get home until 3am.  States patient had an impacted bowel. Also they did a  CT that the MD's at ED wanted to be sure Dr. Bill Bailey was aware of . This was done at [facility].    Advised would inform nurse and MD.

## 2020-10-26 NOTE — TELEPHONE ENCOUNTER
Palliative Medicine  Nursing Note  [phone])  Fax [phone]      Telephone Call  Patient Name: Jose Maria Lee  YOB: 1923    10/26/2020        Primary Decision Maker: Jazmin Haskins - Other Relative - [phone]   Advance Care Planning 8/4/2020   Patient's [address] is: Named in scanned ACP document   Primary Decision Maker Name -   Primary Decision Maker Phone Number -   Primary Decision Maker Relationship to Patient -   Confirm Advance Directive Yes, on file   Does the patient have other document types Do Not Resuscitate; Power of [employer] returned to Ms. PeacePauletteedmar Reese. Left voice mail message that Dr. Jace Contreras did receive the information from the ED visit. Instructed to please call for any questions or concerns.     Richelle Zuniga RN  Palliative Medicine

## 2020-11-02 NOTE — TELEPHONE ENCOUNTER
Shyla Greco is calling to advise that patient needs a refill on the following medications:  Morphine and Metoprolol tartrate. States they run out on Tuesday. Please send to pharmacy.

## 2020-11-02 NOTE — TELEPHONE ENCOUNTER
Triage for Controlled Substance Refill Request    Pain Diagnosis: Abdominal pain, generalized    Last Outpatient Visit: 9/4/20    Next Outpatient Visit:11/4/20    Reason for refill needed outside of office visit?     -Appointment not scheduled prior to need for scheduled refill    Pharmacy: [id number] - [address] [place] [address]     Medication: MS Contin 15 mg  Dose and directions: 1 tab every 8 hours prn  Number dispensed: 90  Date filled ( or Pharmacy): 10/5/20  # left: 6     reviewed: Yes    Date of Urine Drug Screen: n/a    Opioid Safety Handout given: yes    Appropriate for refill: yes    Action:  Please refill

## 2020-11-04 NOTE — PATIENT INSTRUCTIONS
Dear Freeman Dixon and Yaz Watkins It was a pleasure visiting with you via televisit. We will see you again in 4 weeks via televisit on a Friday at 4:00pm 
 
If labs or imaging tests have been ordered for you today, please call the office  at [phone] 48 hours after completion to obtain the results. Your stated goal: continue being active! Your described symptoms were: Fatigue: 7 Drowsiness: 5 Depression: 2 Pain: 0 Anxiety: 7 Nausea: 0 Anorexia: 1 Dyspnea: 0 Best Well-Being: 3 Constipation: No  
     
 
This is the plan we talked about: 1. Dementia with behavioral changes 
-Continue haldol 0.5-mg at 9:00am, 2:00pm and 6:00pm 
-Start sertraline 25-mg daily for Amara's anxiety 
-Continue lorazepam 1-m/2 tab 4 times daily as needed 
-Continue keeping Rock Ramachandran on a regular routine with her meals and bedtime as this helps to minimize the anxiety and distress often arising from even small changes in her routine or environment 2. Fatigue 
-Continue trazodone 50-m.5 tabs at bedtime for sleep 3. Abdominal pain;bilateral knee pain 
-Continue long-acting morphine 15-mg every 8 hours 
-This has worked well in controlling Amara's pain 4. Constipation  
-Continue daily Miralax 
-Restart lactulose if she goes more than 2 days without a bowel movement 5. Appetite  
-Clives appetite is good without any obvious signs of weight loss 6. Caregiver support 
-Maria Guadalupe Bowers continues to come to your home to help with your care 
-Yousif Palacios looked into the Alzheimer's Association caregiver support group and are very mindful and diligent about your self-care This is what you have shared with us about Advance Care Planning: 
 
  Primary Decision Maker: Valencia Hargrove - Other Relative - [phone] Advance Care Planning 2020 Patient's Healthcare Decision Maker is: Named in scanned ACP document Primary Decision Maker Name -  
Primary Decision Maker Phone Number -  
 Primary Decision Maker Relationship to Patient -  
Confirm Advance Directive Yes, on file Does the patient have other document types Do Not Resuscitate; Power of [employer] The Palliative Medicine Team is here to support you and your family. Sincerely, Mikael Ritter MD and the Palliative Medicine Team

## 2020-11-04 NOTE — PROGRESS NOTES
Palliative Medicine Office Visit Palliative Medicine Nurse Check In 
[phone] Patient Name: Ayad Patel YOB: 1923 Date of Office Visit: 11/4/20 Patient states: \"follow up  \" From Check In Sheet (scanned in Media): 
Has a medical provider talked with you about cardiopulmonary resuscitation (CPR)? [x] Yes   [] No   [] Unable to obtain -  DDNR on file Nurse reminder to complete or update ACP FlowSheet: 
 
Is ACP on the Problem List?    [x] Yes    [x] No 
IF ACP Document is ON FILE; Nurse to place ACP on Problem List  
 
Is there an ACP Note in Chart Review/Note? [] Yes    [x] No  
If NO: ALERT PROVIDER Advance Care Planning 8/4/2020 Patient's Healthcare Decision Maker is: Named in scanned ACP document Primary Decision Maker Name -  
Primary Decision Maker Phone Number -  
Primary Decision Maker Relationship to Patient -  
Confirm Advance Directive Yes, on file Does the patient have other document types Do Not Resuscitate; Power of [employer] Is there anything that we should know about you as a person in order to provide you the best care possible? Have you been to the ER, urgent care clinic since your last visit? [x] Yes   [] No   [] Unable to obtain Have you been hospitalized since your last visit? [] Yes   [x] No   [] Unable to obtain Have you seen or consulted any other health care providers outside of the [address] [place] [address] since your last visit? [] Yes   [] No   [] Unable to obtain Functional status (describe):  
Assistance with all ADL's. Patient incontinent and unable to help care for self. Last BM: 11/1/20  accessed (date): 11/4/20 Bottle review (for opioid pain medication): 
Medication 1: MS Contin 15mg Date filled: 11/2/20 Directions: 15mg 1 every 8 hours # filled: 80 # left: # pills taking per day: 3 Last dose taken:

## 2020-11-04 NOTE — PROGRESS NOTES
Palliative Medicine Outpatient Services [place]: [phone] Patient Name: Corey Klinefelter YOB: 1923 Date of Current Visit: 11/04/20 Location of Current Visit:   
[] [facility] Office 
[] [facility] Office [] [address] [place] [address] 
[] Home 
[x] Other:  televisit Date of Initial Visit: 2/28/18 Requesting Physician: Chico Fisher PA-C Primary Care Physician: Walt Law PA-C 
  
 SUMMARY:  
Corey Klinefelter is a 80y.o. year old with a past history of pancreatic mass by CT suspicious for malignancy, who was referred to Palliative Medicine by Ms. Gely Morton for symptom management and supportive care. She was hospitalized in [place] in 7/2017 with decreased appetite, functional decline and abdominal pain. 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 She declined further work-up (no biopsy). She was given a presumptive diagnosis of pancreatic cancer. She was discharged to live with her closest living relative, 2nd cousin/MPOPacheco VASQUEZ, and was followed by [facility] until 6/2020 when  she was discharged from hospice for failure to decline. She's thrived since hospital discharge, maintaining her functional abilities and with a significant weight gain. The patients social history includes: she is . She has no children. She has no living siblings. She lives in [place] with her second cousin/POA, Pacheco Morrow, and her beloved Baldemar Roche and their [place], [place]. Palliative Medicine is addressing the following current patient/family concerns: abdominal pain; bilateral knee pain; mild fatigue, constipation; dementia with behavioral disturbances; maintaining function and quality of life. PALLIATIVE DIAGNOSES:  
 
  ICD-10-CM ICD-9-CM 1. Dementia with behavioral disturbance, unspecified dementia type ([facility] [address]  F03.91 294.21   
2.  Fatigue, unspecified type  R53.83 780.79   
 3. Anxiety  F41.9 300.00   
4. Abdominal pain, generalized  R10.84 789.07   
5. Bilateral chronic knee pain  M25.561 719.46   
 M25.562 338.29 G89.29    
6. Drug-induced constipation  K59.03 564.09   
  E980.5 PLAN:  
Patient Instructions Dear Shiela Ch and Megan Enrique It was a pleasure visiting with you via televisit. We will see you again in 4 weeks via televisit on a Friday at 4:00pm 
 
If labs or imaging tests have been ordered for you today, please call the office  at [phone] 48 hours after completion to obtain the results. Your stated goal: continue being active! Your described symptoms were: Fatigue: 7 Drowsiness: 5 Depression: 2 Pain: 0 Anxiety: 7 Nausea: 0 Anorexia: 1 Dyspnea: 0 Best Well-Being: 3 Constipation: No  
     
 
This is the plan we talked about: 1. Dementia with behavioral changes 
-Continue haldol 0.5-mg at 9:00am, 2:00pm and 6:00pm 
-Start sertraline 25-mg daily for Amara's anxiety 
-Continue lorazepam 1-m/2 tab 4 times daily as needed 
-Continue keeping Yessenia Brightly on a regular routine with her meals and bedtime as this helps to minimize the anxiety and distress often arising from even small changes in her routine or environment 2. Fatigue 
-Continue trazodone 50-m.5 tabs at bedtime for sleep 3. Abdominal pain;bilateral knee pain 
-Continue long-acting morphine 15-mg every 8 hours 
-This has worked well in controlling Clives pain 4. Constipation  
-Continue daily Miralax 
-Restart lactulose if she goes more than 2 days without a bowel movement 5. Appetite  
-Clives appetite is good without any obvious signs of weight loss 6. Caregiver support 
-Chandrakant Ortega continues to come to your home to help with your care 
-Monique Raphael looked into the Alzheimer's [employer] caregiver support group and are very mindful and diligent about your self-care This is what you have shared with us about Advance Care Planning: Primary Decision Maker: Belle Dawkins - Other Relative - [phone] Advance Care Planning 8/4/2020 Patient's Healthcare Decision Maker is: Named in scanned ACP document Primary Decision Maker Name -  
Primary Decision Maker Phone Number -  
Primary Decision Maker Relationship to Patient -  
Confirm Advance Directive Yes, on file Does the patient have other document types Do Not Resuscitate; Power of [employer] The Palliative Medicine Team is here to support you and your family. Sincerely, Jewels Epps MD and the Palliative Medicine Team 
 
 
  
 
 
Counseling and Coordination: 
 
 
 GOALS OF CARE / TREATMENT PREFERENCES:  
[====Goals of Care====] GOALS OF CARE: 
Patient / health care proxy stated goals: maintenance of quality of life TREATMENT PREFERENCES:  
Code Status:  [] Attempt Resuscitation       [x] Do Not Attempt Resuscitation Primary Decision Maker: Belle Dawkins - Other Relative - [phone] Advance Care Planning: 
Advance Care Planning 8/4/2020 Patient's Healthcare Decision Maker is: Named in scanned ACP document Primary Decision Maker Name -  
Primary Decision Maker Phone Number -  
Primary Decision Maker Relationship to Patient -  
Confirm Advance Directive Yes, on file Does the patient have other document types Do Not Resuscitate; Power of [employer] Other: 
(If patient appropriate for POST, consider using PALLPOST smart phrase here) The palliative care team has discussed with patient / health care proxy about goals of care / treatment preferences for patient. 
[====Goals of Care====] PRESCRIPTIONS GIVEN:  
 
No orders of the defined types were placed in this encounter. FOLLOW UP: No future appointments. PHYSICIANS INVOLVED IN CARE:  
Patient Care Team: 
Eduardo Freedman as PCP - General (Physician Assistant) Eduardo Freedman as PCP - [facility] Empaneled Provider  HISTORY:  
 Nursing documentation from date of visit reviewed. Reviewed patient-completed ESAS and advance care planning form. Reviewed patient record in prescription monitoring program. 
 
CHIEF COMPLAINT:  
 
 
HPI/SUBJECTIVE: The patient is: [x] Verbal / [] Nonverbal (most history obtained from primary caregiver, Tim Holder, due to memory difficulties) Tim Holder took her to the ED last week, diagnosed with \"impacted bowel\" after the aide failed to document her lack of bowel movements for 4 days. She moved her bowels after taking lactulose and is now moving her bowels regularly. Tim Holder has now retired and is able to keep a close eye on her bowel movements. Amara's calmer now but still becomes agitated from time to time. This occurs when the caregiver's grandchildren come to visit. Tim Holder and Yennifer De La Vega usually have Hima Martinez sit in her room when they visit and this seems to help with her anxiety. She's not as weepy, does get upset sometimes, sometimes sees relatives that are . Tim Holder gives her 1/2 lorazepam tab when she gets anxious and isn't consolable. This helps her relax, she doesn't go to sleep, just gets quieter. Aamra's still walking around the house with her walker. She feed herself most days. She's incontinent now most of the time. Her appetite is good. Belle's going to visit her brother this weekend. Zoraida Jain will be staying with Hima Martniez. Belle's visited the Alzheimer's Association website and has connected with a caregiver support group. Clinical Pain Assessment (nonverbal scale for nonverbal patients):  
[++++ Clinical Pain Assessment++++] [++++Pain Severity++++]: Pain: 0 
[++++Pain Character++++]: ache 
[++++Pain Duration++++]: years [++++Pain Effect++++]: less active when pain flares [++++Pain Factors++++]: aspercreme, salonpas helps [++++Pain Frequency++++]: intermittent [++++Pain Location++++]: knees [++++ Clinical Pain Assessment++++]  FUNCTIONAL ASSESSMENT:  
 
 Palliative Performance Scale (PPS): PSYCHOSOCIAL/SPIRITUAL SCREENING:  
 
Any spiritual / Sikh concerns: 
[] Yes /  [x] No 
 
Caregiver Burnout: 
[] Yes /  [x] No /  [] No Caregiver Present Anticipatory grief assessment:  
[x] Normal  / [] Maladaptive ESAS Anxiety: Anxiety: 7 ESAS Depression: Depression: 2 REVIEW OF SYSTEMS:  
 
The following systems were [x] reviewed / [] unable to be reviewed Systems: constitutional, ears/nose/mouth/throat, respiratory, gastrointestinal, genitourinary, musculoskeletal, integumentary, neurologic, psychiatric, endocrine. Positive findings noted below. Modified ESAS Completed by: provider Fatigue: 7 Drowsiness: 5 Depression: 2 Pain: 0 Anxiety: 7 Nausea: 0 Anorexia: 1 Dyspnea: 0 Best Well-Being: 3 Constipation: No  
     
 
 
 PHYSICAL EXAM:  
 
Wt Readings from Last 3 Encounters:  
05/13/20 161 lb 12.8 oz (73.4 kg) 04/01/20 160 lb 6.4 oz (72.8 kg) 02/25/20 167 lb 9.6 oz (76 kg) There were no vitals taken for this visit. Last bowel movement: See Nursing Note Constitutional   
[x] Appears well-developed and well-nourished in no apparent distress   
[] Abnormal: 
Mental status [x] Alert and awake 
[] Oriented to person/place/time 
[x] Able to follow commands 
[] Abnormal:  
Eyes 
[x] EOM normal  
[x] Sclera normal  
[x] No visible ocular discharge 
[x] Abnormal:  HARD OF HEARING 
HENT [x] Normocephalic, atraumatic [x] Mouth/Throat: Moist mucous membranes  
[x] External Ears normal 
[x] Abnormal: hard of hearing Neck [x] No visualized mass 
[] Abnormal: 
Pulmonary/Chest  
[x] Respiratory effort normal 
[x] No visualized signs of difficulty breathing or respiratory distress 
[] Abnormal: Musculoskeletal 
[x] Normal gait with no signs of ataxia [x] Normal range of motion of neck [x] Abnormal: moderate, symmetric bilateral lower leg and foot edema Neurological:  
[x] No facial asymmetry (Cranial nerve 7 motor function) [x] No gaze palsy 
[] Abnormal:  
Skin 
[x] No significant exanthematous lesions or discoloration noted on facial skin 
[] Abnormal: Psychiatric [x] Normal affect [x] No hallucinations [] Abnormal: 
 
Other pertinent observable physical exam findings: 
 
Due to this being a TeleHealth evaluation, many elements of the physical examination are unable to be assessed. HISTORY:  
 
Past Medical History:  
Diagnosis Date  Cancer ([facility] Pancreatic  Dry eyes  Hypertension, essential   
 Migraine  NSTEMI (non-ST elevated myocardial infarction) ([facility] 05/2020 Social History Tobacco Use  Smoking status: Never Smoker  Smokeless tobacco: Never Used Substance Use Topics  Alcohol use: No  
 
No Known Allergies Current Outpatient Medications Medication Sig  polyethylene glycol (Miralax) 17 gram/dose powder Take 17 g by mouth as needed for Constipation.  morphine CR (MS CONTIN) 15 mg CR tablet Take 1 Tab by mouth every eight (8) hours for 30 days. Max Daily Amount: 45 mg.  
 metoprolol tartrate (LOPRESSOR) 25 mg tablet TAKE 1 TABLET BY MOUTH TWICE DAILY  traZODone (DESYREL) 50 mg tablet TAKE 1 1/2 TABLET BY MOUTH EVERY NIGHT  haloperidoL (HALDOL) 0.5 mg tablet TAKE 1 TABLET BY MOUTH THREE TIMES DAILY  LORazepam (ATIVAN) 1 mg tablet Take 1 Tab by mouth two (2) times daily as needed for Anxiety. Max Daily Amount: 2 mg.  sertraline (ZOLOFT) 25 mg tablet Take 1 Tab by mouth daily.  lactulose (CHRONULAC) 10 gram/15 mL solution TAKE 15 ML BY MOUTH EVERY DAY AS NEEDED FOR CONSTIPATION  aspirin delayed-release 81 mg tablet Take  by mouth daily as needed for Pain.  senna-docusate (SENNA PLUS) 8.6-50 mg per tablet Take 2 Tabs by mouth two (2) times a day.  DULCOLAX, BISACODYL, PO Take 0.5 Caps by mouth daily as needed. No current facility-administered medications for this visit.    
 
 
 
 LAB DATA REVIEWED:  
 
 No results found for: WBC, HGB, PLT, HGBEXT, PLTEXT, HGBEXT, PLTEXT Lab Results Component Value Date/Time Sodium 136 09/30/2020 11:28 AM  
 Potassium 4.6 09/30/2020 11:28 AM  
 Chloride 97 09/30/2020 11:28 AM  
 CO2 27 09/30/2020 11:28 AM  
 BUN 21 09/30/2020 11:28 AM  
 Creatinine 1.15 (H) 09/30/2020 11:28 AM  
 Calcium 9.2 09/30/2020 11:28 AM  
  
Lab Results Component Value Date/Time Alk. phosphatase 47 09/30/2020 11:28 AM  
 Protein, total 6.7 09/30/2020 11:28 AM  
 Albumin 3.8 09/30/2020 11:28 AM  
 
No results found for: INR, PTMR, PTP, PT1, PT2, APTT, INREXT, INREXT No results found for: IRON, FE, TIBC, IBCT, PSAT, FERR CONTROLLED SUBSTANCES SAFETY ASSESSMENT (IF ON CONTROLLED SUBSTANCES):  
 
Reviewed opioid safety handout:  [x] Yes   [] No 
24 hour opioid dose >150mg morphine equivalent/day:  [] Yes   [x] No 
Benzodiazepines:  [] Yes   [x] No 
Sleep apnea:  [] Yes   [x] No 
Urine Toxicology Testing within last 6 months:  [] Yes   [x] No 
History of or new aberrant medication taking behaviors:  [] Yes   [x] No 
Narcan prescribed:  [] Yes   [x] No 
 
   
 
Total time:  
Counseling / coordination time:  
> 50% counseling / coordination?:  
 
Consent: 
He and/or health care decision maker is aware that that he may receive a bill for this telehealth service, depending on his insurance coverage, and has provided verbal consent to proceed: Yes CPT Codes 09620-30495 for Established Patients may apply to this Telehealth VisitTime-based coding, delete if not needed: I spent at least 40 minutes with this established patient, and >50% of the time was spent counseling and/or coordinating care regarding see Plan/Patient Instructions Pursuant to the emergency declaration under the [facility], [address] [place] [address] authority and the [employer] Act, this Virtual Visit was conducted, with patient's consent, to reduce the patient's risk of exposure to COVID-19 and provide continuity of care for an established patient. Services were provided through a video synchronous discussion virtually to substitute for in-person clinic visit.

## 2020-11-25 NOTE — TELEPHONE ENCOUNTER
The patient's caregiver is requesting refills for the following medications: Haloperidol (7 days left) and Morphine (9 days left) to be called in to [facility] on [address]

## 2020-11-25 NOTE — TELEPHONE ENCOUNTER
Triage for Controlled Substance Refill Request    Pain Diagnosis: Pancreatic mass    Last Outpatient Visit: 11/4/2020    Next Outpatient Visit: 12/9/2020    Reason for refill needed outside of office visit?   -Appointment not scheduled prior to need for scheduled refill    Pharmacy: [id number] - [address] [place] [id number]    Medication: Morphine CR 15 mg CR tablet  Dose and directions: 1 tablet Q8hrs  Number dispensed:90  Date filled ( or Pharmacy): 30  # left: 7 days left        reviewed: 11/25/2020    Date of Urine Drug Screen:  n/a    Opioid Safety Handout given:  yes    Appropriate for refill:  yes    Action:  medication pending

## 2020-11-25 NOTE — TELEPHONE ENCOUNTER
Spoke with patients care giver, advised that Haldol was sent to the pharmacy yesterday.  Morphine pending Dr. Willow Gates approval.

## 2020-12-09 NOTE — PROGRESS NOTES
Palliative Medicine Outpatient Services [place]: [phone] Patient Name: Nuno Mo YOB: 1923 Date of Current Visit: 12/09/20 Location of Current Visit:   
[] [facility] Office 
[] [facility] [] [address] [place] [address] 
[] Home 
[x] Other:  televisit Date of Initial Visit: 2/28/18 Requesting Physician: Haily Hernandez PA-C Primary Care Physician: Rosanne Reaves PA-C 
  
 SUMMARY:  
Nuno Mo is a 80y.o. year old with a past history of pancreatic mass by CT suspicious for malignancy, who was referred to Palliative Medicine by Ms. Erum Powell for symptom management and supportive care. She was hospitalized in [address] [place] [address] in 7/2017 with decreased appetite, functional decline and abdominal pain. 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 She declined further work-up (no biopsy). She was given a presumptive diagnosis of pancreatic cancer. She was discharged to live with her closest living relative, 2nd cousin/MPOA, Madhuri Funes, and was followed by [facility] until 6/2020 when  she was discharged from hospice for failure to decline. She's thrived since hospital discharge, maintaining her functional abilities and with a significant weight gain. The patients social history includes: she is . She has no children. She has no living siblings. She lives in [place] with her second cousin/POA, Madhuri Funes, and her beloved Wes Harvey and their [place], [place]. Palliative Medicine is addressing the following current patient/family concerns: abdominal pain; bilateral knee pain; mild fatigue, constipation; dementia with behavioral disturbances; maintaining function and quality of life. PALLIATIVE DIAGNOSES:  
 
  ICD-10-CM ICD-9-CM 1. Dementia with behavioral disturbance, unspecified dementia type ([facility] [address]  F03.91 294.21   
2.  Abdominal pain, generalized  R10.84 789.07   
 3. Bilateral chronic knee pain  M25.561 719.46   
 M25.562 338.29 G89.29    
4. Drug-induced constipation  K59.03 564.09   
  E980.5 5. Poor appetite  R63.0 783.0 6. Pancreatic mass  K86.89 577.8 7. Malignant neoplasm of pancreas, unspecified location of malignancy (HCC)  C25.9 157.9 morphine CR (MS CONTIN) 15 mg CR tablet PLAN:  
Patient Instructions Dear Edson Pineda It was a pleasure visiting with you via televisit. We will see you again in 4 weeks via televisit. If labs or imaging tests have been ordered for you today, please call the office  at [phone] 48 hours after completion to obtain the results. Your stated goal: continue care with focus on quality  of life Your described symptoms were: Fatigue: 9 Drowsiness: 8 Depression: 4 Pain: 0 Anxiety: 4 Nausea: 0 Anorexia: 10 Dyspnea: 0 Best Well-Bein Constipation: No  
Other Problem (Comment): 0 This is the plan we talked about: Today we talked about the recent changes in Amara's appetite, her interaction with others, her increased sleeping and increased reliance on verbal prompting to perform certain actions such as chewing or brushing her teeth. There have been some changes in her environment with your recent short trips. She usually manifests some of the above changes when you go away, then returns back to her baseline within a few days of your return. You and Kristie Alvarez are currently on an out-of-town trip. Kristie Alvarez began demonstrating the above changes prior to this trip. Today we discussed whether these changes are part of the natural progression of her dementia or if they may be related to her recent trip to have her ears cleaned and your current trip. The changes may be a combination of both those seen over the natural history of dementia compounded by trips outside of the home.  
 
Your overall goal in providing care for Kristie Alvarez is in maintaining quality of life. Elisabet Mcmullen are now retired and feel comfortable providing for her care needs with Marcell's help. We discussed the guidelines for hospice admission for end-stage dementia and the option of having a hospice referral to assess whether Miguel Hadley meets those guidelines at the present time. You decided to defer this until she is no longer ambulatory. 1. Dementia with behavioral changes 
-Continue haldol 0.5-mg at 9:00am, 2:00pm and 6:00pm 
-Start sertraline 25-mg daily for Amara's anxiety 
-Continue lorazepam 1-m/2 tab 4 times daily as needed 
-Continue keeping Miguel Hadley on a regular routine with her meals and bedtime as this helps to minimize the anxiety and distress often arising from even small changes in her routine or environment 2. Fatigue 
-Continue trazodone 50-m.5 tabs at bedtime for sleep 3. Abdominal pain;bilateral knee pain 
-Continue long-acting morphine 15-mg every 8 hours 
-This has worked well in controlling Amara's pain 4. Constipation  
-Continue daily Miralax 
-Restart lactulose if she goes more than 2 days without a bowel movement 5. Appetite  
-Continue to allow Miguel Hadley to eat the foods she enjoys 
-Continue daily Ensure or other nutritional shakes if she enjoys it 6. Caregiver support 
-Heriberto Hernandez continues to come to your home to help with your care 
-Winnie Gonzalez looked into the Alzheimer's [employer] caregiver support group and are very mindful and diligent about your self-care This is what you have shared with us about Advance Care Planning: 
 
  Primary Decision Maker: Blanca Ludwig - Other Relative - [phone] Advance Care Planning 2020 Patient's Healthcare Decision Maker is: Named in scanned ACP document Primary Decision Maker Name -  
Primary Decision Maker Phone Number -  
Primary Decision Maker Relationship to Patient -  
Confirm Advance Directive Yes, on file Does the patient have other document types -  
 
 
 
 
 The Palliative Medicine Team is here to support you and your family. Sincerely, Dana Schulz MD and the Palliative Medicine Team 
 
 
  
 
 
Counseling and Coordination: 
 
 
 GOALS OF CARE / TREATMENT PREFERENCES:  
[====Goals of Care====] GOALS OF CARE: 
Patient / health care proxy stated goals: maintenance of quality of life TREATMENT PREFERENCES:  
Code Status:  [] Attempt Resuscitation       [x] Do Not Attempt Resuscitation Primary Decision Maker: Belle Dawkins - Other Relative - [phone] Advance Care Planning: 
Advance Care Planning 12/9/2020 Patient's Healthcare Decision Maker is: Named in scanned ACP document Primary Decision Maker Name -  
Primary Decision Maker Phone Number -  
Primary Decision Maker Relationship to Patient -  
Confirm Advance Directive Yes, on file Does the patient have other document types - Other: 
(If patient appropriate for POST, consider using PALLPOST smart phrase here) The palliative care team has discussed with patient / health care proxy about goals of care / treatment preferences for patient. 
[====Goals of Care====] PRESCRIPTIONS GIVEN:  
 
No orders of the defined types were placed in this encounter. FOLLOW UP: No future appointments. PHYSICIANS INVOLVED IN CARE:  
Patient Care Team: 
Jimena leavitt PCP - General (Physician Assistant) Dana Schulz MD (Palliative Medicine) HISTORY:  
Nursing documentation from date of visit reviewed. Reviewed patient-completed ESAS and advance care planning form. Reviewed patient record in prescription monitoring program. 
 
CHIEF COMPLAINT:  
Chief Complaint Patient presents with  Decreased Appetite HPI/SUBJECTIVE: The patient is: [x] Verbal / [] Nonverbal (most history obtained from primary caregiver, Prudy Estimable, due to memory difficulties) Amara's had a significant change over the past week. She's sleeping more. Ada Jeffrey gets her out of bed and into her recliner every day where she goes back to sleep. She isn't eating. She will eat a few bites of food when it's offered. She has to be reminded to chew her food. She hasn't eaten any food that requires a lot of chewing (meat, chicken) in weeks. Belle's been feeding her mashed potatoes with gravy, pumpkin pie, toast and having her drink Ensure. She isn't drinking much. Ada Jeffrey thinks she might be getting in ~300 calories a day. She hasn't lost any weight. She weighed 161# when she went to the doctor's office a few weeks ago to get her ears cleaned. She's having regular bowel movements. She's still urinating. Her urine might look a little darker but it's clear. She can still walk with her walker with Ada Jeffrey or Lajune Persons at her side. She can brush her own teeth but needs prompting. She cannot dress or bathe herself. Ada Jeffrey can get her to walk to the bathroom and sit on the toilet but she cannot clean herself. She cannot stand up without help. She hasn't had any falls. Her legs are elevated when she's in her recliner. She says very little. She makes an occasional comment. She recently made a comment to Ada Jeffrey \"Stop threatening me\" when she was walking to the bathroom. She mostly has a blank look on her face. She hasn't appeared to be in pain. She hasn't been agitated or anxious. Clinical Pain Assessment (nonverbal scale for nonverbal patients):  
[++++ Clinical Pain Assessment++++] [++++Pain Severity++++]: Pain: 0 
[++++Pain Character++++]: ache 
[++++Pain Duration++++]: years [++++Pain Effect++++]: less active when pain flares [++++Pain Factors++++]: aspercreme, salonpas helps [++++Pain Frequency++++]: intermittent [++++Pain Location++++]: knees [++++ Clinical Pain Assessment++++] FUNCTIONAL ASSESSMENT:  
 
Palliative Performance Scale (PPS): PPS: 60 PSYCHOSOCIAL/SPIRITUAL SCREENING:  
 
Any spiritual / Jew concerns: [] Yes /  [x] No 
 
Caregiver Burnout: 
[] Yes /  [x] No /  [] No Caregiver Present Anticipatory grief assessment:  
[x] Normal  / [] Maladaptive ESAS Anxiety: Anxiety: 4 ESAS Depression: Depression: 4 REVIEW OF SYSTEMS:  
 
The following systems were [x] reviewed / [] unable to be reviewed Systems: constitutional, ears/nose/mouth/throat, respiratory, gastrointestinal, genitourinary, musculoskeletal, integumentary, neurologic, psychiatric, endocrine. Positive findings noted below. Modified ESAS Completed by: provider Fatigue: 9 Drowsiness: 8 Depression: 4 Pain: 0 Anxiety: 4 Nausea: 0 Anorexia: 10 Dyspnea: 0 Best Well-Bein Constipation: No  
Other Problem (Comment): 0 PHYSICAL EXAM:  
 
Wt Readings from Last 3 Encounters:  
20 161 lb 12.8 oz (73.4 kg) 20 160 lb 6.4 oz (72.8 kg) 20 167 lb 9.6 oz (76 kg) There were no vitals taken for this visit. Last bowel movement: See Nursing Note Constitutional   
[x] Appears well-developed and well-nourished in no apparent distress   
[] Abnormal:  
Mental status 
[] Alert and awake 
[] Oriented to person/place/time 
[] Able to follow commands 
[x] Abnormal: sitting in recliner, asleep Eyes 
[] EOM normal  
[] Sclera normal  
[] No visible ocular discharge 
[x] Abnormal:   
HENT [x] Normocephalic, atraumatic 
[] Mouth/Throat: Moist mucous membranes  
[x] External Ears normal 
[x] Abnormal: hard of hearing Neck [x] No visualized mass 
[] Abnormal: 
Pulmonary/Chest  
[x] Respiratory effort normal 
[x] No visualized signs of difficulty breathing or respiratory distress 
[] Abnormal: Musculoskeletal 
[] Normal gait with no signs of ataxia [] Normal range of motion of neck [x] Abnormal: moderate, symmetric bilateral lower leg and foot edema Neurological:  
[x] No facial asymmetry (Cranial nerve 7 motor function) [] No gaze palsy 
[] Abnormal:  
Skin [x] No significant exanthematous lesions or discoloration noted on facial skin 
[] Abnormal: Psychiatric 
[] Normal affect 
[] No hallucinations [] Abnormal: 
 
Other pertinent observable physical exam findings: 
 
Due to this being a TeleHealth evaluation, many elements of the physical examination are unable to be assessed. HISTORY:  
 
Past Medical History:  
Diagnosis Date  Cancer ([facility] Pancreatic  Dry eyes  Hypertension, essential   
 Migraine  NSTEMI (non-ST elevated myocardial infarction) ([facility] 05/2020 Social History Tobacco Use  Smoking status: Never Smoker  Smokeless tobacco: Never Used Substance Use Topics  Alcohol use: No  
 
No Known Allergies Current Outpatient Medications Medication Sig  morphine CR (MS CONTIN) 15 mg CR tablet Take 1 Tab by mouth every eight (8) hours for 30 days. Max Daily Amount: 45 mg.  
 traZODone (DESYREL) 50 mg tablet TAKE 1 1/2 TABLET BY MOUTH EVERY NIGHT  haloperidoL (HALDOL) 0.5 mg tablet TAKE 1 TABLET BY MOUTH THREE TIMES DAILY  sertraline (ZOLOFT) 25 mg tablet TAKE 1 TABLET BY MOUTH DAILY  polyethylene glycol (Miralax) 17 gram/dose powder Take 17 g by mouth as needed for Constipation.  metoprolol tartrate (LOPRESSOR) 25 mg tablet TAKE 1 TABLET BY MOUTH TWICE DAILY  LORazepam (ATIVAN) 1 mg tablet Take 1 Tab by mouth two (2) times daily as needed for Anxiety. Max Daily Amount: 2 mg.  DULCOLAX, BISACODYL, PO Take 0.5 Caps by mouth daily as needed. No current facility-administered medications for this visit. LAB DATA REVIEWED:  
 
No results found for: WBC, HGB, PLT, HGBEXT, PLTEXT, HGBEXT, PLTEXT Lab Results Component Value Date/Time  Sodium 136 09/30/2020 11:28 AM  
 Potassium 4.6 09/30/2020 11:28 AM  
 Chloride 97 09/30/2020 11:28 AM  
 CO2 27 09/30/2020 11:28 AM  
 BUN 21 09/30/2020 11:28 AM  
 Creatinine 1.15 (H) 09/30/2020 11:28 AM  
 Calcium 9.2 09/30/2020 11:28 AM  
  
Lab Results Component Value Date/Time Alk. phosphatase 47 09/30/2020 11:28 AM  
 Protein, total 6.7 09/30/2020 11:28 AM  
 Albumin 3.8 09/30/2020 11:28 AM  
 
No results found for: INR, PTMR, PTP, PT1, PT2, APTT, INREXT, INREXT No results found for: IRON, FE, TIBC, IBCT, PSAT, FERR CONTROLLED SUBSTANCES SAFETY ASSESSMENT (IF ON CONTROLLED SUBSTANCES):  
 
Reviewed opioid safety handout:  [x] Yes   [] No 
24 hour opioid dose >150mg morphine equivalent/day:  [] Yes   [x] No 
Benzodiazepines:  [] Yes   [x] No 
Sleep apnea:  [] Yes   [x] No 
Urine Toxicology Testing within last 6 months:  [] Yes   [x] No 
History of or new aberrant medication taking behaviors:  [] Yes   [x] No 
Narcan prescribed:  [] Yes   [x] No 
 
   
 
Total time: 45 minutes Counseling / coordination time: 30 minutes 
> 50% counseling / coordination?: yes - see Plan/Patient Instructions Consent: 
He and/or health care decision maker is aware that that he may receive a bill for this telehealth service, depending on his insurance coverage, and has provided verbal consent to proceed: Yes CPT Codes 44337-77859 for Established Patients may apply to this Telehealth VisitTime-based coding, delete if not needed: I spent at least 40 minutes with this established patient, and >50% of the time was spent counseling and/or coordinating care regarding see Plan/Patient Instructions Pursuant to the emergency declaration under the [facility] waiver authority and the [employer] and [employer] Act, this Virtual  Visit was conducted, with patient's consent, to reduce the patient's risk of exposure to COVID-19 and provide continuity of care for an established patient. Services were provided through a video synchronous discussion virtually to substitute for in-person clinic visit.

## 2020-12-09 NOTE — PATIENT INSTRUCTIONS
Dear Misha Fuller It was a pleasure visiting with you via televisit. We will see you again in 4 weeks via televisit. If labs or imaging tests have been ordered for you today, please call the office  at [phone] 48 hours after completion to obtain the results. Your stated goal: continue care with focus on quality  of life Your described symptoms were: Fatigue: 9 Drowsiness: 8 Depression: 4 Pain: 0 Anxiety: 4 Nausea: 0 Anorexia: 10 Dyspnea: 0 Best Well-Bein Constipation: No  
Other Problem (Comment): 0 This is the plan we talked about: Today we talked about the recent changes in Amara's appetite, her interaction with others, her increased sleeping and increased reliance on verbal prompting to perform certain actions such as chewing or brushing her teeth. There have been some changes in her environment with your recent short trips. She usually manifests some of the above changes when you go away, then returns back to her baseline within a few days of your return. You and Alcides Lee are currently on an out-of-town trip. Alcides Lee began demonstrating the above changes prior to this trip. Today we discussed whether these changes are part of the natural progression of her dementia or if they may be related to her recent trip to have her ears cleaned and your current trip. The changes may be a combination of both those seen over the natural history of dementia compounded by trips outside of the home. Your overall goal in providing care for Alcides Lee is in maintaining quality of life. You are now retired and feel comfortable providing for her care needs with Marcell's help. We discussed the guidelines for hospice admission for end-stage dementia and the option of having a hospice referral to assess whether Alcides Lee meets those guidelines at the present time. You decided to defer this until she is no longer ambulatory. 1. Dementia with behavioral changes -Continue haldol 0.5-mg at 9:00am, 2:00pm and 6:00pm 
-Start sertraline 25-mg daily for Amara's anxiety 
-Continue lorazepam 1-m/2 tab 4 times daily as needed 
-Continue keeping Norm Klinefelter on a regular routine with her meals and bedtime as this helps to minimize the anxiety and distress often arising from even small changes in her routine or environment 2. Fatigue 
-Continue trazodone 50-m.5 tabs at bedtime for sleep 3. Abdominal pain;bilateral knee pain 
-Continue long-acting morphine 15-mg every 8 hours 
-This has worked well in controlling Clives pain 4. Constipation  
-Continue daily Miralax 
-Restart lactulose if she goes more than 2 days without a bowel movement 5. Appetite  
-Continue to allow Norm Klinefelter to eat the foods she enjoys 
-Continue daily Ensure or other nutritional shakes if she enjoys it 6. Caregiver support 
-Leonides Wang continues to come to your home to help with your care 
-Dipesh Buck looked into the Alzheimer's [employer] caregiver support group and are very mindful and diligent about your self-care This is what you have shared with us about Advance Care Planning: 
 
  Primary Decision Maker: Keyshawn Warren - Other Relative - [phone] Advance Care Planning 2020 Patient's Healthcare Decision Maker is: Named in scanned ACP document Primary Decision Maker Name -  
Primary Decision Maker Phone Number -  
Primary Decision Maker Relationship to Patient -  
Confirm Advance Directive Yes, on file Does the patient have other document types - The Palliative Medicine Team is here to support you and your family. Sincerely, Yary Mancuso MD and the Palliative Medicine Team

## 2020-12-09 NOTE — PROGRESS NOTES
Palliative Medicine Office Visit  Palliative Medicine Nurse Check In  [phone]    Patient Name: Freeman Dixon  YOB: 1923      Date of Office Visit:  12/9/2020    Patient states: Dementia progressing, not eating or drinking. Forgetting to chew    From Check In Sheet (scanned in Media):  Has a medical provider talked with you about cardiopulmonary resuscitation (CPR)? [x] Yes   [] No   [] Unable to obtain    Nurse reminder to complete or update ACP FlowSheet:    Is ACP on the Problem List?    [x] Yes    [] No  IF ACP Document is ON FILE; Nurse to place ACP on Problem List     Is there an ACP Note in Chart Review/Note? [x] Yes    [] No   If NO: ALERT PROVIDER       Primary Decision Maker: Valenciarichi Hargrove - Other Relative - [phone]  Advance Care Planning 12/9/2020   Patient's Healthcare Decision Maker is: Named in scanned ACP document   Primary Decision Maker Name -   Primary Decision Maker Phone Number -   Primary Decision Maker Relationship to Patient -   Confirm Advance Directive Yes, on file   Does the patient have other document types -       Is there anything that we should know about you as a person in order to provide you the best care possible? Have you been to the ER, urgent care clinic since your last visit? [] Yes   [x] No   [] Unable to obtain    Have you been hospitalized since your last visit? [] Yes   [x] No   [] Unable to obtain    Have you seen or consulted any other health care providers outside of the [address] [place] [address] since your last visit?    [x] Yes   [] No   [] Unable to obtain Ear wax impaction removed     Functional status (describe):     Last BM: 12/8/2020     accessed (date): 12/9/2020    Bottle review (for opioid pain medication):  Medication 1:   Date filled:   Directions:   # filled:   # left:   # pills taking per day:  Last dose taken:    Medication 2:   Date filled:   Directions:   # filled:   # left:   # pills taking per day:  Last dose taken:    Medication 3:   Date filled:   Directions:   # filled:   # left:   # pills taking per day:  Last dose taken:    Medication 4:   Date filled:   Directions:   # filled:   # left:   # pills taking per day:  Last dose taken:

## 2020-12-28 NOTE — TELEPHONE ENCOUNTER
Palliative Medicine  Nursing Note  [phone]  Fax [phone]      Telephone Call  Patient Name: Domenico Oneill  YOB: 1923 12/28/2020        Primary Decision Maker: Chinyere Higgins - Other Relative - [phone]   Advance Care Planning 12/9/2020   Patient's Healthcare Decision Maker is: Named in scanned ACP document   Primary Decision Maker Name -   Primary Decision Maker Phone Number -   Primary Decision Maker Relationship to Patient -   Confirm Advance Directive Yes, on file   Does the patient have other document types -     Call returned to Ms. Alanis Mcbride, caregiver for Ms. Chin. She states that Adolfo Perla has not been able to eat for past 3 days due to swallowing difficulties and choking. She shares that she has had maybe a few sips each day and nothing solid. She feels her lungs are getting congested and have fluid in them as she can hear a \"gurgling sound\" when she breaths. She has physically declined and remains in the bed. Her speech has changed and it has been difficult to understand her. She knows that people don't last long when they can't drink and thinks it's time for Hospice. Ms. Tram Moon has had [facility] in the past and is open to reciving care from them again. She requested that Dr. Arabella Jones stay involved in the treatment and medication management as she has in the past.    Discussed that a referral will be made and they should call within a few days to set up a date and time. She was appreciative. Call placed to [facility] and provided referral.  Faxed required documents,  demographics and  H&P, to [phone] with verification of receipt.     Gomez Garcia, RN  Palliative Medicine

## 2021-01-01 DIAGNOSIS — F01.518 VASCULAR DEMENTIA WITH BEHAVIOR DISTURBANCE: ICD-10-CM

## 2021-01-01 DIAGNOSIS — Z72.820 POOR SLEEP: ICD-10-CM

## 2021-01-01 RX ORDER — HALOPERIDOL 0.5 MG/1
TABLET ORAL
Qty: 90 TAB | Refills: 0 | Status: SHIPPED | OUTPATIENT
Start: 2021-01-01 | End: 2021-01-01 | Stop reason: SDUPTHER

## 2021-01-01 RX ORDER — TRAZODONE HYDROCHLORIDE 50 MG/1
TABLET ORAL
Qty: 45 TAB | Refills: 0 | Status: SHIPPED | OUTPATIENT
Start: 2021-01-01 | End: 2021-01-01 | Stop reason: SDUPTHER

## 2021-01-01 RX ORDER — TRAZODONE HYDROCHLORIDE 50 MG/1
TABLET ORAL
Qty: 45 TAB | Refills: 0 | Status: SHIPPED | OUTPATIENT
Start: 2021-01-01

## 2021-01-01 RX ORDER — SERTRALINE HYDROCHLORIDE 25 MG/1
TABLET, FILM COATED ORAL
Qty: 30 TAB | Refills: 0 | Status: SHIPPED | OUTPATIENT
Start: 2021-01-01

## 2021-01-01 RX ORDER — SERTRALINE HYDROCHLORIDE 25 MG/1
TABLET, FILM COATED ORAL
Qty: 30 TAB | Refills: 0 | Status: SHIPPED | OUTPATIENT
Start: 2021-01-01 | End: 2021-01-01

## 2021-01-01 RX ORDER — METOPROLOL TARTRATE 25 MG/1
TABLET, FILM COATED ORAL
Qty: 180 TAB | Refills: 0 | Status: SHIPPED | OUTPATIENT
Start: 2021-01-01

## 2021-01-01 RX ORDER — HALOPERIDOL 0.5 MG/1
TABLET ORAL
Qty: 90 TAB | Refills: 0 | Status: SHIPPED | OUTPATIENT
Start: 2021-01-01

## 2021-01-19 NOTE — TELEPHONE ENCOUNTER
Received a pharmacy request for Trazodone 50 mg. Last filled 12/22/2020, and last seen in office 12/9/2020.  Medication sent to the pharmacy

## 2021-01-19 NOTE — TELEPHONE ENCOUNTER
Received medication request from [facility] for Haloperidol 0.5 mg tablets. Last refilled 12/22/2020, and last seen in office 12/9/2020.  Medication sent to the pharmacy

## 2021-03-10 DIAGNOSIS — F01.518 VASCULAR DEMENTIA WITH BEHAVIOR DISTURBANCE: ICD-10-CM

## 2021-03-10 RX ORDER — SERTRALINE HYDROCHLORIDE 25 MG/1
TABLET, FILM COATED ORAL
Qty: 30 TAB | Refills: 0 | OUTPATIENT
Start: 2021-03-10